# Patient Record
Sex: FEMALE | Race: WHITE
[De-identification: names, ages, dates, MRNs, and addresses within clinical notes are randomized per-mention and may not be internally consistent; named-entity substitution may affect disease eponyms.]

---

## 2017-07-22 ENCOUNTER — HOSPITAL ENCOUNTER (EMERGENCY)
Dept: HOSPITAL 25 - ED | Age: 77
LOS: 1 days | Discharge: HOME | End: 2017-07-23
Payer: MEDICARE

## 2017-07-22 VITALS — DIASTOLIC BLOOD PRESSURE: 86 MMHG | SYSTOLIC BLOOD PRESSURE: 142 MMHG

## 2017-07-22 DIAGNOSIS — R18.8: ICD-10-CM

## 2017-07-22 DIAGNOSIS — J90: Primary | ICD-10-CM

## 2017-07-22 DIAGNOSIS — R10.9: ICD-10-CM

## 2017-07-22 DIAGNOSIS — R50.9: ICD-10-CM

## 2017-07-22 LAB
ALBUMIN SERPL BCG-MCNC: 3.7 G/DL (ref 3.2–5.2)
ALP SERPL-CCNC: 61 U/L (ref 34–104)
ALT SERPL W P-5'-P-CCNC: 9 U/L (ref 7–52)
ANION GAP SERPL CALC-SCNC: 11 MMOL/L (ref 2–11)
AST SERPL-CCNC: 22 U/L (ref 13–39)
BUN SERPL-MCNC: 25 MG/DL (ref 6–24)
BUN/CREAT SERPL: 26.3 (ref 8–20)
CALCIUM SERPL-MCNC: 9.5 MG/DL (ref 8.6–10.3)
CHLORIDE SERPL-SCNC: 101 MMOL/L (ref 101–111)
GLOBULIN SER CALC-MCNC: 2.7 G/DL (ref 2–4)
GLUCOSE SERPL-MCNC: 106 MG/DL (ref 70–100)
HCO3 SERPL-SCNC: 21 MMOL/L (ref 22–32)
HCT VFR BLD AUTO: 38 % (ref 35–47)
HGB BLD-MCNC: 12.3 G/DL (ref 12–16)
MAGNESIUM SERPL-MCNC: 1.7 MG/DL (ref 1.9–2.7)
MCH RBC QN AUTO: 27 PG (ref 27–31)
MCHC RBC AUTO-ENTMCNC: 33 G/DL (ref 31–36)
MCV RBC AUTO: 83 FL (ref 80–97)
POTASSIUM SERPL-SCNC: 4 MMOL/L (ref 3.5–5)
PROT SERPL-MCNC: 6.4 G/DL (ref 6.4–8.9)
RBC # BLD AUTO: 4.51 10^6/UL (ref 4–5.4)
SODIUM SERPL-SCNC: 133 MMOL/L (ref 133–145)
WBC # BLD AUTO: 9.7 10^3/UL (ref 3.5–10.8)

## 2017-07-22 PROCEDURE — 71275 CT ANGIOGRAPHY CHEST: CPT

## 2017-07-22 PROCEDURE — 80053 COMPREHEN METABOLIC PANEL: CPT

## 2017-07-22 PROCEDURE — 93005 ELECTROCARDIOGRAM TRACING: CPT

## 2017-07-22 PROCEDURE — 36415 COLL VENOUS BLD VENIPUNCTURE: CPT

## 2017-07-22 PROCEDURE — 85025 COMPLETE CBC W/AUTO DIFF WBC: CPT

## 2017-07-22 PROCEDURE — 83735 ASSAY OF MAGNESIUM: CPT

## 2017-07-22 PROCEDURE — 83880 ASSAY OF NATRIURETIC PEPTIDE: CPT

## 2017-07-22 PROCEDURE — 71020: CPT

## 2017-07-22 PROCEDURE — 85379 FIBRIN DEGRADATION QUANT: CPT

## 2017-07-22 PROCEDURE — 85610 PROTHROMBIN TIME: CPT

## 2017-07-22 PROCEDURE — 83605 ASSAY OF LACTIC ACID: CPT

## 2017-07-22 PROCEDURE — 99283 EMERGENCY DEPT VISIT LOW MDM: CPT

## 2017-07-22 NOTE — ED
Ortega VASQUEZ Benjamin, scribed for Chandu Hare MD on 07/22/17 at 2118 .





Abdominal Pain/Female





- HPI Summary


HPI Summary: 





78yo female c/o abdominal pain from abdominal distension and acid reflux since 7 /18. Pt had been having abdominal distension for few weeks now and was dxed 

with an 30cm abdominal mass that is spreading to the rest of her abdomen from 

an abdominal CT. She has appointment with Dr. Gerard in [place] but wants to 

go to [facility] in [place]. Pt also reports temp of 100F and 

states that she feels ill. Pt also reports difficulty sitting up due to her 

abdominal distension and pain.





- History of Current Complaint


Chief Complaint: EDAbdPain


Stated Complaint: FEVER 100.1


Time Seen by Provider: 07/22/17 21:07


Hx Obtained From: Patient, Family/Caretaker - 


Onset/Duration: Gradual Onset, Lasting Days, Still Present


Timing: Days


Severity Initially: Moderate


Severity Currently: Moderate


Pain Intensity: 5


Pain Scale Used: 0-10 Numeric


Location: Diffuse


Radiates: No


Character: Other: - distended


Aggravating Factor(s): Nothing


Alleviating Factor(s): Nothing


Associated Signs and Symptoms: Positive: Fever - 100F, Other: - acid reflux


Allergies/Adverse Reactions: 


 Allergies











Allergy/AdvReac Type Severity Reaction Status Date / Time


 


Sulfa Antibiotics AdvReac  See Comment Verified 08/28/16 11:46














PMH/Surg Hx/FS Hx/Imm Hx


Endocrine/Hematology History: 


   Denies: Hx Diabetes


Cardiovascular History: 


   Denies: Hx Hypertension


GI History: Reports: Other GI Disorders - abdominal mass


 History: 


   Denies: Hx Renal Disease





- Surgical History


Surgery Procedure, Year, and Place: appy as "a teenager"


Infectious Disease History: No


Infectious Disease History: 


   Denies: History Other Infectious Disease, Traveled Outside the [place] in Last 30 

Days





- Family History


Known Family History: 


   Negative: Cardiac Disease - denies family hx of, Diabetes





- Social History


Occupation: Employed Full-time


Lives: With Family


Alcohol Use: None


Hx Substance Use: No


Substance Use Type: Reports: None


Smoking Status (MU): Never Smoked Tobacco





Review of Systems


Positive: Fever - 100F


Eyes: Negative


ENT: Negative


Cardiovascular: Negative


Positive: Other


Respiratory: Negative


Positive: Abdominal Pain, Other - acid reflux.  Negative: Vomiting, Diarrhea


Genitourinary: Negative


Musculoskeletal: Negative


Skin: Negative


Neurological: Negative


Psychological: Normal


All Other Systems Reviewed And Are Negative: Yes





Physical Exam


Triage Information Reviewed: Yes


Vital Signs On Initial Exam: 


 Initial Vitals











Temp Pulse Resp BP Pulse Ox


 


 99.5 F   130   18   134/80   94 


 


 07/22/17 20:09  07/22/17 20:09  07/22/17 20:09  07/22/17 20:09  07/22/17 20:09











Vital Signs Reviewed: Yes


Appearance: Positive: Ill-Appearing - chronically ill appearing, Pain Distress 

- mild discomfort


Skin: Positive: Warm


Head/Face: Positive: Normal Head/Face Inspection


Eyes: Positive: JAY


ENT: Positive: Hearing grossly normal


Neck: Positive: Supple


Respiratory/Lung Sounds: Positive: Decreased Breath Sounds - left base


Cardiovascular: Positive: Tachycardia


Abdomen Description: Positive: Soft, Distended - ascites present


Bowel Sounds: Positive: Present


Musculoskeletal: Positive: Strength/ROM Intact


Neurological: Positive: Alert, Oriented to Person Place, Time, Normal Gait


Psychiatric: Positive: Anxious





- Franklin Coma Scale


Coma Scale Total: 15





Diagnostics





- Vital Signs


 Vital Signs











  Temp Pulse Resp BP Pulse Ox


 


 07/22/17 20:44  99.8 F  126  18  140/76  95


 


 07/22/17 20:09  99.5 F  130  18  134/80  94














- Laboratory


Lab Results: 


 Lab Results











  07/22/17 07/22/17 07/22/17 Range/Units





  21:33 21:33 21:33 


 


WBC  9.7    (3.5-10.8)  10^3/ul


 


RBC  4.51    (4.0-5.4)  10^6/ul


 


Hgb  12.3    (12.0-16.0)  g/dl


 


Hct  38    (35-47)  %


 


MCV  83    (80-97)  fL


 


MCH  27    (27-31)  pg


 


MCHC  33    (31-36)  g/dl


 


RDW  13    (10.5-15)  %


 


Plt Count  182    (150-450)  10^3/ul


 


MPV  9    (7.4-10.4)  um3


 


Neut % (Auto)  81.7    (38-83)  %


 


Lymph % (Auto)  4.3 L    (25-47)  %


 


Mono % (Auto)  13.5 H    (1-9)  %


 


Eos % (Auto)  0.2    (0-6)  %


 


Baso % (Auto)  0.3    (0-2)  %


 


Absolute Neuts (auto)  7.9 H    (1.5-7.7)  10^3/ul


 


Absolute Lymphs (auto)  0.4 L    (1.0-4.8)  10^3/ul


 


Absolute Monos (auto)  1.3 H    (0-0.8)  10^3/ul


 


Absolute Eos (auto)  0    (0-0.6)  10^3/ul


 


Absolute Basos (auto)  0    (0-0.2)  10^3/ul


 


Absolute Nucleated RBC  0    10^3/ul


 


Nucleated RBC %  0    


 


INR (Anticoag Therapy)     (0.89-1.11)  


 


D-Dimer, Quantitative     (Less Than 230)  ng/mL


 


Sodium   133   (133-145)  mmol/L


 


Potassium   4.0   (3.5-5.0)  mmol/L


 


Chloride   101   (101-111)  mmol/L


 


Carbon Dioxide   21 L   (22-32)  mmol/L


 


Anion Gap   11   (2-11)  mmol/L


 


BUN   25 H   (6-24)  mg/dL


 


Creatinine   0.95   (0.51-0.95)  mg/dL


 


Est GFR ( Amer)   73.8   (>60)  


 


Est GFR (Non-Af Amer)   57.3   (>60)  


 


BUN/Creatinine Ratio   26.3 H   (8-20)  


 


Glucose   106 H   ()  mg/dL


 


Lactic Acid    0.7  (0.5-2.0)  mmol/L


 


Calcium   9.5   (8.6-10.3)  mg/dL


 


Magnesium   1.7 L   (1.9-2.7)  mg/dL


 


Total Bilirubin   1.30 H   (0.2-1.0)  mg/dL


 


AST   22   (13-39)  U/L


 


ALT   9   (7-52)  U/L


 


Alkaline Phosphatase   61   ()  U/L


 


Total Protein   6.4   (6.4-8.9)  g/dL


 


Albumin   3.7   (3.2-5.2)  g/dL


 


Globulin   2.7   (2-4)  g/dL


 


Albumin/Globulin Ratio   1.4   (1-3)  














  07/22/17 Range/Units





  21:33 


 


WBC   (3.5-10.8)  10^3/ul


 


RBC   (4.0-5.4)  10^6/ul


 


Hgb   (12.0-16.0)  g/dl


 


Hct   (35-47)  %


 


MCV   (80-97)  fL


 


MCH   (27-31)  pg


 


MCHC   (31-36)  g/dl


 


RDW   (10.5-15)  %


 


Plt Count   (150-450)  10^3/ul


 


MPV   (7.4-10.4)  um3


 


Neut % (Auto)   (38-83)  %


 


Lymph % (Auto)   (25-47)  %


 


Mono % (Auto)   (1-9)  %


 


Eos % (Auto)   (0-6)  %


 


Baso % (Auto)   (0-2)  %


 


Absolute Neuts (auto)   (1.5-7.7)  10^3/ul


 


Absolute Lymphs (auto)   (1.0-4.8)  10^3/ul


 


Absolute Monos (auto)   (0-0.8)  10^3/ul


 


Absolute Eos (auto)   (0-0.6)  10^3/ul


 


Absolute Basos (auto)   (0-0.2)  10^3/ul


 


Absolute Nucleated RBC   10^3/ul


 


Nucleated RBC %   


 


INR (Anticoag Therapy)  1.02  (0.89-1.11)  


 


D-Dimer, Quantitative  757 H  (Less Than 230)  ng/mL


 


Sodium   (133-145)  mmol/L


 


Potassium   (3.5-5.0)  mmol/L


 


Chloride   (101-111)  mmol/L


 


Carbon Dioxide   (22-32)  mmol/L


 


Anion Gap   (2-11)  mmol/L


 


BUN   (6-24)  mg/dL


 


Creatinine   (0.51-0.95)  mg/dL


 


Est GFR ( Amer)   (>60)  


 


Est GFR (Non-Af Amer)   (>60)  


 


BUN/Creatinine Ratio   (8-20)  


 


Glucose   ()  mg/dL


 


Lactic Acid   (0.5-2.0)  mmol/L


 


Calcium   (8.6-10.3)  mg/dL


 


Magnesium   (1.9-2.7)  mg/dL


 


Total Bilirubin   (0.2-1.0)  mg/dL


 


AST   (13-39)  U/L


 


ALT   (7-52)  U/L


 


Alkaline Phosphatase   ()  U/L


 


Total Protein   (6.4-8.9)  g/dL


 


Albumin   (3.2-5.2)  g/dL


 


Globulin   (2-4)  g/dL


 


Albumin/Globulin Ratio   (1-3)  











Result Diagrams: 


 07/22/17 21:33





 07/22/17 21:33


Lab Statement: Any lab studies that have been ordered have been reviewed, and 

results considered in the medical decision making process.





- Radiology


  ** CXR


Xray Interpretation: Positive (See Comments) - IMPRESSION:  SMALL LEFT PLEURAL 

EFFUSION.


Radiology Interpretation Completed By: Radiologist





- CT


  ** CTA chest 


CT Interpretation: Positive (See Comments) - nonspecific ascites and left 

pleural fluid collection. No pulmonary embolism.





- EKG


  ** 2236.


Cardiac Rate: Tachycardia - 115bpm


EKG Rhythm: Sinus Tachycardia


ST Segment: Normal


Ectopy: None





Re-Evaluation





- Re-Evaluation


  ** First Eval


Comment: results d/w pt.  pt offered admission, case d/w hospitalist





  ** Second Eval


Comment: pt now requesting transfer to [facility].  Pt has never been there before

, explained to pt that we can treat pt here and then consider making transfer 

arrangements as we cannot transfer from ed as there is no medical justification 

as we can provide sewrvices here.  Pt now states wants to be d/c to go home.  

Pt d/c advised to f/u with pcp, return to ed for any concerns





Abdominal Pain Fem Course/Dx





- Diagnoses


Provider Diagnoses: 


 Pleural effusion, Ascites








Discharge





- Discharge Plan


Condition: Stable


Disposition: HOME


Patient Education Materials:  Pleural Effusion (ED), Ascites (ED)


Referrals: 


Prachi Hansen MD [Primary Care Provider] - 





The documentation as recorded by the Ortega dong Benjamin accurately reflects 

the service I personally performed and the decisions made by me, Chandu Hare MD.

## 2017-07-22 NOTE — RAD
INDICATION:  Shortness of breath.



COMPARISON:  Comparison is made with a prior CT of the abdomen and pelvis from July 20, 2017.



TECHNIQUE: Dual-energy PA  and lateral views of the chest were obtained.



FINDINGS:   The heart is within normal limits in size. Mediastinal and hilar contours

appear within normal limits.



There is a small left pleural effusion which is similar to that seen on the prior CT

study. The lungs are otherwise clear. 



IMPRESSION:  SMALL LEFT PLEURAL EFFUSION.

## 2017-07-23 NOTE — RAD
INDICATION:  Shortness of breath, elevated d-dimer.



COMPARISON: Comparison is made with a prior CT of the abdomen and pelvis from July 20, 2017.



TECHNIQUE: A CT angiogram of the chest was performed with intravenous following

intravenous injection of 74 ml of Visipaque 320 nonionic contrast. Contiguous axial

sections were obtained from the lung apices through the lung bases. Images were

reconstructed in the coronal and sagittal planes.



FINDINGS: There is relatively homogeneous opacification of the pulmonary arteries. No

intraluminal filling defect or pulmonary embolism is seen.



The heart is within normal limits in size. There is a small pericardial effusion present. 

The thoracic aorta is normal in caliber and demonstrates homogeneous contrast

opacification.



There are multiple enlarged mediastinal lymph nodes in the pretracheal subcarinal and

aorticopulmonary window regions. These measure up to 2.2 cm in transverse dimension. There

are also enlarged supraclavicular lymph nodes present on the left side measuring up to 2.0

cm in transverse dimension. There is a hypodense thyroid nodule measuring 1.6 x 1.2 cm in

size.



There is a small a moderate size left pleural effusion and a left lower lobe infiltrate

most consistent with atelectasis. The lungs are otherwise clear.



There is a small hiatal hernia. There is thickening of the wall of the distal esophagus.



Images of the abdomen demonstrate a large amount of ascites. Again note is made of a

retroperitoneal mass on the left side encasing the left kidney as described on the prior

CT abdomen study.



No significant focal osseous abnormality is seen.



IMPRESSION:  

1. NO EVIDENCE FOR PULMONARY EMBOLISM.

2. SMALL PERICARDIAL EFFUSION.

3. SMALL TO MODERATE SIZE LEFT PLEURAL EFFUSION AND LEFT LOWER LOBE ATELECTASIS.

4. ENLARGED MEDIASTINAL AND SUPRAHILAR CLAVICULAR LYMPH NODES.

5. LARGE RETROPERITONEAL MASS SURROUNDING THE LEFT KIDNEY AS PREVIOUSLY NOTED.

6. ASCITES                                                                                

                                                                                          

                     7. SMALL HIATAL HERNIA AND THICKENING OF THE WALL OF THE DISTAL

ESOPHAGUS.

8. SMALL THYROID NODULE.

## 2017-08-09 ENCOUNTER — HOSPITAL ENCOUNTER (INPATIENT)
Dept: HOSPITAL 25 - ED | Age: 77
LOS: 2 days | Discharge: HOME | DRG: 641 | End: 2017-08-11
Attending: INTERNAL MEDICINE | Admitting: INTERNAL MEDICINE
Payer: MEDICARE

## 2017-08-09 ENCOUNTER — HOSPITAL ENCOUNTER (EMERGENCY)
Dept: HOSPITAL 25 - UCEAST | Age: 77
Discharge: ANOTHER HOSPITAL | End: 2017-08-09
Payer: MEDICARE

## 2017-08-09 VITALS — SYSTOLIC BLOOD PRESSURE: 138 MMHG | DIASTOLIC BLOOD PRESSURE: 76 MMHG

## 2017-08-09 DIAGNOSIS — J44.9: ICD-10-CM

## 2017-08-09 DIAGNOSIS — G62.9: ICD-10-CM

## 2017-08-09 DIAGNOSIS — C85.90: ICD-10-CM

## 2017-08-09 DIAGNOSIS — E86.1: Primary | ICD-10-CM

## 2017-08-09 DIAGNOSIS — R53.1: Primary | ICD-10-CM

## 2017-08-09 DIAGNOSIS — R73.03: ICD-10-CM

## 2017-08-09 DIAGNOSIS — H53.9: ICD-10-CM

## 2017-08-09 DIAGNOSIS — N13.39: ICD-10-CM

## 2017-08-09 DIAGNOSIS — Z88.2: ICD-10-CM

## 2017-08-09 DIAGNOSIS — I10: ICD-10-CM

## 2017-08-09 DIAGNOSIS — E78.5: ICD-10-CM

## 2017-08-09 LAB
ALBUMIN SERPL BCG-MCNC: 3.1 G/DL (ref 3.2–5.2)
ALP SERPL-CCNC: 62 U/L (ref 34–104)
ALT SERPL W P-5'-P-CCNC: 14 U/L (ref 7–52)
ANION GAP SERPL CALC-SCNC: 8 MMOL/L (ref 2–11)
AST SERPL-CCNC: 22 U/L (ref 13–39)
BUN SERPL-MCNC: 21 MG/DL (ref 6–24)
BUN/CREAT SERPL: 20.6 (ref 8–20)
CALCIUM SERPL-MCNC: 9.1 MG/DL (ref 8.6–10.3)
CHLORIDE SERPL-SCNC: 103 MMOL/L (ref 101–111)
GLOBULIN SER CALC-MCNC: 2.8 G/DL (ref 2–4)
GLUCOSE SERPL-MCNC: 89 MG/DL (ref 70–100)
HCO3 SERPL-SCNC: 26 MMOL/L (ref 22–32)
HCT VFR BLD AUTO: 37 % (ref 35–47)
HGB BLD-MCNC: 12 G/DL (ref 12–16)
LIPASE SERPL-CCNC: 13 U/L (ref 11–82)
MAGNESIUM SERPL-MCNC: 1.9 MG/DL (ref 1.9–2.7)
MCH RBC QN AUTO: 26 PG (ref 27–31)
MCHC RBC AUTO-ENTMCNC: 32 G/DL (ref 31–36)
MCV RBC AUTO: 81 FL (ref 80–97)
POTASSIUM SERPL-SCNC: 4 MMOL/L (ref 3.5–5)
PROT SERPL-MCNC: 5.9 G/DL (ref 6.4–8.9)
RBC # BLD AUTO: 4.55 10^6/UL (ref 4–5.4)
SODIUM SERPL-SCNC: 137 MMOL/L (ref 133–145)
TROPONIN I SERPL-MCNC: 0.01 NG/ML (ref ?–0.04)
TSH SERPL-ACNC: 1.88 MCIU/ML (ref 0.34–5.6)
WBC # BLD AUTO: 6.7 10^3/UL (ref 3.5–10.8)

## 2017-08-09 PROCEDURE — 80053 COMPREHEN METABOLIC PANEL: CPT

## 2017-08-09 PROCEDURE — 87070 CULTURE OTHR SPECIMN AEROBIC: CPT

## 2017-08-09 PROCEDURE — 70450 CT HEAD/BRAIN W/O DYE: CPT

## 2017-08-09 PROCEDURE — 84443 ASSAY THYROID STIM HORMONE: CPT

## 2017-08-09 PROCEDURE — 99213 OFFICE O/P EST LOW 20 MIN: CPT

## 2017-08-09 PROCEDURE — 83605 ASSAY OF LACTIC ACID: CPT

## 2017-08-09 PROCEDURE — 89051 BODY FLUID CELL COUNT: CPT

## 2017-08-09 PROCEDURE — G0463 HOSPITAL OUTPT CLINIC VISIT: HCPCS

## 2017-08-09 PROCEDURE — 82945 GLUCOSE OTHER FLUID: CPT

## 2017-08-09 PROCEDURE — 86140 C-REACTIVE PROTEIN: CPT

## 2017-08-09 PROCEDURE — 70496 CT ANGIOGRAPHY HEAD: CPT

## 2017-08-09 PROCEDURE — 85025 COMPLETE CBC W/AUTO DIFF WBC: CPT

## 2017-08-09 PROCEDURE — 95886 MUSC TEST DONE W/N TEST COMP: CPT

## 2017-08-09 PROCEDURE — 36415 COLL VENOUS BLD VENIPUNCTURE: CPT

## 2017-08-09 PROCEDURE — 84157 ASSAY OF PROTEIN OTHER: CPT

## 2017-08-09 PROCEDURE — 88112 CYTOPATH CELL ENHANCE TECH: CPT

## 2017-08-09 PROCEDURE — 81003 URINALYSIS AUTO W/O SCOPE: CPT

## 2017-08-09 PROCEDURE — 93005 ELECTROCARDIOGRAM TRACING: CPT

## 2017-08-09 PROCEDURE — 71010: CPT

## 2017-08-09 PROCEDURE — 70551 MRI BRAIN STEM W/O DYE: CPT

## 2017-08-09 PROCEDURE — 95909 NRV CNDJ TST 5-6 STUDIES: CPT

## 2017-08-09 PROCEDURE — 83735 ASSAY OF MAGNESIUM: CPT

## 2017-08-09 PROCEDURE — 85610 PROTHROMBIN TIME: CPT

## 2017-08-09 PROCEDURE — 82746 ASSAY OF FOLIC ACID SERUM: CPT

## 2017-08-09 PROCEDURE — 70498 CT ANGIOGRAPHY NECK: CPT

## 2017-08-09 PROCEDURE — 85730 THROMBOPLASTIN TIME PARTIAL: CPT

## 2017-08-09 PROCEDURE — 84484 ASSAY OF TROPONIN QUANT: CPT

## 2017-08-09 PROCEDURE — 82607 VITAMIN B-12: CPT

## 2017-08-09 PROCEDURE — 83690 ASSAY OF LIPASE: CPT

## 2017-08-09 PROCEDURE — 87205 SMEAR GRAM STAIN: CPT

## 2017-08-09 RX ADMIN — SODIUM CHLORIDE SCH MLS/HR: 900 IRRIGANT IRRIGATION at 12:37

## 2017-08-09 RX ADMIN — SODIUM CHLORIDE SCH MLS/HR: 900 IRRIGANT IRRIGATION at 22:33

## 2017-08-09 RX ADMIN — ENOXAPARIN SODIUM SCH MG: 40 INJECTION SUBCUTANEOUS at 18:01

## 2017-08-09 NOTE — RAD
HISTORY: Sudden onset blurred vision, dizziness and weakness



COMPARISONS: CT of the brain dated August 09, 2017



TECHNIQUE: The following sequences were obtained of the head: Sagittal T1-weighted images,

axial T2-weighted images, axial FLAIR images, axial susceptibility weighted images, axial

T1-weighted images. Additionally, axial diffusion-weighted images were obtained with

calculated apparent diffusion coefficients..    



FINDINGS: 



HEMORRHAGE/INFARCT: There is no hemorrhage or acute infarct.

MASSES/SHIFT: There is no mass or shift.

EXTRA-AXIAL SPACES/MENINGES: There are no extra-axial fluid collections.

SULCI AND VENTRICLES: The sulci and ventricles are normal in size and position for the

patient's stated age.



CEREBRUM: There are no focal parenchymal abnormalities.

BRAINSTEM: There are no focal parenchymal abnormalities.

CEREBELLUM: There are no focal parenchymal abnormalities. The cerebellar tonsils are

normal in size and position.



SELLA: The sella is normal.

PINEAL: The pineal region is clear.

CP ANGLE/TEMPORAL BONES: The labyrinthine structures are grossly normal.



VESSELS: Normal flow-voids are noted within the visualized vertebral vasculature.

DIFFUSION ABNORMALITIES: There are no diffusion abnormalities. 



PARANASAL SINUSES/MASTOIDS: The paranasal sinuses are clear.

ORBITS: The orbits are unremarkable.

BONES AND SOFT TISSUE: No bone or soft tissue abnormalities are noted.





IMPRESSION: 

NORMAL MRI OF THE BRAIN.

## 2017-08-09 NOTE — RAD
INDICATION: Sudden onset visual changes and dizziness.



COMPARISON: Noncontrast head CT of the same date. July 22, 2017 chest CT.



TECHNIQUE: Multidetector CT images were obtained from the aortic arch to the vertex of the

head with 80 mL Visipaque 320 IV contrast. Arterial phase of enhancement. Multiplanar

reformation including maximum intensity projection. 3-D arterial volume rendering.

Stenosis estimations based on denominator of distal arterial diameter.



NECK ANGIOGRAM REPORT: Prevascular lymphadenopathy measuring up to 1.7 cm short axis

without significant change compared with the July 22, 2017 CT. 1.4 cm RIGHT paratracheal

lymph node without change. Moderately large dependent RIGHT pleural effusion with

proportional atelectasis without gross change. 1.5 cm RIGHT thyroid nodule without change.



Variant common origin of the RIGHT brachiocephalic artery and LEFT common carotid artery.

Negative for ostial stenosis at the aortic arch branch vessels. Negative for

atherosclerotic plaque at the common or internal carotid arteries. Patent codominant

vertebral arteries with both contributing to the basilar artery. Negative for arterial

dissection.



NECK ANGIOGRAM IMPRESSION: 

1. Negative for carotid or vertebral artery stenosis or occlusion.

2. Mediastinal lymphadenopathy, LEFT pleural effusion with associated atelectasis, and

RIGHT thyroid lobe nodule without change compared with the July 22, 2017 chest CT.



HEAD ANGIOGRAM REPORT: Minimal calcific plaque at the RIGHT carotid siphon. No hemodynamic

significant stenosis, occlusion, or conspicuous dissection of the intracranial internal

carotid arteries or first or second segments of the middle cerebral arteries. Patent

bilateral A1 segments of the anterior cerebral arteries. Patent anterior communicating

artery. Patent unremarkable LEFT A2 segment. Diminutive RIGHT A2 segment likely

representing a normal variant. 



Unremarkable patent basilar artery and cerebellar artery origins. Patent posterior

cerebral arteries are supplied primarily by the posterior circulation with normal variant

hypoplastic posterior communicating arteries.



No intracranial aneurysm or vascular malformation evident.



Multilevel cervical spine degenerative spondylosis and facet joint osteoarthritis. No

suspicious focal osseous lesions evident within the field-of-view.



HEAD ANGIOGRAM IMPRESSION: Negative for central intracranial large vessel arterial

stenosis or occlusion.



CPT II: CPT II Codes: 3100F

## 2017-08-09 NOTE — HP
ADMISSION HISTORY AND PHYSICAL:

 

DATE OF ADMISSION:  08/09/17

 

PRIMARY CARE PHYSICIAN:  Dr. Hansen.

 

ONCOLOGIST:  Dr. Paramjit Aguilar, [facility].

 

HEALTHCARE PROXY:  Her daughter, Cordelia, and her son, Richmond.

 

CODE STATUS:  Full.

 

SOURCE OF INFORMATION:  History was obtained from review of medical records 
from [facility], which are good and discussion with the patient, which is poor.

 

CHIEF COMPLAINT:  Blurry vision/near syncope.

 

HISTORY OF PRESENT ILLNESS:  This is a 77-year-old who was worked up in July 
for abdominal bloating and new ascites, found with a large retroperitoneal mass 
on CAT scan.  The mass was suspected to be ovarian versus primary peritoneal 
carcinomatosis based on a CA-125 of 1893.  Paracentesis on 07/24/17 of several 
liters failed to identify primary etiology, so she was scheduled for a biopsy 
on 07/31/17, with the results indicating follicular center cell lymphoma.  She 
is currently recommended for excisional biopsy to rule out high grade B-cell 
lymphoma and is also scheduled for a PET/CT.  Since the time of her biopsy and 
diagnosis, she has felt increasingly weak and fatigued.  Her daughter indicates 
that she has been eating and drinking less.  The patient admits to eating and 
drinking less out of fear for developing increased ascites.  Her ascites has 
increased since the time of her last paracentesis over 2 weeks prior.  Today, 
she was in her usual state of health, generally fatigued; however, had two 
stressful phone calls (both work related).  Around 10 a.m. she was looking at a 
screen and her vision did "funny stuff" and it felt "difficult to focus."  She 
felt weak and tired, and when she tried to get up she felt unsteady.  She is 
unable to tell me whether she was lightheaded, whether her unsteadiness was 
more disequilibrium or vertiginous, although she does note she has had symptoms 
of vertigo in the past.  She proceeded to [facility] who directed her to 
[facility] [place] [facility].  Currently when seen by this author, she denies any 
lightheadedness or vertiginous symptoms, but does feel weak and afraid of 
falling.  She does also endorse cough for the last several days.

 

PAST MEDICAL HISTORY:  Large left retroperitoneal mass thought to be a lymphoma 
at this time, history of palpitations, prediabetes, left hydronephrosis as a 
result of retroperitoneal mass encasing the vasculature in ureter and left 
kidney, hyperlipidemia, COPD, and hypertension.

 

MEDICATIONS:  Currently on no medications or over the counters.

 

ALLERGIES:  SULFA ANTIBIOTICS.

 

FAMILY HISTORY:  She is adopted.

 

SOCIAL HISTORY:  No tobacco.  Has 4 children.  She is a microbiologist.  She 
drinks alcohol socially.

 

REVIEW OF SYSTEMS:  Per HPI including cough, feeling of weakness, fatigue, and 
symptoms from this morning.  Otherwise, all other systems negative.

 

                               PHYSICAL EXAMINATION

 

GENERAL:  A thin woman lying 40 degrees in bed, interactive, pleasant, in no 
apparent distress.

 

VITAL SIGNS:  When seen by this author, 119/72, heart rate 104, respiratory 
rate 12, 94% on room air, T-max 98.5.

 

HEENT:  Oropharynx is clear.  She has moist mucous membranes.  Sclerae are 
anicteric.  She has nonelevated JVD.  She has no cervical or supraclavicular 
lymphadenopathy.

 

LUNGS:  Clear to auscultation.

 

HEART:  She has tachycardic heart rate.  No murmurs, rubs, or gallops.

 

ABDOMEN:  Distended, but soft, nontender.  Positive fluid wave.

 

EXTREMITIES:  Warm, well perfused.  No clubbing, cyanosis, or edema.  Less than 
2 seconds cap refill.  She has temporal waisting.

 

NEUROLOGIC:  She is alert and oriented x3.  She has 5/5 strength throughout.  
She has intact finger-nose-finger per discussion with Dr. Sanford, who did walk 
the patient.  She does have positive Romberg and some evidence of peripheral 
neuropathy.

 

 PERTINENT LABORATORY DATA:  Reviewed.  White blood cell count 6.7, hemoglobin 
12, platelets 219,000.  BUN 21, creatinine 1.0, glucose 89, lactic acid 0.8.  
CRP is

20.  TSH 1.8.  Urine is positive for specific gravity of 1.048 and trace 
ketones.

 

Data reviewed.

 

Head CTA, impression:  Head angiogram negative for central intracranial large 
vessel arterial stenosis or occlusion.  Neck:  Negative for carotid or 
vertebral artery stenosis or occlusion.  Mediastinal lymphadenopathy.  Left 
pleural effusion with associated atelectasis and a right thyroid lobe nodule 
without change compared to 07/22/17 chest CT.

 

Chest x-ray, impression:  Bibasilar infiltrate and left-sided effusion without 
radiographic change.

 

EKG:  Normal sinus rhythm, normal limit axis and intervals, no ST or T-wave 
changes.

 

ASSESSMENT AND PLAN:  This is a 77-year-old female with recent diagnosis of 
suspected lymphoma found after evaluation of new abdominal ascites, presenting 
with lightheadedness, fatigue, and episode of unsteadiness/presyncope after 
standing.

 

Unsteadiness/presyncope.  I suspect there is a component of volume depletion in 
the setting of increasing abdominal ascites, and fluid, water, and salt 
restriction. The patient is currently receiving normal saline.  We will 
continue for 2 additional liters.  There certainly could be a component of 
meningeal involvement, although leptomeningitis is likely ruled out in the 
setting of MRI, although not with contrast.  Agree with Dr. Sanford's 
recommendation for an LP to also rule out lymphocytic meningitis; however, the 
patient is currently declining the procedure. We will reevaluate tomorrow, try 
to encourage should she not improve.  No other evidence of infection at this 
point, doubts for cerebrovascular accident.

 

Abdominal mass, suspected to be lymphoma.  I have already contacted [facility] to try to obtain records; however, at this point nobody is answering the 
telephones. I have to attempt again tomorrow.

 

Abdominal ascites.  No indication to tap at this point.  The patient does have 
a mild cough, some mild atelectasis and the left consolidation was unchanged.  
In the absence of leukocytosis, fevers, chills, or sweats even in the setting 
of lymphoma, I favor holding on antibiotics at this point.  Any change, and I 
would treat for community-acquired pneumonia.

 

DVT prophylaxis, Lovenox in the setting of lymphoma.

 

Code status is full.

 

[id number]/CPS #: [id number]

MTDD

## 2017-08-09 NOTE — UC
General HPI





- HPI Summary


HPI Summary: 





SUDDEN ONSET OF VISUAL DISTURBANCE WHILE LOOKING AT THE COMPUTER SCREEN THIS 

MORNING. LETTERS SEEMS TO BE "DISSOLVING INTO WATER". ALSO FEELS OVERALL WEAK. 

NO NAUSEA. NO CONFUSION OR FACIAL DROOPING. IS CURRENTLY BEING WORKED UP FOR A 

LARGE LEFT SIDED RETROPERITONEAL MASS.





- History of Current Complaint


Chief Complaint: UCGeneralIllness


Stated Complaint: STROKE SYMPTOMS


Time Seen by Provider: 08/09/17 10:40


Hx Obtained From: Patient, Family/Caretaker - SON


Onset/Duration: Sudden Onset, Lasting Minutes, Still Present


Timing: Constant


Onset Severity: Moderate


Current Severity: Moderate


Pain Intensity: 0


Associated Signs & Symptoms: Negative: Fever





- Allergy/Home Medications


Allergies/Adverse Reactions: 


 Allergies











Allergy/AdvReac Type Severity Reaction Status Date / Time


 


Sulfa Antibiotics AdvReac  See Comment Verified 08/28/16 11:46














PMH/Surg Hx/FS Hx/Imm Hx


Other Cancer History: LARGE LEFT RETROPERITONEAL MASS





- Surgical History


Surgical History: Yes


Surgery Procedure, Year, and Place: appy as "a teenager" partial mass removal/

drained? at [facility]





- Family History


Known Family History: 


   Negative: Cardiac Disease - denies family hx of, Diabetes





- Social History


Alcohol Use: None


Substance Use Type: None


Smoking Status (MU): Never Smoked Tobacco





Review of Systems


Constitutional: Negative


Skin: Negative


Respiratory: Negative


Cardiovascular: Negative


Gastrointestinal: Abdominal Pain


All Other Systems Reviewed And Are Negative: Yes





Physical Exam


Triage Information Reviewed: Yes


Appearance: Well-Appearing, No Pain Distress, Well-Nourished


Vital Signs: 


 Initial Vital Signs











Temp  98.9 F   08/09/17 10:34


 


Pulse  102   08/09/17 10:34


 


Resp  18   08/09/17 10:34


 


BP  138/76   08/09/17 10:34


 


Pulse Ox  96   08/09/17 10:34











Vital Signs Reviewed: Yes


Eyes: Positive: Conjunctiva Clear


ENT: Positive: Hearing grossly normal


Neck: Positive: Supple


Respiratory: Positive: No respiratory distress, No accessory muscle use


Cardiovascular: Positive: Tachycardia


Abdomen Description: Positive: Soft, Distended


Musculoskeletal: Positive: No Edema


Neurological: Positive: Alert, Other: - CN II-XII GROSSLY INTACT BILATERALLY. 

NEG PRONATOR DRIFT. FINGER TO NOSE INTACT BILATERALLY. HEEL TO SHIN INTACT 

BILATERALLY. RAPID ALTERNATING MVMTS INTACT. 5/5 STRENGTH


Psychological: Positive: Age Appropriate Behavior


Skin: Negative: rashes





Course/Dx





- Differential Dx - Multi-Symptom


Provider Diagnoses: SUDDEN ONSET GENERALIZED WEAKNESS





- Physician Notifications


Discussed Patient Care With: Larissa Dong - [place] [facility]


Time Discussed With Above Provider: 10:48


Instructed by Provider To: MD Will See In ED





Discharge





- Discharge Plan


Condition: Stable


Disposition: TRANS HIGHER LVL OF CARE FAC


Referrals: 


Prachi Hansen MD [Primary Care Provider] -

## 2017-08-09 NOTE — RAD
Indication: Vision changes. Dizziness. Sudden onset of symptoms.



Comparison: No relevant prior exams available on the [facility] PACS for comparison.



Technique: Noncontrast CT vertex of skull through foramen magnum.



Report: Mild prominence of the cerebral sulci and cerebellar fissures reflecting atrophy.

Unremarkable ventricles and basal cisterns. Negative for gray matter white matter

obscuration, intra or extra-axial hemorrhage, or mass effect.



Unremarkable orbital contents.



Indolent thickening of the inner table of the frontal bone. No suspicious calvarial or

skull base lesions. Mild retained secretions at the LEFT sphenoid sinus. Negative for

paranasal sinus fluid levels. Clear mastoid air spaces. Unremarkable scalp.



IMPRESSION: 

1. No evidence for intracranial hemorrhage or acute intracranial process.

2. Mild involutional change.

## 2017-08-09 NOTE — ED
Chance VASQUEZ Angela, scribed for Justin Craig MD on 08/09/17 at 1203 .





Neurological HPI





- HPI Summary


HPI Summary: 


77 y.o female BIBA from [facility] with PMHx of CA presents to the ED with sudden 

onset of decreased vision today at 10:15 AM. Pt reports she was working on her 

computer when the words became difficult to read which she describes as "words 

coming in and out" and "funny vision." Pt denies any blind spots or difficulty 

in speech, but endorses weakness and dizziness, characterized as room spinning. 

She notes that this weakness and dizziness has been different from other 

previous ones; sitting up right exacerbates her dizziness. Pt denies rhinorrhea

, cough, SOB, CP. She currently feels fine and not dizzy. Pt is not on any 

medications.





She reports seeing an oncologist currently at [place] [facility] where she had a tissue 

biopsy that resulted malignant with a possibility of being lymphoma. Pt notes 

that she had a high D-dimer when she was here in the ED on 7/22/17 that was a 

concern for PE, she was diagnosed with pleural effusion and ascites.





- History of Current Complaint


Chief Complaint: EDNeurologicalDeficit


Stated Complaint: POSS STROKE


Time Seen by Provider: 08/09/17 11:27


Hx Obtained From: Patient


Onset/Duration: Sudden Onset


Timing: Sudden Onset


Pain Intensity: 5


Character: Room Spinning, Weak, Dizzy, Visual Changes


Associated Signs and Symptoms: Positive: Visual Changes, Weakness, Dizziness.  

Negative: Loss of Consciousness, Chest Pain, Shortness of Breath





- Allergy/Home Medications


Allergies/Adverse Reactions: 


 Allergies











Allergy/AdvReac Type Severity Reaction Status Date / Time


 


Sulfa Antibiotics AdvReac  See Comment Verified 08/09/17 11:37











Home Medications: 


 Home Medications





NK [No Home Medications Reported]  08/09/17 [History Confirmed 08/09/17]











PMH/Surg Hx/FS Hx/Imm Hx


Endocrine/Hematology History: 


   Denies: Hx Diabetes


Cardiovascular History: 


   Denies: Hx Hypertension


GI History: Reports: Other GI Disorders - abdominal mass


 History: 


   Denies: Hx Renal Disease





- Cancer History


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





- Surgical History


Surgery Procedure, Year, and Place: appy as "a teenager"


Infectious Disease History: No


Infectious Disease History: 


   Denies: Hx Clostridium Difficile, Hx Hepatitis, Hx Human Immunodeficiency 

Virus (HIV), Hx of Known/Suspected MRSA, Hx Shingles, Hx Tuberculosis, Hx Known/

Suspected VRE, Hx Known/Suspected VRSA, History Other Infectious Disease, 

Traveled Outside the [place] in Last 30 Days





- Family History


Known Family History: 


   Negative: Cardiac Disease - denies family hx of, Diabetes





- Social History


Alcohol Use: None


Hx Substance Use: No


Substance Use Type: Reports: None


Smoking Status (MU): Never Smoked Tobacco





Review of Systems


Positive: Other - vision change


Negative: Chest Pain


Negative: Shortness Of Breath


Neurological: Other - Dizziness


Positive: Weakness.  Negative: Slurred Speech


All Other Systems Reviewed And Are Negative: Yes





Physical Exam





- Summary


Physical Exam Summary: 





General: well-appearing, no pain distress


Skin: warm, color reflects adequate perfusion, dry


Head: normal


Eyes: EOMI, JAY


ENT: normal


Neck: supple, nontender


Respiratory: CTA, breath sounds present


Cardiovascular: RRR


Abdomen: soft, nontender, distended


Bowel: present


Musculoskeletal: normal, strength/ROM intact


Neurological: normal, sensory/motor intact, A&O x3


Psychological: affect/mood appropriate


GCS: 15





Triage Information Reviewed: Yes


Vital Signs On Initial Exam: 


 Initial Vitals











Temp Pulse Resp BP Pulse Ox


 


 98.5 F   100   18   115/80   94 


 


 08/09/17 11:25  08/09/17 11:25  08/09/17 11:25  08/09/17 11:25  08/09/17 11:25











Vital Signs Reviewed: Yes





- Cosme Coma Scale


Coma Scale Total: 15





Diagnostics





- Vital Signs


 Vital Signs











  Temp Pulse Resp BP Pulse Ox


 


 08/09/17 11:25  98.5 F  100  18  115/80  94














- Laboratory


Lab Results: 


 Lab Results











  08/09/17 08/09/17 08/09/17 Range/Units





  12:30 12:30 12:30 


 


WBC  6.7    (3.5-10.8)  10^3/ul


 


RBC  4.55    (4.0-5.4)  10^6/ul


 


Hgb  12.0    (12.0-16.0)  g/dl


 


Hct  37    (35-47)  %


 


MCV  81    (80-97)  fL


 


MCH  26 L    (27-31)  pg


 


MCHC  32    (31-36)  g/dl


 


RDW  14    (10.5-15)  %


 


Plt Count  291    (150-450)  10^3/ul


 


MPV  7 L    (7.4-10.4)  um3


 


Neut % (Auto)  84.1 H    (38-83)  %


 


Lymph % (Auto)  5.0 L    (25-47)  %


 


Mono % (Auto)  10.0 H    (1-9)  %


 


Eos % (Auto)  0.5    (0-6)  %


 


Baso % (Auto)  0.4    (0-2)  %


 


Absolute Neuts (auto)  5.6    (1.5-7.7)  10^3/ul


 


Absolute Lymphs (auto)  0.3 L    (1.0-4.8)  10^3/ul


 


Absolute Monos (auto)  0.7    (0-0.8)  10^3/ul


 


Absolute Eos (auto)  0    (0-0.6)  10^3/ul


 


Absolute Basos (auto)  0    (0-0.2)  10^3/ul


 


Absolute Nucleated RBC  0    10^3/ul


 


Nucleated RBC %  0.1    


 


INR (Anticoag Therapy)   0.95   (0.89-1.11)  


 


APTT   31.7   (26.0-36.3)  seconds


 


Sodium    137  (133-145)  mmol/L


 


Potassium    4.0  (3.5-5.0)  mmol/L


 


Chloride    103  (101-111)  mmol/L


 


Carbon Dioxide    26  (22-32)  mmol/L


 


Anion Gap    8  (2-11)  mmol/L


 


BUN    21  (6-24)  mg/dL


 


Creatinine    1.02 H  (0.51-0.95)  mg/dL


 


Est GFR ( Amer)    67.9  (>60)  


 


Est GFR (Non-Af Amer)    52.8  (>60)  


 


BUN/Creatinine Ratio    20.6 H  (8-20)  


 


Glucose    89  ()  mg/dL


 


Lactic Acid     (0.5-2.0)  mmol/L


 


Calcium    9.1  (8.6-10.3)  mg/dL


 


Magnesium    1.9  (1.9-2.7)  mg/dL


 


Total Bilirubin    0.50  (0.2-1.0)  mg/dL


 


AST    22  (13-39)  U/L


 


ALT    14  (7-52)  U/L


 


Alkaline Phosphatase    62  ()  U/L


 


Troponin I    0.01  (<0.04)  ng/mL


 


C-Reactive Protein    20.35 H  (< 5.00)  mg/L


 


Total Protein    5.9 L  (6.4-8.9)  g/dL


 


Albumin    3.1 L  (3.2-5.2)  g/dL


 


Globulin    2.8  (2-4)  g/dL


 


Albumin/Globulin Ratio    1.1  (1-3)  


 


Lipase    13  (11.0-82.0)  U/L


 


TSH    1.88  (0.34-5.60)  mcIU/mL


 


Urine Color     


 


Urine Appearance     


 


Urine pH     (5-9)  


 


Ur Specific Gravity     (1.010-1.030)  


 


Urine Protein     (Negative)  


 


Urine Ketones     (Negative)  


 


Urine Blood     (Negative)  


 


Urine Nitrate     (Negative)  


 


Urine Bilirubin     (Negative)  


 


Urine Urobilinogen     (Negative)  


 


Ur Leukocyte Esterase     (Negative)  


 


Urine Glucose     (Negative)  














  08/09/17 08/09/17 Range/Units





  12:30 15:10 


 


WBC    (3.5-10.8)  10^3/ul


 


RBC    (4.0-5.4)  10^6/ul


 


Hgb    (12.0-16.0)  g/dl


 


Hct    (35-47)  %


 


MCV    (80-97)  fL


 


MCH    (27-31)  pg


 


MCHC    (31-36)  g/dl


 


RDW    (10.5-15)  %


 


Plt Count    (150-450)  10^3/ul


 


MPV    (7.4-10.4)  um3


 


Neut % (Auto)    (38-83)  %


 


Lymph % (Auto)    (25-47)  %


 


Mono % (Auto)    (1-9)  %


 


Eos % (Auto)    (0-6)  %


 


Baso % (Auto)    (0-2)  %


 


Absolute Neuts (auto)    (1.5-7.7)  10^3/ul


 


Absolute Lymphs (auto)    (1.0-4.8)  10^3/ul


 


Absolute Monos (auto)    (0-0.8)  10^3/ul


 


Absolute Eos (auto)    (0-0.6)  10^3/ul


 


Absolute Basos (auto)    (0-0.2)  10^3/ul


 


Absolute Nucleated RBC    10^3/ul


 


Nucleated RBC %    


 


INR (Anticoag Therapy)    (0.89-1.11)  


 


APTT    (26.0-36.3)  seconds


 


Sodium    (133-145)  mmol/L


 


Potassium    (3.5-5.0)  mmol/L


 


Chloride    (101-111)  mmol/L


 


Carbon Dioxide    (22-32)  mmol/L


 


Anion Gap    (2-11)  mmol/L


 


BUN    (6-24)  mg/dL


 


Creatinine    (0.51-0.95)  mg/dL


 


Est GFR ( Amer)    (>60)  


 


Est GFR (Non-Af Amer)    (>60)  


 


BUN/Creatinine Ratio    (8-20)  


 


Glucose    ()  mg/dL


 


Lactic Acid  0.8   (0.5-2.0)  mmol/L


 


Calcium    (8.6-10.3)  mg/dL


 


Magnesium    (1.9-2.7)  mg/dL


 


Total Bilirubin    (0.2-1.0)  mg/dL


 


AST    (13-39)  U/L


 


ALT    (7-52)  U/L


 


Alkaline Phosphatase    ()  U/L


 


Troponin I    (<0.04)  ng/mL


 


C-Reactive Protein    (< 5.00)  mg/L


 


Total Protein    (6.4-8.9)  g/dL


 


Albumin    (3.2-5.2)  g/dL


 


Globulin    (2-4)  g/dL


 


Albumin/Globulin Ratio    (1-3)  


 


Lipase    (11.0-82.0)  U/L


 


TSH    (0.34-5.60)  mcIU/mL


 


Urine Color   Yellow  


 


Urine Appearance   Clear  


 


Urine pH   5.0  (5-9)  


 


Ur Specific Gravity   1.048 H  (1.010-1.030)  


 


Urine Protein   Negative  (Negative)  


 


Urine Ketones   Trace H  (Negative)  


 


Urine Blood   Negative  (Negative)  


 


Urine Nitrate   Negative  (Negative)  


 


Urine Bilirubin   Negative  (Negative)  


 


Urine Urobilinogen   Negative  (Negative)  


 


Ur Leukocyte Esterase   Negative  (Negative)  


 


Urine Glucose   Negative  (Negative)  











Result Diagrams: 


 08/09/17 12:30





 08/09/17 12:30


Lab Statement: Any lab studies that have been ordered have been reviewed, and 

results considered in the medical decision making process.





- Radiology


  ** Chest XR


Xray Interpretation: No Acute Changes - IMPRESSION:  LEFT BASILAR INFILTRATE 

AND LEFT-SIDED EFFUSION WITHOUT RADIOGRAPHIC CHANGE


Radiology Interpretation Completed By: Radiologist





- CT


  ** CT Brain


CT Interpretation: No Acute Changes - IMPRESSION:  1. No evidence for 

intracranial hemorrhage or acute intracranial process. 2. Mild involutional 

change.


CT Interpretation Completed By: Radiologist





  ** CTA Head/Neck


CT Interpretation: No Acute Changes - NECK ANGIOGRAM IMPRESSION:  1. Negative 

for carotid or vertebral artery stenosis or occlusion. 2. Mediastinal 

lymphadenopathy, LEFT pleural effusion with associated atelectasis, and RIGHT 

thyroid lobe nodule without change compared with the July 22, 2017 chest CT.  

HEAD ANGIOGRAM IMPRESSION: Negative for central intracranial large vessel 

arterial stenosis or occlusion.


CT Interpretation Completed By: Radiologist





- EKG


  ** 1232


Cardiac Rate: NL - 93 bpm


EKG Rhythm: Sinus Rhythm


Ectopy: None


EKG Interpretation: Borderline T abnormalities in the anterior leads.





- Additional Comments


Diagnostic Additional Comments: 





IMPRESSION: NORMAL MRI OF THE BRAIN





NIH Scale





- NIH Scale


Level of Consciousness: Alert/Keenly Responsive


Ask Patient the Month and His/Her Age: Both Correct


Ask Pt to Open/Close Eyes and /Release Non-Paretic Hand: Both Correctly


Best Gaze (Only Horizontal Eye Movement): Normal


Visual Field Testing: No Visual Loss


Facial Paresis-Pt to Smile & Close Eyes or Grimace Symmetry: Normal/Symmetrical


Motor Function - Right Arm: No Drift-Holds 10 Seconds


Motor Function - Left Arm: No Drift-Holds 10 Seconds


Motor Function - Right Leg: No Drift-Holds 10 Seconds


Motor Function - Left Leg: No Drift-Holds 10 Seconds


Limb Ataxia-Must be out of Proportion to Weakness Present: Absent


Sensory (Use Pinprick to Test Arms/Legs/Trunk/Face): Normal


Best Language (Describe Picture, Name Items): No Aphasia


Dysarthria (Read Several Words): Normal


Extinction and Inattention: No Abnormality


Total Score: 0





Re-Evaluation





- Re-Evaluation


  ** First Eval


Re-Evaluation Time: 16:15


Comment: Discussed performing a lumbar puncture with pt. Pt declined.





Course/Dx





- Course


Course Of Treatment: DR SANFORD SAW PATIENT IN ED.  ADMIT HOSPITALIST STABLE.





- Diagnoses


Provider Diagnoses: 


 Dizziness, Vision disturbance








- Physician Notifications


Discussed Care Of Patient With: Chandu Sanford


Time Discussed With Above Provider: 12:19


Instructed by Provider To: Other - He recommended a CTA in addition to the CT. 

1539: Discussed pt care with Dr. Maxi Schmitt, pt will be admitted. Discussed 

care of pt with Dr. Sanford again at 1632, updatng him that the pt refused the 

LP when offered.





Discharge





- Discharge Plan


Condition: Stable


Disposition: ADMITTED TO [facility]


Referrals: 


Prachi Hansen MD [Primary Care Provider] - 





The documentation as recorded by the Chance dong Angela accurately reflects 

the service I personally performed and the decisions made by me, Justin Craig MD.

## 2017-08-09 NOTE — RAD
INDICATION: Dizziness. Short of breath.     



COMPARISON: July 22, 2017

 

TECHNIQUE: An AP portable view obtained at 1222 hours is submitted.



FINDINGS: 



Bones/Soft Tissues: There are no acute bony findings.    



Cardiomediastinal: The cardiomediastinal silhouette is normal. 



Lungs: With left basilar airspace disease, unchanged. The right lung is clear..



Pleura: There is a small moderate-sized left-sided pleural effusion, unchanged. 



Other: None



IMPRESSION:  LEFT BASILAR INFILTRATE AND LEFT-SIDED EFFUSION WITHOUT RADIOGRAPHIC CHANGE

## 2017-08-10 LAB
BF RBC COUNT #1: 2
BF RBC COUNT #2: 2
BF WBC COUNT #1: 3
BF WBC COUNT #2: 3

## 2017-08-10 PROCEDURE — 009U3ZX DRAINAGE OF SPINAL CANAL, PERCUTANEOUS APPROACH, DIAGNOSTIC: ICD-10-PCS | Performed by: ANESTHESIOLOGY

## 2017-08-10 RX ADMIN — NYSTATIN SCH APPLIC: 100000 POWDER TOPICAL at 22:05

## 2017-08-10 RX ADMIN — NYSTATIN SCH APPLIC: 100000 POWDER TOPICAL at 11:30

## 2017-08-10 RX ADMIN — ENOXAPARIN SODIUM SCH: 40 INJECTION SUBCUTANEOUS at 19:19

## 2017-08-10 NOTE — CONS
CONSULTATION REPORT:

 

DATE OF CONSULT/DICTATION:  08/09/17

 

PATIENT OF:  Dr. Schmitt.

 

HISTORY OF PRESENT ILLNESS:  This is a 77-year-old woman, I am asked to elevate 
for complaints of dizziness and visual changes.  She has recently been 
diagnosed with an abdominal mass with significant associated ascites.  She 
recently had a biopsy at [facility] and [facility] is being contacted to 
confirm the diagnosis.  By patient's history, it is possibly lymphoma.  She has 
had significant ascites and has had large drainage of ascites within the past 
week.  In the past few days, she notes that she is dizzy in the evenings and 
when asked whether it is lightheadedness or room spinning, she states a little 
bit of both, but she basically says she feels drunk and staggers.  She did not 
know whether she was having similar feelings today because she just walked very 
little; but in the ER, when she walked, she had similar feeling of unsteadiness 
without true room spinning, without lightheadedness and I think that this is 
what she describes and she also feels weak all over, but there is no specific 
focal weakness.  This morning, when she was reading at the computer, her vision 
felt blurred in either eye and it appears to be a less of a complaint now, 
though slightly still present. She states that when she was reading letters and 
looking less intense also.  She had no numbness, no clear headache, no 
difficulties in speech, and she came in today because of her new visual 
symptoms.

 

ALLERGIES:  She is allergic to SULFA MEDICATIONS.

 

MEDICATIONS:  There are no medications that she takes at home.

 

PAST MEDICAL AND SURGICAL HISTORY:  She has no significant past medical history 
other than her most recent history of probable cancer and they are determining 
the type.  She had appendectomy as a teenager, but no other surgeries.

 

FAMILY HISTORY:  There is no family history of diabetes, heart disease, stroke.

 

SOCIAL HISTORY:  She does not smoke or use any substances.

 

REVIEW OF SYSTEMS:  Negative in all 14 spheres other than the HPI.

 

PHYSICAL EXAM:  Temperature 98, pulse 103, respirations 16, blood pressure is 
currently 125/65 as well as 103/73, which is somewhat lower than she has in the 
past.  Cranial nerves II through XII were intact.  I could not detect any 
disconjugate gaze.  She had full extraocular movements without nystagmus.  
There is no facial weakness.  Discs were sharp.  Motor exam revealed normal 
tone and strength was 5/5 throughout.  She had a mild intention tremor in her 
head and with reaching for objects, which she thinks is new.  There is no clear 
past pointing. Her walking was unsteady and slightly wide based.  She had an 
unsteady Romberg and after about 10 seconds, she had to open her eyes because 
she was afraid she was going to fall.  This occurred in the setting of not 
feeling lightheaded.  Sensation intact to light touch.  Reflexes were 1, trace 
ankle jerks, downgoing toes.  Lungs: There were rales in her bases.  Chest:  
Clear.  Cardiovascular:  Regular rate and rhythm.  Abdomen:  Full with 
decreased bowel sounds, but was soft.

 

DIAGNOSTIC STUDIES/LAB DATA:  When she first presented with the history of 
dizziness and visual changes, concern was that she could have a posterior 
circulation ischemic event.  CT scan was negative for acute changes and she had 
a CTA that showed no significant stenosis.  Of note, mediastinal 
lymphadenopathy is present and pleural effusion.

 

MRI scan was checked to rule out posterior fossa stroke versus tumor.  We could 
not do an MRI with contrast at that point because of the CTA dye, the MRI scan _
_____ to my eye showed some mild white matter disease, but not excessive for 
age.

 

Laboratories included white count 6.7, hematocrit of 37, platelet count of 291. 
INR and PTT normal.  CMP was abnormal for creatinine of 1.02.  C-reactive 
protein 20, total protein of 5.5, albumin of 3.1.  TSH 1.88.  She had normal 
liver functions.  Her urine specific gravity was 1.048, trace ketones.

 

IMPRESSION:  Suellen has new onset of changes in her gait which is wide based 
with an unsteady Romberg.  I think the dizziness that she is trying to describe 
is most likely an off balance feeling given the appearance of her walking, this 
in the setting of a probable malignancy, could be things such as inflammatory 
process involving her posterior fossa or brainstem such as a carcinomatous or 
lymphocytic meningitis.  I had discussed the reasons why I wanted a spinal tap, 
and she initially agreed.  When she came back from the MRI scan, she refused 
Dr. Craig's spinal tap.  Other possibilities could be a Guillain-Petersburg 
picture.  If she is still having continued symptoms, tomorrow we will do nerve 
conduction studies. Guillain-Petersburg would typically not explain the visual 
disturbance, but it is possible at least some cranial nerve involvement with 
some variant of Castro- Cabrales.  This is possible, but unlikely she is being 
placed on a monitored bed. Other things that would be possibly related to _____
_ would be myasthenic syndrome, this could explain her feeling of general 
weakness, but not really the imbalance. Visual symptoms could relate to this, 
but I think that this is less likely we can screen for this on the EMG nerve 
conduction study.  It is finally possible that she has decreased intravascular 
volume with her fluid getting extravascular in the form of ascites.  Some of 
her feeling of lightheadedness could be related to this, but I do not think it 
would cause her gait disturbance or possibly visual symptoms as being described.

 

Thank you for sharing her case.

 

 [id number]/CPS #: [id number]

CHANELLE

## 2017-08-10 NOTE — PN
Subjective


Date of Service: 08/10/17


Interval History: 





No additional LH or visual symptoms 


No pain


Feels some tenderness under right hemidiaphragm 





Objective


Active Medications: 








Acetaminophen (Tylenol Tab*)  650 mg PO Q4H PRN


   PRN Reason: FEVER/PAIN


Enoxaparin Sodium (Lovenox(*))  40 mg SUBCUT Q24H [facility]


   Last Admin: 08/09/17 18:01 Dose:  40 mg


Sodium Chloride (Ns 0.9% 1000 Ml*)  1,000 mls @ 150 mls/hr IV PER RATE [facility]


   Stop: 08/10/17 18:54


   Last Admin: 08/09/17 22:33 Dose:  150 mls/hr


Nystatin (Nystatin Top Powder*)  1 applic TOPICAL BID [facility]


   Last Admin: 08/10/17 11:30 Dose:  1 applic


Ondansetron HCl (Zofran Inj*)  4 mg IV Q4H PRN


   PRN Reason: NAUSEA








 Vital Signs











  08/09/17 08/09/17 08/09/17





  18:12 20:00 23:13


 


Temperature 98.0 F 98.4 F 


 


Pulse Rate 103 92 


 


Respiratory 16 18 17





Rate   


 


Blood Pressure 125/65 121/62 





(mmHg)   


 


O2 Sat by Pulse 96 95 





Oximetry   














  08/09/17 08/10/17 08/10/17





  23:42 03:48 07:53


 


Temperature 98.2 F 97.9 F 97.6 F


 


Pulse Rate 88 87 86


 


Respiratory 16 16 16





Rate   


 


Blood Pressure 121/62 129/72 126/63





(mmHg)   


 


O2 Sat by Pulse 95 96 96





Oximetry   














  08/10/17 08/10/17





  08:00 11:39


 


Temperature  97.9 F


 


Pulse Rate  93


 


Respiratory 16 16





Rate  


 


Blood Pressure  126/61





(mmHg)  


 


O2 Sat by Pulse  96





Oximetry  











Oxygen Devices in Use Now: None


Appearance: older than stated age, thin, nad


Eyes: No Scleral Icterus, PERRLA


Ears/Nose/Mouth/Throat: Clear Oropharnyx, Mucous Membranes Moist


Neck: NL Appearance and Movements; NL JVP, Trachea Midline


Respiratory: Symmetrical Chest Expansion and Respiratory Effort, Clear to 

Auscultation


Cardiovascular: RRR


Abdominal: - - large volume ascites 


Neurological: Alert and Oriented x 3


Result Diagrams: 


 08/09/17 12:30





 08/09/17 12:30


Additional Lab and Data: 


 Lab Results











  08/09/17 08/09/17 08/09/17 Range/Units





  12:30 12:30 12:30 


 


WBC  6.7    (3.5-10.8)  10^3/ul


 


RBC  4.55    (4.0-5.4)  10^6/ul


 


Hgb  12.0    (12.0-16.0)  g/dl


 


Hct  37    (35-47)  %


 


MCV  81    (80-97)  fL


 


MCH  26 L    (27-31)  pg


 


MCHC  32    (31-36)  g/dl


 


RDW  14    (10.5-15)  %


 


Plt Count  291    (150-450)  10^3/ul


 


MPV  7 L    (7.4-10.4)  um3


 


Neut % (Auto)  84.1 H    (38-83)  %


 


Lymph % (Auto)  5.0 L    (25-47)  %


 


Mono % (Auto)  10.0 H    (1-9)  %


 


Eos % (Auto)  0.5    (0-6)  %


 


Baso % (Auto)  0.4    (0-2)  %


 


Absolute Neuts (auto)  5.6    (1.5-7.7)  10^3/ul


 


Absolute Lymphs (auto)  0.3 L    (1.0-4.8)  10^3/ul


 


Absolute Monos (auto)  0.7    (0-0.8)  10^3/ul


 


Absolute Eos (auto)  0    (0-0.6)  10^3/ul


 


Absolute Basos (auto)  0    (0-0.2)  10^3/ul


 


Absolute Nucleated RBC  0    10^3/ul


 


Nucleated RBC %  0.1    


 


INR (Anticoag Therapy)   0.95   (0.89-1.11)  


 


APTT   31.7   (26.0-36.3)  seconds


 


Sodium    137  (133-145)  mmol/L


 


Potassium    4.0  (3.5-5.0)  mmol/L


 


Chloride    103  (101-111)  mmol/L


 


Carbon Dioxide    26  (22-32)  mmol/L


 


Anion Gap    8  (2-11)  mmol/L


 


BUN    21  (6-24)  mg/dL


 


Creatinine    1.02 H  (0.51-0.95)  mg/dL


 


Est GFR ( Amer)    67.9  (>60)  


 


Est GFR (Non-Af Amer)    52.8  (>60)  


 


BUN/Creatinine Ratio    20.6 H  (8-20)  


 


Glucose    89  ()  mg/dL


 


Lactic Acid     (0.5-2.0)  mmol/L


 


Calcium    9.1  (8.6-10.3)  mg/dL


 


Magnesium    1.9  (1.9-2.7)  mg/dL


 


Total Bilirubin    0.50  (0.2-1.0)  mg/dL


 


AST    22  (13-39)  U/L


 


ALT    14  (7-52)  U/L


 


Alkaline Phosphatase    62  ()  U/L


 


Troponin I    0.01  (<0.04)  ng/mL


 


C-Reactive Protein    20.35 H  (< 5.00)  mg/L


 


Total Protein    5.9 L  (6.4-8.9)  g/dL


 


Albumin    3.1 L  (3.2-5.2)  g/dL


 


Globulin    2.8  (2-4)  g/dL


 


Albumin/Globulin Ratio    1.1  (1-3)  


 


Lipase    13  (11.0-82.0)  U/L


 


TSH    1.88  (0.34-5.60)  mcIU/mL


 


Urine Color     


 


Urine Appearance     


 


Urine pH     (5-9)  


 


Ur Specific Gravity     (1.010-1.030)  


 


Urine Protein     (Negative)  


 


Urine Ketones     (Negative)  


 


Urine Blood     (Negative)  


 


Urine Nitrate     (Negative)  


 


Urine Bilirubin     (Negative)  


 


Urine Urobilinogen     (Negative)  


 


Ur Leukocyte Esterase     (Negative)  


 


Urine Glucose     (Negative)  














  08/09/17 08/09/17 Range/Units





  12:30 15:10 


 


WBC    (3.5-10.8)  10^3/ul


 


RBC    (4.0-5.4)  10^6/ul


 


Hgb    (12.0-16.0)  g/dl


 


Hct    (35-47)  %


 


MCV    (80-97)  fL


 


MCH    (27-31)  pg


 


MCHC    (31-36)  g/dl


 


RDW    (10.5-15)  %


 


Plt Count    (150-450)  10^3/ul


 


MPV    (7.4-10.4)  um3


 


Neut % (Auto)    (38-83)  %


 


Lymph % (Auto)    (25-47)  %


 


Mono % (Auto)    (1-9)  %


 


Eos % (Auto)    (0-6)  %


 


Baso % (Auto)    (0-2)  %


 


Absolute Neuts (auto)    (1.5-7.7)  10^3/ul


 


Absolute Lymphs (auto)    (1.0-4.8)  10^3/ul


 


Absolute Monos (auto)    (0-0.8)  10^3/ul


 


Absolute Eos (auto)    (0-0.6)  10^3/ul


 


Absolute Basos (auto)    (0-0.2)  10^3/ul


 


Absolute Nucleated RBC    10^3/ul


 


Nucleated RBC %    


 


INR (Anticoag Therapy)    (0.89-1.11)  


 


APTT    (26.0-36.3)  seconds


 


Sodium    (133-145)  mmol/L


 


Potassium    (3.5-5.0)  mmol/L


 


Chloride    (101-111)  mmol/L


 


Carbon Dioxide    (22-32)  mmol/L


 


Anion Gap    (2-11)  mmol/L


 


BUN    (6-24)  mg/dL


 


Creatinine    (0.51-0.95)  mg/dL


 


Est GFR ( Amer)    (>60)  


 


Est GFR (Non-Af Amer)    (>60)  


 


BUN/Creatinine Ratio    (8-20)  


 


Glucose    ()  mg/dL


 


Lactic Acid  0.8   (0.5-2.0)  mmol/L


 


Calcium    (8.6-10.3)  mg/dL


 


Magnesium    (1.9-2.7)  mg/dL


 


Total Bilirubin    (0.2-1.0)  mg/dL


 


AST    (13-39)  U/L


 


ALT    (7-52)  U/L


 


Alkaline Phosphatase    ()  U/L


 


Troponin I    (<0.04)  ng/mL


 


C-Reactive Protein    (< 5.00)  mg/L


 


Total Protein    (6.4-8.9)  g/dL


 


Albumin    (3.2-5.2)  g/dL


 


Globulin    (2-4)  g/dL


 


Albumin/Globulin Ratio    (1-3)  


 


Lipase    (11.0-82.0)  U/L


 


TSH    (0.34-5.60)  mcIU/mL


 


Urine Color   Yellow  


 


Urine Appearance   Clear  


 


Urine pH   5.0  (5-9)  


 


Ur Specific Gravity   1.048 H  (1.010-1.030)  


 


Urine Protein   Negative  (Negative)  


 


Urine Ketones   Trace H  (Negative)  


 


Urine Blood   Negative  (Negative)  


 


Urine Nitrate   Negative  (Negative)  


 


Urine Bilirubin   Negative  (Negative)  


 


Urine Urobilinogen   Negative  (Negative)  


 


Ur Leukocyte Esterase   Negative  (Negative)  


 


Urine Glucose   Negative  (Negative)  














Assess/Plan/Problems-Billing


Assessment: 





76 yo F recent dx lymphoma p/w visual symptoms found with peripheral neuropathy 





- Patient Problems


(1) Peripheral neuropathy


Comment: appreciate neurology c/s


EMG today confirms peripheral neuropathy 


LP today to evaluate for lymphocytic meningitis


monitor for progression 


   





(2) Pre-syncope


Comment: suspect in setting of volume depletion


s/p IVF


monitor


PT eval   





(3) Lymphoma


Comment: pt prefers to follow with keisha sanders   





(4) DVT prophylaxis


Comment: lovenox

## 2017-08-11 VITALS — SYSTOLIC BLOOD PRESSURE: 111 MMHG | DIASTOLIC BLOOD PRESSURE: 56 MMHG

## 2017-08-11 LAB
FOLATE SERPL-MCNC: 10.14 NG/ML (ref 3.99–?)
VIT B12 SERPL-MCNC: 407 PG/ML (ref 180–914)

## 2017-08-11 RX ADMIN — NYSTATIN SCH: 100000 POWDER TOPICAL at 14:21

## 2017-08-11 NOTE — PM
[facility] NOTE:

 

DATE OF VISIT:  08/10/17 - ROOM #447

 

CHIEF COMPLAINT:  Dizziness in the setting of lymphoma.

 

BRIEF HISTORY:  The patient is a 77-year-old female who was admitted to the 
hospital with complaints of dizziness and admitted to the hospital for further 
care.  She was recently diagnosed with follicular cell lymphoma.  She has a 
large amount of ascites and is part of the workup for her dizziness and near 
syncope. Dr. Sanford was consulted and recommended a lumbar puncture to rule 
out carcinomatous meningitis or lymphocytic meningitis.  I was called to 
provide the lumbar puncture for the patient.  I did review her medication and 
she had not received her Lovenox today.  She last received a dose last evening.
  She is scheduled to get one tonight.  I talked to her about the lumbar 
puncture, the risks, benefits, and alternatives to the procedure and after 
discussing with her, informed consent was obtained.

 

PROCEDURE NOTE:  The patient was placed in a sitting position.  Her whole back 
was prepped and draped in the usual sterile fashion.  3 cc of 1% lidocaine was 
used for local anesthesia at the L3-4 interspace.  I then used a 20-gauge 
introducer, inserted it into the interspinous ligament at L3-4.  I then 
proceeded to pass a 25- gauge Pencan needle into the subarachnoid space with 
good free flow of clear cerebrospinal fluid.  I proceeded to collect 4 vials of 
cerebrospinal fluid.  The needle and introducer were withdrawn and the CSF was 
sent to the laboratory for analysis.  The patient tolerated the procedure well.

 

 [id number]/CPS #: [id number]

MTDD

## 2017-08-11 NOTE — PN
NEUROLOGICAL FOLLOWUP NOTE:

 

DATE OF FOLLOWUP/DICTATION:  08/10/17

 

PATIENT OF:  Dr. Schmitt.

 

HISTORY:  Suellen got several liters of IV fluid last night and feels less 
lightheaded; however, she notes that she has never had a problem with walking 
and she feels unsure of herself and she is up and out of her bed and is 
concerned that she might fall, but this is a new sensation for her.  She does 
not feel any difference than she did yesterday.

 

Medications just p.r.n. Zofran, subcu Lovenox.

 

PHYSICAL EXAMINATION:  Temperature 97.9, pulse 89, respirations 16, blood 
pressure 126/61.  She is alert and oriented with normal speech and 
comprehension.  Cranial nerves II through XII are intact.  Fundi showed sharp 
discs.  There is no nystagmus.  Motor exam revealed normal tone and strength.  
Her gait remained somewhat unsteady and slightly wide based and she had an 
unsteady Romberg.  Glove- stocking and vibratory sensory loss and trace ankle 
jerks, but 1+ reflexes elsewhere.

 

ASSESSMENT:  Suellen most likely had a component of near syncope with some 
intravascular volume depletion; however, in addition she has signs that are 
most suggestive of a neuropathy with imbalance gait, glove-stocking and 
vibratory sensory loss.  We will get a nerve conduction study now.  She has 
declined a spinal tap, in the process of rediscussing this with her.  Physical 
Therapy will see her. She is clinically stable from yesterday and then we will 
discuss the plan with Dr. Schmitt further after I do the nerve conduction study.

 

Thank you for sharing her case.

 

 [id number]/CPS #: [id number]

CHANELLE

## 2017-08-12 NOTE — DS
DISCHARGE SUMMARY:

 

DATE OF ADMISSION:  08/09/17

 

DATE OF DISCHARGE:  08/11/17

 

PRIMARY CARE PROVIDER:  Dr. Hansen.

 

ONCOLOGIST:  Dr. Paramjit Aguilar at [facility].

 

PRIMARY DIAGNOSIS:  Near syncope.

 

SECONDARY DIAGNOSES:

1.  Lymphoma.

2.  Newly diagnosed peripheral neuropathy.

3.  Prediabetes.

4.  Reported history of chronic obstructive pulmonary disease.

5.  Hypertension.

 

MEDICATIONS ON DISCHARGE:  Include acetaminophen 650 mg every 4 hours as needed 
for fever or pain.

 

PROCEDURES PERFORMED DURING HOSPITAL STAY:

1.  Lumbar puncture.

2.  EMGs.

 

PERTINENT LABORATORY DATA:  CSF Gram-stain and culture, no organisms seen, no 
neutrophils seen.  Total CSF white blood cell count 3, total rbc's 2, total 
cell count 13, total glucose 61, and total protein 30.

 

Labs to follow up after discharge include:

1.  CSF cytology.

2.  Serum B12 and folate.

 

HISTORY OF PRESENT ILLNESS AND HOSPITAL COURSE:  This is a 77-year-old female 
with past medical history as outlined in the history of present illness on the 
day of admission including recently diagnosed left retroperitoneal mass 

_____ represent follicular lymphoma based on recent biopsy, pending a CT/PET 2 
days after discharge as well as excisional biopsy to rule high-grade B cell 
lymphoma with the above diagnosis in the setting of new abdominal ascites, 
status post large volume paracentesis on 07/24/17, presented to the hospital in 
the setting of presyncopal episode accompanied with visual changes.  She was 
admitted to the hospital and observed on telemetry without any notable 
arrhythmias.  She received several liters of IV crystalloids with resolution of 
her symptoms.  She received a Neurology consultation.  The detailed exam was 
notable for positive Romberg as well as wide- based gait and peripheral 
neuropathy in stocking-glove distribution.  Because of her previous diagnosis 
of lymphoma as well as new neurological symptoms and clinical presentation, the 
patient was admitted to the hospital and received a lumbar puncture, rule out 
such etiology as lymphocytic meningitis with the above findings resulting, 
although CSF cytology is still pending.  Additional EMGs were performed, which 
did indicate peripheral neuropathy, which was thought to be new, although not 
rapidly progressive during the course of her observation in the hospital stay.  
The patient will be discharged on no new medications, although B12 and folate 
were added on to her labs here should be followed as an outpatient and there 
should be attention paid to her clinical symptoms.  Should they be rapidly 
progressive, she should return to the emergency room, which was discussed at 
length with the patient.  There are also may be concern for checking SPEP/UPEP 
based on clinical findings.  During the course of hospital stay, she also had a 
CTA which was negative for central intracranial large vessel arterial stenosis 
as well as negative for carotid or vertebral artery occlusion.  In addition, 
she had an MRI with final impression of a normal MRI of the brain.

 

FOLLOWUP INSTRUCTIONS:  At followup, please:

1.  Evaluate for progression of peripheral neuropathy.

2.  Follow up for cytology of CSF.

3.  Follow up folate and B12.

4.  Consider SPEP/UPEP.

5.  Ensure followup with the patient's oncologist.

 

Reasons to return to the hospital included but not limited to recurrent or 
worsening symptoms including syncope or near syncope, changes in vision, 
changes in sensation or strength, nausea, vomiting, lightheadedness, chest pain 
or shortness of breath, bleeding from any source, inability to obtain or 
tolerate any medications discussed with the patient.  She acknowledged 
understanding.

 

TIME SPENT:  Greater than 75 minutes was spent in the discharge of this patient
, greater than half was spent face-to-face with the patient.

 

 [id number]/[facility] #: [id number]

CHANELLE

## 2017-08-12 NOTE — PN
NEUROLOGIC FOLLOWUP:

 

DATE OF FOLLOWUP/DICTATION:  08/11/17

 

PATIENT OF:  Dr. Schmitt.

 

HISTORY:  Suellen denies any change in her walking.  She still feels unsteady, 
but has felt this way for the past several days' time.  She has no headache and 
tolerated this spinal tap without any problem.  There is no significant 
numbness. She has had no visual blurriness recently.

 

MEDICATIONS:  Just her p.r.n. medicines and nystatin.

 

PHYSICAL EXAMINATION:  Temperature 97.2, pulse 90, respiration 16, blood 
pressure 115/61.  She is alert and oriented with normal speech and 
comprehension.  Cranial nerves II through XII were intact.  There was no 
nystagmus.  Motor exam revealed normal tone and strength.  Her walking was 
still somewhat unsteady with trace ankle jerks, but 1+ reflexes elsewhere.  
Chest clear.  Cardiovascular:  Regular rate and rhythm.  She still has 
significant ascites.

 

LABORATORY DATA:  Her spinal tap was done yesterday and showed 3 white cells, 2 
red cells, protein of 30, and cytology is pending.

 

Her EMG nerve conduction study showed a mild peripheral neuropathy, but along 
the tracts, but not prolonged.

 

I discussed with her and Dr. Schmitt if the spinal tap results do not show at 
first pass any evidence for lymphomatous meningitis and the protein was not 
elevated, which would argue against Guillain-Kent, there was evidence of a 
peripheral neuropathy which probably explains her imbalance, but without 
significant slowing or long tract and with a negative CSF and with the patient 
being unchanged over the past several days' time, I would not treat with IVIG 
at this point, but discussed this with her that if she worsens in the near 
future, she will need to come back and may need to be treated.  I have 
discussed with Dr. Schmitt and he will send off B12, folate, thyroid, SPEP 
before she leaves and she will be seeing her team at [facility] on Monday.  A copy 
of her records including imaging studies will go with her.  It is possible that 
this could be a paraneoplastic problem, but I would have [facility] sent the 
studies off to where they would want them go and the treatment for this would 
be primarily treating her underlying malignancy.  It is possible that this can 
still turn as Guillain-Kent, but it is not advancing in the testing so far.  
Does not strongly support that diagnosis.  I would like to see within the next 
month or so.

 

Thank you for sharing her case.

 

 [id number]/[facility] #: [id number]

CHANELLE

## 2017-08-30 ENCOUNTER — HOSPITAL ENCOUNTER (OUTPATIENT)
Dept: HOSPITAL 25 - ED | Age: 77
Setting detail: OBSERVATION
LOS: 2 days | Discharge: HOME | End: 2017-09-01
Attending: HOSPITALIST | Admitting: HOSPITALIST
Payer: MEDICARE

## 2017-08-30 DIAGNOSIS — D61.818: ICD-10-CM

## 2017-08-30 DIAGNOSIS — N13.30: ICD-10-CM

## 2017-08-30 DIAGNOSIS — E86.0: ICD-10-CM

## 2017-08-30 DIAGNOSIS — R11.2: Primary | ICD-10-CM

## 2017-08-30 DIAGNOSIS — G62.9: ICD-10-CM

## 2017-08-30 DIAGNOSIS — I31.3: ICD-10-CM

## 2017-08-30 DIAGNOSIS — J90: ICD-10-CM

## 2017-08-30 DIAGNOSIS — E88.3: ICD-10-CM

## 2017-08-30 DIAGNOSIS — R18.8: ICD-10-CM

## 2017-08-30 DIAGNOSIS — Z79.899: ICD-10-CM

## 2017-08-30 DIAGNOSIS — C82.93: ICD-10-CM

## 2017-08-30 LAB
ADD DIFF/SLIDE REVIEW?: (no result)
ALBUMIN SERPL BCG-MCNC: 3.1 G/DL (ref 3.2–5.2)
ALP SERPL-CCNC: 47 U/L (ref 34–104)
ALT SERPL W P-5'-P-CCNC: 9 U/L (ref 7–52)
ANION GAP SERPL CALC-SCNC: 8 MMOL/L (ref 2–11)
AST SERPL-CCNC: 10 U/L (ref 13–39)
BUN SERPL-MCNC: 18 MG/DL (ref 6–24)
BUN/CREAT SERPL: 28.6 (ref 8–20)
CALCIUM SERPL-MCNC: 8.9 MG/DL (ref 8.6–10.3)
CHLORIDE SERPL-SCNC: 99 MMOL/L (ref 101–111)
GLOBULIN SER CALC-MCNC: 1.9 G/DL (ref 2–4)
GLUCOSE SERPL-MCNC: 94 MG/DL (ref 70–100)
HCO3 SERPL-SCNC: 26 MMOL/L (ref 22–32)
HCT VFR BLD AUTO: 32 % (ref 35–47)
HGB BLD-MCNC: 10.5 G/DL (ref 12–16)
LIPASE SERPL-CCNC: < 10 U/L (ref 11–82)
MCH RBC QN AUTO: 26 PG (ref 27–31)
MCHC RBC AUTO-ENTMCNC: 33 G/DL (ref 31–36)
MCV RBC AUTO: 79 FL (ref 80–97)
POTASSIUM SERPL-SCNC: 3.8 MMOL/L (ref 3.5–5)
PROT SERPL-MCNC: 5 G/DL (ref 6.4–8.9)
RBC # BLD AUTO: 3.98 10^6/UL (ref 4–5.4)
SODIUM SERPL-SCNC: 133 MMOL/L (ref 133–145)
TROPONIN I SERPL-MCNC: 0 NG/ML (ref ?–0.04)
WBC # BLD AUTO: 0.2 10^3/UL (ref 3.5–10.8)

## 2017-08-30 PROCEDURE — 81003 URINALYSIS AUTO W/O SCOPE: CPT

## 2017-08-30 PROCEDURE — 83690 ASSAY OF LIPASE: CPT

## 2017-08-30 PROCEDURE — 80048 BASIC METABOLIC PNL TOTAL CA: CPT

## 2017-08-30 PROCEDURE — 36415 COLL VENOUS BLD VENIPUNCTURE: CPT

## 2017-08-30 PROCEDURE — 84484 ASSAY OF TROPONIN QUANT: CPT

## 2017-08-30 PROCEDURE — 80053 COMPREHEN METABOLIC PANEL: CPT

## 2017-08-30 PROCEDURE — 99284 EMERGENCY DEPT VISIT MOD MDM: CPT

## 2017-08-30 PROCEDURE — 87086 URINE CULTURE/COLONY COUNT: CPT

## 2017-08-30 PROCEDURE — 83605 ASSAY OF LACTIC ACID: CPT

## 2017-08-30 PROCEDURE — 96365 THER/PROPH/DIAG IV INF INIT: CPT

## 2017-08-30 PROCEDURE — G0378 HOSPITAL OBSERVATION PER HR: HCPCS

## 2017-08-30 PROCEDURE — 81015 MICROSCOPIC EXAM OF URINE: CPT

## 2017-08-30 PROCEDURE — 74176 CT ABD & PELVIS W/O CONTRAST: CPT

## 2017-08-30 PROCEDURE — 85060 BLOOD SMEAR INTERPRETATION: CPT

## 2017-08-30 PROCEDURE — 85730 THROMBOPLASTIN TIME PARTIAL: CPT

## 2017-08-30 PROCEDURE — 96375 TX/PRO/DX INJ NEW DRUG ADDON: CPT

## 2017-08-30 PROCEDURE — 85025 COMPLETE CBC W/AUTO DIFF WBC: CPT

## 2017-08-30 RX ADMIN — SIMETHICONE CHEW TAB 80 MG PRN MG: 80 TABLET ORAL at 23:58

## 2017-08-30 RX ADMIN — SODIUM CHLORIDE SCH MLS/HR: 900 IRRIGANT IRRIGATION at 10:33

## 2017-08-30 RX ADMIN — SODIUM CHLORIDE SCH MLS/HR: 900 IRRIGANT IRRIGATION at 17:36

## 2017-08-30 RX ADMIN — ALLOPURINOL SCH: 300 TABLET ORAL at 23:50

## 2017-08-30 NOTE — ED
Fracisco VASQUEZ Thomas, scribed for Emmanuel Brown on 08/30/17 at 0200 .





GI/ HPI





- HPI Summary


HPI Summary: 





The pt is a 78 y/o F presenting to the ED c/o N/V that began two days ago. She 

vomited twice this evening. The N/V is aggravated by eating and drinking. It is 

alleviated by nothing. The patient has treated the vomiting with Allopurinol 

PTA. Pt additionally c/o decreased urine output, decrease fluid intake, and 

decreased flatus. Pt denies fever and abd pain. She has a PMHx of follicular 

lymphoma stage II that was diagnosed 06/18/17. She reports that there is 

concern for metastasis and her mass is [id number]. She last had 

chemotherapy 7 days ago. This is the first of five chemotherapy courses. In the 

examination room, she refuses XR and CT. 





- History of Current Complaint


Chief Complaint: EDNauseaVomitDiarrh


Time Seen by Provider: 08/30/17 01:33


Stated Complaint: GENERAL ILLNESS


Hx Obtained From: Patient, Family/Caretaker - male in room


Onset/Duration: Started Days Ago - 2, Still Present


Timing: Constant


Pain Intensity: 0


Associated Signs and Symptoms: Positive: Nausea, Vomiting - 2x today, Other: - 

POS: decreased urine output, decreased fluid intake, decreased flatus.  Negative

: Fever, Abdominal Pain


Aggravating Factor(s): Food, Liquids


Alleviating Factor(s): Nothing





- Additional Pertinent History


Primary Care Physician: [id number]





- Allergy/Home Medications


Allergies/Adverse Reactions: 


 Allergies











Allergy/AdvReac Type Severity Reaction Status Date / Time


 


Sulfa Antibiotics AdvReac  See Comment Verified 08/09/17 11:37














PMH/Surg Hx/FS Hx/Imm Hx


Previously Healthy: No


Endocrine/Hematology History: 


   Denies: Hx Diabetes


Cardiovascular History: Reports: Hx Hypercholesterolemia, Other Cardiovascular 

Problems/Disorders - moderate atheroschlerosis


   Denies: Hx Hypertension, Hx Pacemaker/ICD


GI History: Reports: Hx Gastrointestinal Bleed, Other GI Disorders - abdominal 

mass, ascites


 History: Reports: Other  Problems/Disorders - UTI's


   Denies: Hx Renal Disease


Sensory History: Reports: Hx Contacts or Glasses


   Denies: Hx Hearing Aid


Opthamlomology History: Reports: Hx Contacts or Glasses


Neurological History: 


   Comment Only: Other Neuro Impairments/Disorders - mild Bell's Palsy 9/16


Psychiatric History: 


   Denies: Hx Panic Disorder





- Cancer History


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  FOLLICULAR 

LYMPHOMA


Hx Chemotherapy: No





- Surgical History


Surgery Procedure, Year, and Place: appy as "a teenager"


Infectious Disease History: No


Infectious Disease History: 


   Denies: Hx Clostridium Difficile, Hx Hepatitis, Hx Human Immunodeficiency 

Virus (HIV), Hx of Known/Suspected MRSA, Hx Shingles, Hx Tuberculosis, Hx Known/

Suspected VRE, Hx Known/Suspected VRSA, History Other Infectious Disease, 

Traveled Outside the [place] in Last 30 Days





- Family History


Known Family History: 


   Negative: Cardiac Disease - denies family hx of, Diabetes





- Social History


Alcohol Use: None


Hx Substance Use: No


Substance Use Type: Reports: None


Smoking Status (MU): Never Smoked Tobacco





Review of Systems


Negative: Fever


Positive: Vomiting, Nausea, Other - POS: decreased fluid intake, decreased 

flatus.  Negative: Abdominal Pain


Positive: other - POS: decreased urine output


All Other Systems Reviewed And Are Negative: Yes





Physical Exam


Triage Information Reviewed: Yes


Vital Signs On Initial Exam: 


 Initial Vitals











Temp Pulse Resp BP Pulse Ox


 


 98.2 F   99   18   149/74   95 


 


 08/30/17 01:25  08/30/17 01:25  08/30/17 01:25  08/30/17 01:25  08/30/17 01:25











Vital Signs Reviewed: Yes


Appearance: Positive: Well-Appearing, No Pain Distress


Skin: Positive: Warm, Skin Color Reflects Adequate Perfusion, Dry


Head/Face: Positive: Normal Head/Face Inspection


Eyes: Positive: EOMI, JAY


ENT: Positive: Other - Dry mucous membranes.


Neck: Positive: Supple, Nontender


Respiratory/Lung Sounds: Positive: Clear to Auscultation, Breath Sounds Present


Cardiovascular: Positive: RRR, Pulses are Symmetrical in both Upper and Lower 

Extremities


Abdomen Description: Positive: Soft, Distended


Bowel Sounds: Positive: Present


Musculoskeletal: Positive: Strength/ROM Intact, Edema Left, Edema Right


Neurological: Positive: Normal, Sensory/Motor Intact, Alert, Oriented to Person 

Place, Time





Diagnostics





- Vital Signs


 Vital Signs











  Temp Pulse Resp BP Pulse Ox


 


 08/30/17 01:25  98.2 F  99  18  149/74  95














- Laboratory


Lab Results: 


 Lab Results











  08/30/17 08/30/17 08/30/17 Range/Units





  02:55 02:55 02:55 


 


WBC   0.2 L   (3.5-10.8)  10^3/ul


 


RBC   3.98 L   (4.0-5.4)  10^6/ul


 


Hgb   10.5 L   (12.0-16.0)  g/dl


 


Hct   32 L   (35-47)  %


 


MCV   79 L   (80-97)  fL


 


MCH   26 L   (27-31)  pg


 


MCHC   33   (31-36)  g/dl


 


RDW   14   (10.5-15)  %


 


Plt Count   52 L   (150-450)  10^3/ul


 


MPV   8   (7.4-10.4)  um3


 


Neut % (Auto)   24.5 L   (38-83)  %


 


Lymph % (Auto)   31.0   (25-47)  %


 


Mono % (Auto)   34.2 H   (1-9)  %


 


Eos % (Auto)   9.3 H   (0-6)  %


 


Baso % (Auto)   1.0   (0-2)  %


 


Absolute Neuts (auto)   0.1 L*   (1.5-7.7)  10^3/ul


 


Absolute Lymphs (auto)   0.1 L   (1.0-4.8)  10^3/ul


 


Absolute Monos (auto)   0.1   (0-0.8)  10^3/ul


 


Absolute Eos (auto)   0   (0-0.6)  10^3/ul


 


Absolute Basos (auto)   0   (0-0.2)  10^3/ul


 


Absolute Nucleated RBC   0   10^3/ul


 


Nucleated RBC %   0.3   


 


APTT  58.4 H    (26.0-36.3)  seconds


 


Sodium    133  (133-145)  mmol/L


 


Potassium    3.8  (3.5-5.0)  mmol/L


 


Chloride    99 L  (101-111)  mmol/L


 


Carbon Dioxide    26  (22-32)  mmol/L


 


Anion Gap    8  (2-11)  mmol/L


 


BUN    18  (6-24)  mg/dL


 


Creatinine    0.63  (0.51-0.95)  mg/dL


 


Est GFR ( Amer)    118.5  (>60)  


 


Est GFR (Non-Af Amer)    92.1  (>60)  


 


BUN/Creatinine Ratio    28.6 H  (8-20)  


 


Glucose    94  ()  mg/dL


 


Calcium    8.9  (8.6-10.3)  mg/dL


 


Total Bilirubin    1.20 H  (0.2-1.0)  mg/dL


 


AST    10 L  (13-39)  U/L


 


ALT    9  (7-52)  U/L


 


Alkaline Phosphatase    47  ()  U/L


 


Troponin I    0.00  (<0.04)  ng/mL


 


Total Protein    5.0 L  (6.4-8.9)  g/dL


 


Albumin    3.1 L  (3.2-5.2)  g/dL


 


Globulin    1.9 L  (2-4)  g/dL


 


Albumin/Globulin Ratio    1.6  (1-3)  


 


Lipase    < 10 L  (11.0-82.0)  U/L











Result Diagrams: 


 08/30/17 02:55





 08/30/17 02:55


Lab Statement: Any lab studies that have been ordered have been reviewed, and 

results considered in the medical decision making process.





- CT


  ** CT Abd/Pel


CT Interpretation: Positive (See Comments) - Slight decrease in size of 25.6 cm 

long left retroperitoneal tumor which encases the left kidney and aorta. 

Moderate left hydronephrosis is likely unchanged. No change in moderate size 

left pleural effusion. Decreasing small pericardial effusion. Decreasing small 

to moderate amount of ascites.


CT Interpretation Completed By: Radiologist





Re-Evaluation





- Re-Evaluation


  ** First Eval


Re-Evaluation Time: 02:30


Change: Improved


Comment: The patient now consents to a CT Abd/Pel





GIGU Course/Dx





- Course


Assessment/Plan: The patient presents with N/V, decreased fluid intake, 

decreased flatus. She denies abd pain. She has a PMHx of follicular CA stage II 

diagnosed 06/2017. Initially, she refuses XR and CT and only wishes to be 

hydrated. However, after some time the patient consented to a CT Abd/Pel. In 

the ED course she was given IV fluids and Zofran. Labs were obtained. She is 

diagnosed with weakness, dehydration, and pancytopenia. I admitted the patient 

to Dr. Marquez.





- Diagnoses


Provider Diagnoses: 


 Weakness, Dehydration, Pancytopenia, Lymphoma, Neutropenia








- Physician Notifications


Discussed Care Of Patient With: Valdez Marquez


Time Discussed With Above Provider: 04:52


Instructed by Provider To: Other - I discussed the case with Dr. Marquez, 

nocturnist, who will admit the patient in the AM once a bed frees up.





- Critical Care Time


Critical Care Time: 30-74 min





Discharge





- Discharge Plan


Condition: Fair


Disposition: ADMITTED TO [facility]


Referrals: 


Prachi Hansen MD [Primary Care Provider] - 





The documentation as recorded by the Fracisco dong Thomas accurately reflects 

the service I personally performed and the decisions made by me, Emmanuel Brown.

## 2017-08-30 NOTE — HP
CC:  Dr. Hansen *

 

DATE OF ADMISSION:

 

CHIEF COMPLAINT:  Nausea, vomiting.

 

HISTORY OF PRESENT ILLNESS:  The patient is a 77-year-old woman who just 
underwent her first dose of immunotherapy for follicular lymphoma about a week 
ago. She said she ended up having tumor lysis syndrome and was hospitalized at 
[place] up until this past Saturday.  Since she has gotten home, however, she 
has been quite nauseated and unable to keep anything down.  She says just a 
glass of water makes her vomit.  She came back here on Sunday, but apparently 
was sent back home.  She has no abdominal pain, but just distended.  The ER 
doctor spoke to [facility], who wanted her admitted and rehydrated because it 
was necessary due to the type of chemotherapy she received and to make sure she 
keeps well hydrated.

 

PAST MEDICAL HISTORY:  Significant for the recently diagnosed follicular 
lymphoma in July 2017, status post R-CHOP therapy on 08/21 and who received 
Neulasta 08/23. She has ascites and moderate to severe left hydronephrosis.  
She has tumor lysis syndrome, peripheral neuropathy, hypertension.

 

MEDICATIONS:  Current medications include only.

1.  Allopurinol 300 mg daily.

2.  Acetaminophen 650 mg every four hours as needed.

 

ALLERGIES:  She has an allergy/adverse to SULFA ANTIBIOTICS.

 

FAMILY HISTORY:  The patient is adopted.

 

SOCIAL HISTORY:  No tobacco, no alcohol, recreational drug use.  She is a 
microbiologist.

 

REVIEW OF SYSTEMS:  A 14-point review of systems was completed with the 
patient. All pertinent positives and negatives are in the history of present 
illness, otherwise negative.

 

                               PHYSICAL EXAMINATION

 

GENERAL:  A pleasant woman lying in bed, in no acute distress.

 

VITAL SIGNS:  Blood pressure 126/67, pulse ox 93%, heart rate 94 beats per 
minute, temperature 98.2 degrees.

 

HEENT:  Normocephalic and atraumatic.  Pupils are equal, round and reactive to 
light.  Dry mucous membranes.

 

NECK:  Supple.  No JVD, bruits, palpable thyroid or lymphadenopathy.

 

CHEST:  Clear to auscultation and percussion bilaterally.

 

CARDIOVASCULAR:  S1, S2 appreciated.

 

ABDOMEN:  Distended, but no rebound, guarding or rigidity.  No pain.

 

EXTREMITIES:  No cyanosis, clubbing, or edema.  +2 peripheral pulses 
bilaterally.

 

NEUROLOGIC:  Alert and oriented x3.  Moves all extremities.

 

SKIN:  No rashes or abnormalities specific for skin turgor.

 

 LABORATORY DATA:  White count is 0.2, hemoglobin 10.5, hematocrit 32, 
platelets 5. Absolute neutrophil 0.1

 

Sodium 133, potassium 3.8, chloride 99, CO2 26, BUN 13, creatinine 0.63, 
glucose 94.

 

Abdominal pelvic CT not available for my review at this time.

 

ASSESSMENT AND PLAN:  

1.  Intractable nausea and vomiting. We will rehydrated the patient with normal 
150 cc an hour.  Zofran p.r.n. for nausea.  Hopefully this will resolve soon 
and the patient can go home.  The patient would like to go to [facility] if 
she stays for a prolonged period as she is well know there now and recently 
given her treatment that may have initiated this issue.

2.  Neutropenia.  The patient did not have a fever.  Apparently, the [facility] oncologist on-call wanted the patient to be put on Diflucan, acyclovir and 
Levaquin anyway.  She received in the ED.  However, since the patient has no 
fever, I do not see a reason to continue that now.  We can modify her regimen 
if necessary at a later time.

3.  FEN.  Regular diet.

4.  DVT prophylaxis.  She wears compression stockings.  No heparin with her 
thrombocytopenia.

5.  The patient is a full code.

 

TIME SEEN:  Over 75 minutes was spent on this H and P, more than 40 minutes was 
spent on direct face-to-face contact with the patient in evaluation, physical 
exam, counseling, and coordination of care.

 

[id number]/[facility] #: [id number]

MTDD

## 2017-08-30 NOTE — RAD
CLINICAL HISTORY: Abdominal pain and vomiting. Relevant medical history includes "probable

abdominal sarcoma" and appendectomy.



COMPARISON: Most recent CT of the abdomen is dated July 20, 2017



TECHNIQUE: Noncontrast CT examination of the abdomen and pelvis from the lung bases

through the initial tuberosities.



FINDINGS:



VISUALIZED LUNG BASES: 

Again seen is a moderate to large left-sided pleural effusion causing compressive

atelectasis of the left lower lobe.



ABDOMEN AND PELVIS:



Evaluation of the solid organs and vasculature is limited without intravenous contrast.



In the right subcapsular liver there is a fluid density cyst unchanged in the previous CT

examination. The liver is otherwise homogenous in attenuation with no biliary duct

dilatation or gross surface irregularity.



The spleen, pancreas and adrenal glands are grossly normal in appearance. The gallbladder

is normal.



The right kidney is normal in appearance without focal mass, calcification or signs of

hydronephrosis. The left kidney is poorly defined but there appears to be moderate to

severe hydronephrosis, perhaps reduced slightly when compared to the previous CT

examination.



The left kidney is encased in a heterogeneous soft tissue mass that was better delineated

on the patient's prior contrast enhanced CT examination. In the axial plane the mass

measures a maximum dimension of 8 x 11.2 cm, slightly decreased from 12.1 x 12.9 cm on the

previous CT examination. More inferiorly there is a retroperitoneal mass extending down

the left of midline retroperitoneum and into the left hemipelvis measuring a maximum axial

dimension of 6 x 10.6 cm, previously 8.6 x 12.5 cm.



There is a moderate amount of peritoneal ascites scattered throughout the abdomen, but

reduced slightly when compared to the July 20, 2017 CT examination.



Evaluation of the gastrointestinal tract is limited without oral contrast. The small bowel

does not appear to be pathologically distended. The gas-filled transverse colon measures

up to just under 5 cm in diameter but is not pathologically distended.



The pelvic viscera is normal in appearance.



The coarsely calcified abdominal aorta and iliac arteries are normal in course and

diameter.



At the inferior margin of the right iliac is muscle (image 73) there is an 8 mm

low-density focus that is not more completely characterize on this CT examination. 



Degenerative changes include multilevel loss of intervertebral disc height involving the

lower thoracic and lumbar spine.There are no sinister bone lesions.



IMPRESSION:



1. There has been a mild decrease in size of the left perinephric and left lower abdomen

and pelvic retroperitoneal soft tissue mass as well as a reduction in the amount of

peritoneal ascites. The moderate to large left-sided pleural effusion does not appear

changed from the previous CT examination.

2. Loops of colon are top normal but not pathologically dilated according to size

criteria.

3. Additional chronic and degenerative changes described in the body the report.

## 2017-08-31 LAB
ADD DIFF/SLIDE REVIEW?: (no result)
ADD PATH REVIEW?: YES
ANION GAP SERPL CALC-SCNC: 12 MMOL/L (ref 2–11)
BUN SERPL-MCNC: 14 MG/DL (ref 6–24)
BUN/CREAT SERPL: 25.5 (ref 8–20)
CALCIUM SERPL-MCNC: 8.4 MG/DL (ref 8.6–10.3)
CHLORIDE SERPL-SCNC: 99 MMOL/L (ref 101–111)
GLUCOSE SERPL-MCNC: 74 MG/DL (ref 70–100)
HCO3 SERPL-SCNC: 22 MMOL/L (ref 22–32)
HCT VFR BLD AUTO: 31 % (ref 35–47)
HGB BLD-MCNC: 10.3 G/DL (ref 12–16)
MCH RBC QN AUTO: 27 PG (ref 27–31)
MCHC RBC AUTO-ENTMCNC: 34 G/DL (ref 31–36)
MCV RBC AUTO: 79 FL (ref 80–97)
POTASSIUM SERPL-SCNC: 3.5 MMOL/L (ref 3.5–5)
RBC # BLD AUTO: 3.88 10^6/UL (ref 4–5.4)
SODIUM SERPL-SCNC: 133 MMOL/L (ref 133–145)
TOXIC GRANULES BLD QL SMEAR: (no result)
VARIANT LYMPHS # BLD MANUAL: 1 % (ref 0–6)
WBC # BLD AUTO: 1.8 10^3/UL (ref 3.5–10.8)

## 2017-08-31 RX ADMIN — SIMETHICONE CHEW TAB 80 MG PRN MG: 80 TABLET ORAL at 07:43

## 2017-08-31 RX ADMIN — ALLOPURINOL SCH MG: 300 TABLET ORAL at 22:33

## 2017-08-31 RX ADMIN — SIMETHICONE CHEW TAB 80 MG PRN MG: 80 TABLET ORAL at 17:34

## 2017-08-31 NOTE — PN
Subjective


Date of Service: 08/31/17


Interval History: 


.


evaluated patient earlier today


plan for dc in AM.


denies further N&V; compazine helped a lot as well as simethicone (reportedly)


patient in touch with physician at [facility] -- travelling there tomorrow 

morning with her son who rented a caravan to do so.





I called Dr. Garcia and she ended up speaking with patient about goals of 

appointment tomorrow.


Antiemetics were prescribed to her outpatient pharmacy


labs improved from yesterday; WBC 0.2 to 1.8





denies new c/o.





plan for dc in AM.


.





Family History: Unchanged from Admission


Social History: Unchanged from Admission


Past Medical History: Unchanged from Admission





Objective


Active Medications: 


.


Acetaminophen (Tylenol Tab*)  650 mg PO Q4H PRN


   PRN Reason: FEVER/PAIN


Allopurinol (Zyloprim Tab*)  300 mg PO BEDTIME [facility]


   Last Admin: 08/30/17 23:50 Dose:  Not Given


Lorazepam (Ativan Inj*)  0.5 mg IV PUSH Q4H PRN


   PRN Reason: ANXIETY


Omeprazole (Prilosec Cap*)  20 mg PO 0730 [facility]


   Last Admin: 08/31/17 07:40 Dose:  20 mg


Ondansetron HCl (Zofran Inj*)  8 mg IV Q4H PRN


   PRN Reason: NAUSEA


Prochlorperazine Edisylate (Compazine Inj*)  10 mg IV Q6H PRN


   PRN Reason: NAUSEA/VOMITING


Simethicone (Mylicon*)  80 mg PO Q6H PRN


   PRN Reason: INDIGESTION


   Last Admin: 08/31/17 17:34 Dose:  80 mg


.


 Vital Signs











  08/30/17 08/31/17 08/31/17





  23:41 03:22 07:43


 


Temperature 98.2 F 98.3 F 98.8 F


 


Pulse Rate 97 102 93


 


Respiratory 16 16 20





Rate   


 


Blood Pressure 144/67 149/76 129/55





(mmHg)   


 


O2 Sat by Pulse 95 96 95





Oximetry   














  08/31/17 08/31/17 08/31/17





  07:48 11:11 15:59


 


Temperature  98.1 F 98.5 F


 


Pulse Rate  93 93


 


Respiratory 19 18 23





Rate   


 


Blood Pressure  138/69 150/80





(mmHg)   


 


O2 Sat by Pulse  97 96





Oximetry   











Appearance: NAD


Ears/Nose/Mouth/Throat: Clear Oropharnyx


Neck: NL Appearance and Movements; NL JVP


Respiratory: Symmetrical Chest Expansion and Respiratory Effort


Cardiovascular: NL Sounds; No Murmurs; No JVD


Abdominal: - - distended with ascites


Lymphatic: No Cervical Adenopathy


Extremities: No Edema


Skin: No Rash or Ulcers


Neurological: Alert and Oriented x 3


Lines/Tubes/Other Access: Clean, Dry and Intact Peripheral IV


Nutrition: Taking PO's


Result Diagrams: 


 08/31/17 05:45





 08/31/17 05:45


Additional Lab and Data: 


.





Assess/Plan/Problems-Billing


.


Assessment: 


78 yo female s/p chemotherapy for follicular lymphoma, complicated by mild 

tumor lysis syndrome, now with recurrent N/V that has resolved with hydration 

and anti-emetics.





.








- Patient Problems


(1) Nausea and vomiting


Current Visit: Yes   Status: Acute   Priority: High   Code(s): R11.2 - NAUSEA 

WITH VOMITING, UNSPECIFIED   Comment: 


- IV zofran


- IV compazine


- IV fluids   





(2) Lymphoma


Current Visit: Yes   Status: Acute   Priority: High   Comment: 


- s/p chemotherapy   





(3) Tumor lysis syndrome following antineoplastic drug therapy


Current Visit: Yes   Status: Acute   Priority: High   Code(s): E88.3 - TUMOR 

LYSIS SYNDROME   Comment: 


- not active at this time; creatinine 0.55.

## 2017-09-01 VITALS — DIASTOLIC BLOOD PRESSURE: 69 MMHG | SYSTOLIC BLOOD PRESSURE: 129 MMHG

## 2017-09-01 RX ADMIN — SIMETHICONE CHEW TAB 80 MG PRN MG: 80 TABLET ORAL at 02:40

## 2017-09-01 RX ADMIN — SIMETHICONE CHEW TAB 80 MG PRN MG: 80 TABLET ORAL at 06:19

## 2017-09-01 NOTE — DS
CC:  Dr. Prachi Hansen; Dr. Pallawi Torka, [facility] *

 

DISCHARGE SUMMARY:

 

DATE OF ADMISSION:  08/30/17

 

DATE OF DISCHARGE:  09/01/17 (dictated in advance).

 

STATUS DURING HOSPITALIZATION:  Observation.

 

PRIMARY CARE PROVIDER:  Dr. Hansen, [facility].

 

ONCOLOGIST:  Dr. Garcia, [facility].

 

PRIMARY DISCHARGE DIAGNOSIS:  Nausea and vomiting following chemotherapy for 
follicular lymphoma approximately 1 week prior to admission.

 

SECONDARY DIAGNOSES:

1.  Follicular lymphoma diagnosed in July 2017, status post R-CHOP therapy on 
August 21st with subsequent Neulasta on August 23rd with ascites and moderate 
to severe left hydronephrosis.

2.  Tumor lysis syndrome.

3.  Peripheral neuropathy.

4.  Hypertension.

 

DISCHARGE MEDICATIONS:

1.  Allopurinol 300 mg by mouth daily.

2.  Acetaminophen 650 mg by mouth every 4 hours as needed.

3.  Zofran 4 mg p.o. q.6 hours p.r.n.

4.  Compazine 10 mg by mouth every 6 hours p.r.n. nausea and vomiting.

 

HISTORY OF PRESENT ILLNESS AND HOSPITAL COURSE:  Please see the H and P by Dr. Valdez Marquez on 08/30/17.  In brief, Ms. Lisa is a 77-year-old woman who 
underwent her first dose of immunotherapy for follicular lymphoma approximately 
a week prior to admission.  The patient struggled with tumor lysis syndrome in 
the period following therapy and was hospitalized at [place] up until a few 
days prior to this presentation.  The patient went home and was quite nauseated 
and unable to maintain any flood or liquid.  Even 1 glass of water made her 
vomit.  The patient came to the emergency room and was hydrated and went home, 
but she continued to do poorly. The ER team apparently spoke to [facility] 
Oncology team, who advised admission and rehydration secondary to the kind of 
chemotherapy received and to ensure she was well-hydrated.  This was 
accomplished and the patient had ongoing nausea and vomiting that resolved on 
the early morning of 08/31/17 unexpectedly with a dose of simethicone (
following fairly good doses of Zofran and Compazine).  The patient also 
received a dose of Ativan in hopes that there was an anxiety component to her 
continued nausea and vomiting.  Following the cessation of her nausea and 
vomiting, the patient was in better spirits.  She was interested in travelling 
to [facility] to speak with her oncologist.  She seemed so emphatic that she 
asked her son to rent a caravan and they were going to leave the evening of 
August 31st to be readmitted in [place].  I called Dr. Garcia and she spoke 
directly to Ms. Lisa and convinced the patient to accept an outpatient 
appointment on September 1st.  I agreed out of curtesy to allow Ms. Lisa to 
stay 1 more evening in observation status and leave early in the morning of 09/
01/17 with her son.  The exact time of her departure will be dependent on the 
final appointment that Dr. Garcia's staff will communicate to Ms. Lsia.

 

Ms. Lisa is stable at this time.  She was given return to ED instructions if 
she has any worrisome symptoms that arise or do not quickly jessica.  The patient 
had Zofran and Compazine prescribed to her outpatient pharmacy.  The discharge 
was prepared in advance and all questions will be referred back to Dr. Trujillo 
if any arise immediately following discharge.

 

TIME SPENT:  Total time taken to discharge Ms. Lisa was 45 minutes, greater 
than half that time was spent going of the details of the discharge 
instructions and will plan to follow up with Dr. Garcia in [facility].

 

 [id number]/[facility] #: [id number]

[facility]

## 2017-12-09 ENCOUNTER — HOSPITAL ENCOUNTER (EMERGENCY)
Dept: HOSPITAL 25 - ED | Age: 77
Discharge: HOME | End: 2017-12-09
Payer: MEDICARE

## 2017-12-09 VITALS — DIASTOLIC BLOOD PRESSURE: 54 MMHG | SYSTOLIC BLOOD PRESSURE: 118 MMHG

## 2017-12-09 DIAGNOSIS — E87.6: ICD-10-CM

## 2017-12-09 DIAGNOSIS — Z85.72: ICD-10-CM

## 2017-12-09 DIAGNOSIS — R00.2: Primary | ICD-10-CM

## 2017-12-09 DIAGNOSIS — E78.00: ICD-10-CM

## 2017-12-09 DIAGNOSIS — I48.91: ICD-10-CM

## 2017-12-09 LAB
ADD DIFF/SLIDE REVIEW?: (no result)
ADD PATH REVIEW?: YES
ALBUMIN SERPL BCG-MCNC: 3.8 G/DL (ref 3.2–5.2)
ALP SERPL-CCNC: 93 U/L (ref 34–104)
ALT SERPL W P-5'-P-CCNC: 10 U/L (ref 7–52)
ANION GAP SERPL CALC-SCNC: 7 MMOL/L (ref 2–11)
AST SERPL-CCNC: 17 U/L (ref 13–39)
BUN SERPL-MCNC: 24 MG/DL (ref 6–24)
BUN/CREAT SERPL: 36.9 (ref 8–20)
CALCIUM SERPL-MCNC: 9.5 MG/DL (ref 8.6–10.3)
CHLORIDE SERPL-SCNC: 105 MMOL/L (ref 101–111)
CK SERPL-CCNC: 12 U/L (ref 10–223)
DIGOXIN SERPL-MCNC: 1.6 NG/ML (ref 0.8–2)
GLOBULIN SER CALC-MCNC: 1.9 G/DL (ref 2–4)
GLUCOSE SERPL-MCNC: 111 MG/DL (ref 70–100)
HCO3 SERPL-SCNC: 27 MMOL/L (ref 22–32)
HCT VFR BLD AUTO: 27 % (ref 35–47)
HGB BLD-MCNC: 9.2 G/DL (ref 12–16)
HYPOCHROMIA BLD QL: (no result)
MAGNESIUM SERPL-MCNC: 1.9 MG/DL (ref 1.9–2.7)
MCH RBC QN AUTO: 31 PG (ref 27–31)
MCHC RBC AUTO-ENTMCNC: 33 G/DL (ref 31–36)
MCV RBC AUTO: 93 FL (ref 80–97)
POTASSIUM SERPL-SCNC: 3.4 MMOL/L (ref 3.5–5)
PROT SERPL-MCNC: 5.7 G/DL (ref 6.4–8.9)
RBC # BLD AUTO: 2.94 10^6/UL (ref 4–5.4)
SODIUM SERPL-SCNC: 139 MMOL/L (ref 133–145)
T3FREE SERPL-MCNC: 2.5 PG/ML (ref 2.5–3.9)
TROPONIN I SERPL-MCNC: 0.07 NG/ML (ref ?–0.04)
TSH SERPL-ACNC: 0.26 MCIU/ML (ref 0.34–5.6)
WBC # BLD AUTO: 18.2 10^3/UL (ref 3.5–10.8)

## 2017-12-09 PROCEDURE — 81003 URINALYSIS AUTO W/O SCOPE: CPT

## 2017-12-09 PROCEDURE — 85025 COMPLETE CBC W/AUTO DIFF WBC: CPT

## 2017-12-09 PROCEDURE — 80053 COMPREHEN METABOLIC PANEL: CPT

## 2017-12-09 PROCEDURE — 71020: CPT

## 2017-12-09 PROCEDURE — 83735 ASSAY OF MAGNESIUM: CPT

## 2017-12-09 PROCEDURE — 85730 THROMBOPLASTIN TIME PARTIAL: CPT

## 2017-12-09 PROCEDURE — 81015 MICROSCOPIC EXAM OF URINE: CPT

## 2017-12-09 PROCEDURE — 83880 ASSAY OF NATRIURETIC PEPTIDE: CPT

## 2017-12-09 PROCEDURE — 84481 FREE ASSAY (FT-3): CPT

## 2017-12-09 PROCEDURE — 85060 BLOOD SMEAR INTERPRETATION: CPT

## 2017-12-09 PROCEDURE — 84484 ASSAY OF TROPONIN QUANT: CPT

## 2017-12-09 PROCEDURE — 99283 EMERGENCY DEPT VISIT LOW MDM: CPT

## 2017-12-09 PROCEDURE — 84443 ASSAY THYROID STIM HORMONE: CPT

## 2017-12-09 PROCEDURE — 82550 ASSAY OF CK (CPK): CPT

## 2017-12-09 PROCEDURE — 83605 ASSAY OF LACTIC ACID: CPT

## 2017-12-09 PROCEDURE — 93005 ELECTROCARDIOGRAM TRACING: CPT

## 2017-12-09 PROCEDURE — 36415 COLL VENOUS BLD VENIPUNCTURE: CPT

## 2017-12-09 PROCEDURE — 80162 ASSAY OF DIGOXIN TOTAL: CPT

## 2017-12-09 PROCEDURE — 84439 ASSAY OF FREE THYROXINE: CPT

## 2017-12-09 RX ADMIN — POTASSIUM CHLORIDE ONE MEQ: 1.5 POWDER, FOR SOLUTION ORAL at 13:14

## 2017-12-09 RX ADMIN — POTASSIUM CHLORIDE ONE: 1.5 POWDER, FOR SOLUTION ORAL at 13:24

## 2017-12-09 NOTE — CONS
CONSULTATION REPORT: *

 

ADDENDUM:  The patient's repeat troponin was exactly the same as previously at 
0.07.  At this point, the patient has no symptoms of palpitations, chest pain, 
or shortness of breath.  She received 40 mEq of potassium to correct her 
hypokalemia with a potassium on presentation at 3.4.  I discussed the case with 
Dr. Garland.  The patient is agreeable to going home.  I discussed with the 
patient possibility of vagal maneuvers for AFib cessation if it happens in the 
future.  I told her that in the future if she is in atrial fibrillation to try 
vagal maneuvers to come in the ED if she develops shortness of breath, chest 
pain, or other worrisome symptoms. She also is requested to come to emergency 
department or call Dr. Willett's office if the palpitations recur and last for 
prolonged period of time.

 

The patient is going to be discharged with 5 days worth of prescription for 
potassium chloride at 20 mEq daily and that was discussed with Dr. Garland.

 

 [id number]/[facility] #: [id number]

[facility]

## 2017-12-09 NOTE — CONS
*** ADDENDUM NOW INCLUDED ON THIS REPORT ***



CC:  Dr. Hansen; Dr. Willett; Dr. Garcia, [facility],  [place], [place]; Dr. Garland *

 

CONSULTATION REPORT:

 

DATE OF CONSULT:  12/09/17

 

REQUESTING PHYSICIAN:  Dr. Garland.

 

PRIMARY CARE PROVIDER:  Dr. Hansen.

 

REASON FOR CONSULTATION:  Atrial fibrillation.

 

HISTORY OF PRESENT ILLNESS:  Suellen Lisa is a 77-year-old female with history 
of follicular lymphoma diagnosed in July of 2017, currently undergoing 
chemotherapy with R-CHOP.  Her last chemotherapy was last week.  For the past 4 
days, she had been on prednisone 80 mg daily.  She stated that today when she 
took her prednisone pill, she noted her heart was fluttering again.  Last time 
she had atrial fibrillation was on 10/30/17 when she was noted to be in 
paroxysmal atrial fibrillation and at that point she was started on digoxin by 
Dr. Willett.  The patient stated that she took an additional dose of 0.125 mg of 
digoxin and by the time she presented to the emergency department, she was back 
in a sinus rhythm. She stated that her episode of palpitations lasted 
approximately half an hour or so.  She denies any chest pain or shortness of 
breath with it.  Here, she was noted to be mildly hypokalemic which is going to 
be replaced.

 

The patient was last seen by myself during her hospital stay at the end of 
October 2017.  At this point, the patient was noted to have moderate to large 
left-sided pleural effusion.  The patient stated that since then she has had 
this effusion tapped at [facility].

 

PAST MEDICAL HISTORY:

1.  Follicular lymphoma as mentioned above.

2.  History of Lyme disease in 2017.

3.  History of prediabetes.

4.  Hypertension in the past, currently the patient has low normal blood 
pressures since her chemotherapy.

5.  History of hiatal hernia.

6.  History of paroxysmal atrial fibrillation, not anticoagulated due to 
thrombocytopenia.

7.  Status post D and C.

8.  Appendectomy.

9.  History of retroperitoneal mass biopsy that turned out to be follicular 
lymphoma.

 

CURRENT MEDICATIONS:  Include:

1.  Digoxin 0.125 mg, patient takes a tablet every other day and 2 tablets on 
Mondays, Wednesdays, and Fridays.

2.  Zofran on a p.r.n. basis.

3.  Vitamin B12 1000 mcg daily.

4.  Acetaminophen on a p.r.n. basis.

 

The patient also had been on prednisone 80 mg daily for the past 4 days.  The 
patient stated that today was her last dose of prednisone after her 
chemotherapy.

 

ALLERGIES:  Include SULFA AND LATEX.

 

FAMILY HISTORY:  Mom is living at the age of 93 years old with history of 
stroke on the mom's side.

 

SOCIAL HISTORY:  The patient is single.  She runs an environmental microbiology 
lab.  She denies any tobacco, alcohol, or drug use.  She indicated her son, Ed 
is her healthcare proxy.  His phone number is [phone].

 

REVIEW OF SYSTEMS:  Please see history of present illness.  The patient's 
weight has been stable for the past month.  She denies any chest pain or 
shortness of breath.

 

Her appetite had been fair.

 

All the remaining 12 systems were reviewed with the patient and were otherwise 
negative.

 

PHYSICAL EXAM:  Blood pressure 122/64, heart rate of 81 and regular, 
respiratory rate 18, oxygen saturation 95% on room air, temperature of 97.4.  
General:  This is a very pleasant 77-year-old female who is of thin body 
habitus.  The patient is in no acute distress.  Alert, awake, and oriented x3.  
HEENT:  Head:  Atraumatic, normocephalic.  Eyes:  Pupils are equal, round, and 
reactive to light and accommodation.  Oropharynx clear.  Mucosa moist.  Neck:  
Supple.  No JVD.  No bruits bilaterally.  Cardiovascular:  Regular rate and 
rhythm.  No murmur. Respiratory:  Distant breath sounds at left lower base, 
otherwise clear.  Abdomen: Soft, nontender.  Bowel sounds are present in all 4 
quadrants.  Extremities:  There is no edema.  Pulses are +2 bilaterally.  No 
clubbing or cyanosis.  On evaluation of the skin, no ecchymotic areas or rashes 
noted.  Neuro Evaluation:  Speech clear. Cranial nerves II through XII are 
grossly intact.  Motor strength is 5/5 bilaterally.

 

DIAGNOSTIC STUDIES/LAB DATA:  White blood cell count of 18.2, hemoglobin 9.2, 
hematocrit 27, and platelets 53.

Sodium 139, potassium 3.4, chloride 105, carbon dioxide 27, BUN 24, creatinine 
0.65. Liver function tests unremarkable.  Troponin of 0.07.  Brain natriuretic 
peptide was 524.  TSH was slightly low at 0.26, but the patient's free T4 and 
free T3 were within normal limits at 0.94 and 2.5 respectively.

 

Urinalysis was unremarkable.

 

Digoxin level was 1.6 that was right after the patient took 0.25 mg of digoxin.

 

The patient's chest x-ray was read by the radiologist as "persistent moderately 
large dependent left pleural effusion with proportion of atelectasis.  No 
compelling evidence for pulmonary edema."

 

The patient's EKG showed normal sinus rhythm with heart rate of 71 beats per 
minute with normal axis and no ST changes.

 

ASSESSMENT AND PLAN:  A 77-year-old female with history of paroxysmal atrial 
fibrillation, who presents after an episode of half an hour of atrial 
fibrillation which resolved after she decided to take an additional dose of 
digoxin.  At this point, the patient's troponin level was mildly elevated, 
which is consistent with mild elevation in the past when she presented with 
atrial fibrillation.  On 10/29/17, the patient's troponin peaked at 0.14.  At 
this point, the patient is going to be continued to be observed in the 
emergency department and we will check troponin at approximately 4 to 5 hours 
after the initial blood draw.  If her troponin continues to climb, the patient 
is going to be placed on overnight observation and if the troponin decreases, 
the patient is going to be discharged home with recommendations of no change in 
any medications and to follow up with Dr. Willett. The plan was discussed with 
Dr. Garland from the emergency department who agrees.

 

TIME SPENT:  Approximately 62 minutes were spent on consultation of this patient
, more than half that time was spent face-to-face with the patient doing the 
interview and physical exam.



ADDENDUM:  



The patient's repeat troponin was exactly the same as previously at 0.07.  At 
this point, the patient has no symptoms of palpitations, chest pain, or 
shortness of breath.  She received 40 mEq of potassium to correct her 
hypokalemia with a potassium on presentation at 3.4.  I discussed the case with 
Dr. Garland.  The patient is agreeable to going home.  I discussed with the 
patient possibility of vagal maneuvers for AFib cessation if it happens in the 
future.  I told her that in the future if she is in atrial fibrillation to try 
vagal maneuvers to come in the ED if she develops shortness of breath, chest 
pain, or other worrisome symptoms. She also is requested to come to emergency 
department or call Dr. Willett's office if the palpitations recur and last for 
prolonged period of time.

 

The patient is going to be discharged with 5 days worth of prescription for 
potassium chloride at 20 mEq daily and that was discussed with Dr. Garland.

 

 [id number] #: [id number]

A- [id number]/[id number] #: [id number]

MTDD

## 2017-12-09 NOTE — RAD
Indication: Palpitations. Oncology patient on chemotherapy.



Comparison: October 29, 2017 CT.



Technique: Sitting AP and lateral chest views.



Report: Moderately large dependent LEFT pleural effusion with proportional atelectasis is

unchanged compared with the October 29, 2017 exam. The RIGHT lung and pleural space remain

grossly clear. Negative for pneumothorax. Tip of RIGHT chest port is at the level of the

superior vena cava directed central without change. Negative for cardiomegaly.

Unremarkable central pulmonary vasculature and mediastinal contours.



Negative for free air beneath the diaphragm.



IMPRESSION: Persistent moderately large dependent LEFT pleural effusion with proportional

atelectasis. No compelling evidence for pulmonary edema.

## 2017-12-10 NOTE — ED
Zoran VASQUEZ Alfonso, scribed for Amos Garland MD on 12/09/17 at 1010 .





Palpitations / Dysrhythmia





- HPI Summary


HPI Summary: 


 This patient is a 77 year old F BIBA to [facility] with a chief complaint of 

palpitations since earlier today, soon after taking the final prednisone tablet 

in a course. Patient reports feeling herself going into A-Fib. She took an 

extra dose of Digoxin PTA. The patient rates the pain 0/10 in severity. 

Symptoms alleviated by nothing.





- History of Current Complaint


Chief Complaint: EDDysrhythmPalp


Time Seen by Provider: 12/09/17 10:03


Hx Obtained From: Patient


Onset/Duration: Sudden Onset, Lasting Minutes, Still Present


Timing: Constant


Character: Irregular


Alleviating: Nothing


Associated Signs & Symptoms: Negative





- Allergy/Home Medications


Allergies/Adverse Reactions: 


 Allergies











Allergy/AdvReac Type Severity Reaction Status Date / Time


 


Sulfa Antibiotics AdvReac  See Comment Verified 08/09/17 11:37


 


"some chemo drugs" Allergy  Unknown Uncoded 12/09/17 10:00





   Reaction  





   Details  











Home Medications: 


 Home Medications





Cyclophosphamide*  12/09/17 [History]


DOXOrubicin*  12/09/17 [History]


Digoxin TAB* [Lanoxin TAB*] 0.125 mg PO SEE INSTRUCTIONS 12/09/17 [History 

Confirmed 12/09/17]


riTUXimab*  12/09/17 [History]


vinCRIStine*  12/09/17 [History]











PMH/Surg Hx/FS Hx/Imm Hx


Endocrine/Hematology History: 


   Denies: Hx Diabetes


Cardiovascular History: Reports: Hx Atrial Fibrillation, Hx Hypercholesterolemia

, Other Cardiovascular Problems/Disorders - moderate atheroschlerosis


   Denies: Hx Hypertension, Hx Pacemaker/ICD


GI History: Reports: Hx Gastrointestinal Bleed, Other GI Disorders - abdominal 

mass, ascites


 History: Reports: Other  Problems/Disorders - UTI's


   Denies: Hx Renal Disease


Sensory History: Reports: Hx Contacts or Glasses


   Denies: Hx Hearing Aid


Opthamlomology History: Reports: Hx Contacts or Glasses


EENT History: 


   Denies: Hx Deafness


Neurological History: 


   Comment Only: Other Neuro Impairments/Disorders - mild Bell's Palsy 9/16


Psychiatric History: 


   Denies: Hx Panic Disorder





- Cancer History


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  FOLLICULAR 

LYMPHOMA


Hx Chemotherapy: No





- Surgical History


Surgery Procedure, Year, and Place: appy as "a teenager"


Infectious Disease History: No


Infectious Disease History: 


   Denies: Hx Clostridium Difficile, Hx Hepatitis, Hx Human Immunodeficiency 

Virus (HIV), Hx of Known/Suspected MRSA, Hx Shingles, Hx Tuberculosis, Hx Known/

Suspected VRE, Hx Known/Suspected VRSA, History Other Infectious Disease, 

Traveled Outside the [place] in Last 30 Days





- Family History


Known Family History: 


   Negative: Cardiac Disease - denies family hx of, Diabetes





- Social History


Alcohol Use: Rare


Hx Substance Use: Yes


Substance Use Type: Reports: Marijuana


Hx Tobacco Use: No


Smoking Status (MU): Never Smoked Tobacco





Review of Systems


Negative: Fever


Positive: Palpitations


All Other Systems Reviewed And Are Negative: Yes





Physical Exam





- Summary


Physical Exam Summary: 





VITAL SIGNS: Reviewed.


GENERAL:  Patient is an elderly and nourished female who is lying comfortable 

in the stretcher.  Patient is not in any acute respiratory distress.


HEAD AND FACE: No signs of trauma.  No ecchymosis, hematomas or skull 

depressions. No sinus tenderness.


EYES: PERRLA, EOMI x 2, No injected conjunctiva, no nystagmus.


EARS: Hearing grossly intact. Ear canals and tympanic membranes are within 

normal limits.


MOUTH: Oropharynx within normal limits.


NECK: Supple, trachea is midline, no adenopathy, no JVD, no carotid bruit, no c-

spine tenderness, neck with full ROM.


CHEST: Symmetric, no tenderness at palpation


LUNGS: Clear to auscultation bilaterally. No wheezing or crackles.


CVS: Regular rate and rhythm, S1 and S2 present, no murmurs or gallops 

appreciated.


ABDOMEN: Soft, non-tender. No signs of distention. No rebound no guarding, and 

no masses palpated. Bowel sounds are normal.


EXTREMITIES: FROM in all major joints, no edema, no cyanosis or clubbing.


NEURO: Alert and oriented x 3. No acute neurological deficits. Speech is normal 

and follows commands.


SKIN: Dry and warm





Triage Information Reviewed: Yes


Vital Signs On Initial Exam: 


 Initial Vitals











Temp Pulse Resp BP Pulse Ox


 


 97.4 F   74   16   114/65   98 


 


 12/09/17 09:55  12/09/17 09:55  12/09/17 09:55  12/09/17 09:55  12/09/17 09:55











Vital Signs Reviewed: Yes





- [place] Coma Scale


Coma Scale Total: 15





Diagnostics





- Vital Signs


 Vital Signs











  Temp Pulse Resp BP Pulse Ox


 


 12/09/17 09:55  97.4 F  74  16  114/65  98














- Laboratory


Lab Results: 


 Lab Results











  12/09/17 12/09/17 12/09/17 Range/Units





  10:10 10:15 10:15 


 


WBC   18.2 H   (3.5-10.8)  10^3/ul


 


RBC   2.94 L   (4.0-5.4)  10^6/ul


 


Hgb   9.2 L   (12.0-16.0)  g/dl


 


Hct   27 L   (35-47)  %


 


MCV   93   (80-97)  fL


 


MCH   31   (27-31)  pg


 


MCHC   33   (31-36)  g/dl


 


RDW   16 H   (10.5-15)  %


 


Plt Count   53 L   (150-450)  10^3/ul


 


MPV   9   (7.4-10.4)  um3


 


Immature Gran % (Auto)   2   (0-9)  %


 


Absolute Neuts (auto)   18.2 H   (1.5-7.7)  10^3/ul


 


Absolute Lymphs (auto)   0 L   (1.0-4.8)  10^3/ul


 


Absolute Monos (auto)   0   (0-0.8)  10^3/ul


 


Absolute Eos (auto)   0   (0-0.6)  10^3/ul


 


Absolute Basos (auto)   0   (0-0.2)  10^3/ul


 


Absolute Nucleated RBC   Not Reportable   


 


Neutrophils %   98 H   (38-83)  %


 


Band Neutrophils %   2   (0-8)  %


 


Normal RBC Morphology   Not Reportable   


 


Hypochromasia   1+   


 


Hem Pathologist Commnt   Pending   


 


APTT     (26.0-36.3)  seconds


 


Sodium    139  (133-145)  mmol/L


 


Potassium    3.4 L  (3.5-5.0)  mmol/L


 


Chloride    105  (101-111)  mmol/L


 


Carbon Dioxide    27  (22-32)  mmol/L


 


Anion Gap    7  (2-11)  mmol/L


 


BUN    24  (6-24)  mg/dL


 


Creatinine    0.65  (0.51-0.95)  mg/dL


 


Est GFR ( Amer)    114.3  (>60)  


 


Est GFR (Non-Af Amer)    88.9  (>60)  


 


BUN/Creatinine Ratio    36.9 H  (8-20)  


 


Glucose    111 H  ()  mg/dL


 


Lactic Acid     (0.5-2.0)  mmol/L


 


Calcium    9.5  (8.6-10.3)  mg/dL


 


Magnesium    1.9  (1.9-2.7)  mg/dL


 


Total Bilirubin    0.60  (0.2-1.0)  mg/dL


 


AST    17  (13-39)  U/L


 


ALT    10  (7-52)  U/L


 


Alkaline Phosphatase    93  ()  U/L


 


Total Creatine Kinase    12  ()  U/L


 


Troponin I    0.07 H*  (<0.04)  ng/mL


 


B-Natriuretic Peptide    ( - 100) pg/mL


 


Total Protein    5.7 L  (6.4-8.9)  g/dL


 


Albumin    3.8  (3.2-5.2)  g/dL


 


Globulin    1.9 L  (2-4)  g/dL


 


Albumin/Globulin Ratio    2.0  (1-3)  


 


TSH    0.26 L  (0.34-5.60)  mcIU/mL


 


Free T4    0.94  (0.61-1.12)  ng/dL


 


Free T3    2.50  (2.5-3.9)  pg/mL


 


Urine Color  Yellow    


 


Urine Appearance  Clear    


 


Urine pH  7.0    (5-9)  


 


Ur Specific Gravity  1.004 L    (1.010-1.030)  


 


Urine Protein  Negative    (Negative)  


 


Urine Ketones  Negative    (Negative)  


 


Urine Blood  1+ H    (Negative)  


 


Urine Nitrate  Negative    (Negative)  


 


Urine Bilirubin  Negative    (Negative)  


 


Urine Urobilinogen  Negative    (Negative)  


 


Ur Leukocyte Esterase  Negative    (Negative)  


 


Urine WBC (Auto)  Absent    (Absent)  


 


Urine RBC (Auto)  Trace(0-2/hpf)    (Absent)  


 


Urine Bacteria  Absent    (Absent)  


 


Urine Glucose  Negative    (Negative)  


 


Digoxin    1.6  (0.8-2.0)  ng/ml














  12/09/17 12/09/17 12/09/17 Range/Units





  10:15 10:15 10:15 


 


WBC     (3.5-10.8)  10^3/ul


 


RBC     (4.0-5.4)  10^6/ul


 


Hgb     (12.0-16.0)  g/dl


 


Hct     (35-47)  %


 


MCV     (80-97)  fL


 


MCH     (27-31)  pg


 


MCHC     (31-36)  g/dl


 


RDW     (10.5-15)  %


 


Plt Count     (150-450)  10^3/ul


 


MPV     (7.4-10.4)  um3


 


Immature Gran % (Auto)     (0-9)  %


 


Absolute Neuts (auto)     (1.5-7.7)  10^3/ul


 


Absolute Lymphs (auto)     (1.0-4.8)  10^3/ul


 


Absolute Monos (auto)     (0-0.8)  10^3/ul


 


Absolute Eos (auto)     (0-0.6)  10^3/ul


 


Absolute Basos (auto)     (0-0.2)  10^3/ul


 


Absolute Nucleated RBC     


 


Neutrophils %     (38-83)  %


 


Band Neutrophils %     (0-8)  %


 


Normal RBC Morphology     


 


Hypochromasia     


 


Hem Pathologist Commnt     


 


APTT   30.9   (26.0-36.3)  seconds


 


Sodium     (133-145)  mmol/L


 


Potassium     (3.5-5.0)  mmol/L


 


Chloride     (101-111)  mmol/L


 


Carbon Dioxide     (22-32)  mmol/L


 


Anion Gap     (2-11)  mmol/L


 


BUN     (6-24)  mg/dL


 


Creatinine     (0.51-0.95)  mg/dL


 


Est GFR ( Amer)     (>60)  


 


Est GFR (Non-Af Amer)     (>60)  


 


BUN/Creatinine Ratio     (8-20)  


 


Glucose     ()  mg/dL


 


Lactic Acid  1.6    (0.5-2.0)  mmol/L


 


Calcium     (8.6-10.3)  mg/dL


 


Magnesium     (1.9-2.7)  mg/dL


 


Total Bilirubin     (0.2-1.0)  mg/dL


 


AST     (13-39)  U/L


 


ALT     (7-52)  U/L


 


Alkaline Phosphatase     ()  U/L


 


Total Creatine Kinase     ()  U/L


 


Troponin I     (<0.04)  ng/mL


 


B-Natriuretic Peptide    524 H ( - 100) pg/mL


 


Total Protein     (6.4-8.9)  g/dL


 


Albumin     (3.2-5.2)  g/dL


 


Globulin     (2-4)  g/dL


 


Albumin/Globulin Ratio     (1-3)  


 


TSH     (0.34-5.60)  mcIU/mL


 


Free T4     (0.61-1.12)  ng/dL


 


Free T3     (2.5-3.9)  pg/mL


 


Urine Color     


 


Urine Appearance     


 


Urine pH     (5-9)  


 


Ur Specific Gravity     (1.010-1.030)  


 


Urine Protein     (Negative)  


 


Urine Ketones     (Negative)  


 


Urine Blood     (Negative)  


 


Urine Nitrate     (Negative)  


 


Urine Bilirubin     (Negative)  


 


Urine Urobilinogen     (Negative)  


 


Ur Leukocyte Esterase     (Negative)  


 


Urine WBC (Auto)     (Absent)  


 


Urine RBC (Auto)     (Absent)  


 


Urine Bacteria     (Absent)  


 


Urine Glucose     (Negative)  


 


Digoxin     (0.8-2.0)  ng/ml














  12/09/17 Range/Units





  14:00 


 


WBC   (3.5-10.8)  10^3/ul


 


RBC   (4.0-5.4)  10^6/ul


 


Hgb   (12.0-16.0)  g/dl


 


Hct   (35-47)  %


 


MCV   (80-97)  fL


 


MCH   (27-31)  pg


 


MCHC   (31-36)  g/dl


 


RDW   (10.5-15)  %


 


Plt Count   (150-450)  10^3/ul


 


MPV   (7.4-10.4)  um3


 


Immature Gran % (Auto)   (0-9)  %


 


Absolute Neuts (auto)   (1.5-7.7)  10^3/ul


 


Absolute Lymphs (auto)   (1.0-4.8)  10^3/ul


 


Absolute Monos (auto)   (0-0.8)  10^3/ul


 


Absolute Eos (auto)   (0-0.6)  10^3/ul


 


Absolute Basos (auto)   (0-0.2)  10^3/ul


 


Absolute Nucleated RBC   


 


Neutrophils %   (38-83)  %


 


Band Neutrophils %   (0-8)  %


 


Normal RBC Morphology   


 


Hypochromasia   


 


Hem Pathologist Commnt   


 


APTT   (26.0-36.3)  seconds


 


Sodium   (133-145)  mmol/L


 


Potassium   (3.5-5.0)  mmol/L


 


Chloride   (101-111)  mmol/L


 


Carbon Dioxide   (22-32)  mmol/L


 


Anion Gap   (2-11)  mmol/L


 


BUN   (6-24)  mg/dL


 


Creatinine   (0.51-0.95)  mg/dL


 


Est GFR ( Amer)   (>60)  


 


Est GFR (Non-Af Amer)   (>60)  


 


BUN/Creatinine Ratio   (8-20)  


 


Glucose   ()  mg/dL


 


Lactic Acid   (0.5-2.0)  mmol/L


 


Calcium   (8.6-10.3)  mg/dL


 


Magnesium   (1.9-2.7)  mg/dL


 


Total Bilirubin   (0.2-1.0)  mg/dL


 


AST   (13-39)  U/L


 


ALT   (7-52)  U/L


 


Alkaline Phosphatase   ()  U/L


 


Total Creatine Kinase   ()  U/L


 


Troponin I  0.07 H*  (<0.04)  ng/mL


 


B-Natriuretic Peptide  ( - 100) pg/mL


 


Total Protein   (6.4-8.9)  g/dL


 


Albumin   (3.2-5.2)  g/dL


 


Globulin   (2-4)  g/dL


 


Albumin/Globulin Ratio   (1-3)  


 


TSH   (0.34-5.60)  mcIU/mL


 


Free T4   (0.61-1.12)  ng/dL


 


Free T3   (2.5-3.9)  pg/mL


 


Urine Color   


 


Urine Appearance   


 


Urine pH   (5-9)  


 


Ur Specific Gravity   (1.010-1.030)  


 


Urine Protein   (Negative)  


 


Urine Ketones   (Negative)  


 


Urine Blood   (Negative)  


 


Urine Nitrate   (Negative)  


 


Urine Bilirubin   (Negative)  


 


Urine Urobilinogen   (Negative)  


 


Ur Leukocyte Esterase   (Negative)  


 


Urine WBC (Auto)   (Absent)  


 


Urine RBC (Auto)   (Absent)  


 


Urine Bacteria   (Absent)  


 


Urine Glucose   (Negative)  


 


Digoxin   (0.8-2.0)  ng/ml











Result Diagrams: 


 12/09/17 10:15





 12/09/17 10:15


Lab Statement: Any lab studies that have been ordered have been reviewed, and 

results considered in the medical decision making process.





- Radiology


  ** CXR


Radiology Interpretation Completed By: Radiologist - Persistent moderately 

large dependent LEFT pleural effusion with proportional atelectasis. No 

compelling evidence for pulmonary edema. ED physician has reviewed this 

radiology report and agrees.





- EKG


  ** 1017


Cardiac Rate: NL


EKG Rhythm: Sinus Rhythm - 71 BPM


EKG Interpretation: No ST elevation


EKG Comparison: No Significant Change - 10/30/17





Course/Dx





- Course


Assessment/Plan: This patient is a 77 year old F BIBA to [facility] with a chief 

complaint of palpitations since earlier today, soon after taking the final 

prednisone tablet in a course. Patient reports feeling herself going into A-

Fib. She took an extra dose of Digoxin PTA. The patient rates the pain 0/10 in 

severity. Symptoms alleviated by nothing. An EKG reveals Sinus Rhythm at 71 BPM 

with no ST elevation and unchanged from 10/30/17. CXR reveals, per radiologist, 

Persistent moderately large dependent LEFT pleural effusion with proportional 

atelectasis. No compelling evidence for pulmonary edema. ED physician has 

reviewed this radiology report and agrees. Test results reveal WBC of 18.2, 

chronic anemia, potassium of 3.4 for which she was given potassium chloride, 

Troponin of 0.07, and BNP of 524. Urinalysis negative for UTI. In the ED course 

the patient has remained stable. Consulted Dr. Hendrickson (hospitalist) at 1124 who 

will see the patient in the ED. Consulted Dr. Hernandez (cardiologist) at 1127 

regarding the patients case. After Dr. Hendricksons assessment, she decided to 

discharge the patient to home with PCP and cardiologist follow up. I discussed 

the plan, as per Dr. Hendrickson, with the patient and she is agreeable with this 

plan. The patient is hemodynamically stable, alert and oriented x3.





- Diagnoses


Differential Diagnosis/HQI/PQRI: Positive: AV Block, Hypokalemia, 

Hyperventilation, Paroxymal SVT, V-Tach


Provider Diagnoses: 


 Hypokalemia, Palpitation








- Physician Notifications


Discussed Care Of Patient With: Patito Hendrickson


Time Discussed With Above Provider: 11:24


Instructed by Provider To: Other - Consulted Dr. Hendrickson (hospitalist) at 1124 who 

will see the patient in the ED. Consulted Dr. Hernandez (cardiologist) at 1127 

regarding the patients case.





Discharge





- Discharge Plan


Condition: Stable


Disposition: HOME


Prescriptions: 


Potassium Chloride Microencaps [Klor-Con M20] 20 meq PO BID #10 tab


Patient Education Materials:  Palpitations (ED), Hypokalemia (ED)


Referrals: 


Prachi Hansen MD [Primary Care Provider] - 3 Days


Elpidio Hernandez MD [Medical Doctor] - 3 Days


Additional Instructions: 


RETURN TO THE EMERGENCY DEPARTMENT FOR CHANGING OR WORSENING SYMPTOMS.





The documentation as recorded by the Zoran dong Alfonso accurately reflects 

the service I personally performed and the decisions made by me, Amos Garland MD.

## 2017-12-12 ENCOUNTER — HOSPITAL ENCOUNTER (INPATIENT)
Dept: HOSPITAL 25 - ED | Age: 77
LOS: 6 days | Discharge: HOME | DRG: 809 | End: 2017-12-18
Attending: HOSPITALIST | Admitting: HOSPITALIST
Payer: MEDICARE

## 2017-12-12 DIAGNOSIS — R50.81: ICD-10-CM

## 2017-12-12 DIAGNOSIS — A60.00: ICD-10-CM

## 2017-12-12 DIAGNOSIS — Z88.8: ICD-10-CM

## 2017-12-12 DIAGNOSIS — I48.91: ICD-10-CM

## 2017-12-12 DIAGNOSIS — E74.39: ICD-10-CM

## 2017-12-12 DIAGNOSIS — Z79.899: ICD-10-CM

## 2017-12-12 DIAGNOSIS — D70.9: Primary | ICD-10-CM

## 2017-12-12 DIAGNOSIS — K44.9: ICD-10-CM

## 2017-12-12 DIAGNOSIS — Z91.048: ICD-10-CM

## 2017-12-12 DIAGNOSIS — Z91.040: ICD-10-CM

## 2017-12-12 DIAGNOSIS — L73.9: ICD-10-CM

## 2017-12-12 DIAGNOSIS — Z88.2: ICD-10-CM

## 2017-12-12 DIAGNOSIS — E83.42: ICD-10-CM

## 2017-12-12 DIAGNOSIS — C82.90: ICD-10-CM

## 2017-12-12 DIAGNOSIS — D69.6: ICD-10-CM

## 2017-12-12 LAB
ADD DIFF/SLIDE REVIEW?: (no result)
ADD PATH REVIEW?: YES
ALBUMIN SERPL BCG-MCNC: 3.5 G/DL (ref 3.2–5.2)
ALP SERPL-CCNC: 57 U/L (ref 34–104)
ALT SERPL W P-5'-P-CCNC: 10 U/L (ref 7–52)
ANION GAP SERPL CALC-SCNC: 5 MMOL/L (ref 2–11)
AST SERPL-CCNC: 11 U/L (ref 13–39)
BUN SERPL-MCNC: 21 MG/DL (ref 6–24)
BUN/CREAT SERPL: 25.3 (ref 8–20)
CALCIUM SERPL-MCNC: 8.8 MG/DL (ref 8.6–10.3)
CHLORIDE SERPL-SCNC: 102 MMOL/L (ref 101–111)
DIGOXIN SERPL-MCNC: 0.7 NG/ML (ref 0.8–2)
GLOBULIN SER CALC-MCNC: 2 G/DL (ref 2–4)
GLUCOSE SERPL-MCNC: 88 MG/DL (ref 70–100)
HCO3 SERPL-SCNC: 28 MMOL/L (ref 22–32)
HCT VFR BLD AUTO: 26 % (ref 35–47)
HGB BLD-MCNC: 8.9 G/DL (ref 12–16)
HYPOCHROMIA BLD QL: (no result)
MAGNESIUM SERPL-MCNC: 1.7 MG/DL (ref 1.9–2.7)
MCH RBC QN AUTO: 31 PG (ref 27–31)
MCHC RBC AUTO-ENTMCNC: 34 G/DL (ref 31–36)
MCV RBC AUTO: 92 FL (ref 80–97)
POTASSIUM SERPL-SCNC: 3.7 MMOL/L (ref 3.5–5)
PROT SERPL-MCNC: 5.5 G/DL (ref 6.4–8.9)
RBC # BLD AUTO: 2.84 10^6/UL (ref 4–5.4)
SODIUM SERPL-SCNC: 135 MMOL/L (ref 133–145)
TROPONIN I SERPL-MCNC: 0.07 NG/ML (ref ?–0.04)
VARIANT LYMPHS # BLD MANUAL: 9 % (ref 0–6)
WBC # BLD AUTO: 0.3 10^3/UL (ref 3.5–10.8)

## 2017-12-12 PROCEDURE — 80162 ASSAY OF DIGOXIN TOTAL: CPT

## 2017-12-12 PROCEDURE — 87040 BLOOD CULTURE FOR BACTERIA: CPT

## 2017-12-12 PROCEDURE — 93005 ELECTROCARDIOGRAM TRACING: CPT

## 2017-12-12 PROCEDURE — 85025 COMPLETE CBC W/AUTO DIFF WBC: CPT

## 2017-12-12 PROCEDURE — 83735 ASSAY OF MAGNESIUM: CPT

## 2017-12-12 PROCEDURE — 87529 HSV DNA AMP PROBE: CPT

## 2017-12-12 PROCEDURE — 71010: CPT

## 2017-12-12 PROCEDURE — 36415 COLL VENOUS BLD VENIPUNCTURE: CPT

## 2017-12-12 PROCEDURE — 85730 THROMBOPLASTIN TIME PARTIAL: CPT

## 2017-12-12 PROCEDURE — 80048 BASIC METABOLIC PNL TOTAL CA: CPT

## 2017-12-12 PROCEDURE — 85060 BLOOD SMEAR INTERPRETATION: CPT

## 2017-12-12 PROCEDURE — 85610 PROTHROMBIN TIME: CPT

## 2017-12-12 PROCEDURE — 0H97XZZ DRAINAGE OF ABDOMEN SKIN, EXTERNAL APPROACH: ICD-10-PCS | Performed by: EMERGENCY MEDICINE

## 2017-12-12 PROCEDURE — 0H98XZZ DRAINAGE OF BUTTOCK SKIN, EXTERNAL APPROACH: ICD-10-PCS | Performed by: EMERGENCY MEDICINE

## 2017-12-12 PROCEDURE — 83605 ASSAY OF LACTIC ACID: CPT

## 2017-12-12 PROCEDURE — 84484 ASSAY OF TROPONIN QUANT: CPT

## 2017-12-12 PROCEDURE — 80053 COMPREHEN METABOLIC PANEL: CPT

## 2017-12-12 PROCEDURE — 83615 LACTATE (LD) (LDH) ENZYME: CPT

## 2017-12-12 PROCEDURE — 81015 MICROSCOPIC EXAM OF URINE: CPT

## 2017-12-12 PROCEDURE — 81003 URINALYSIS AUTO W/O SCOPE: CPT

## 2017-12-12 PROCEDURE — 87798 DETECT AGENT NOS DNA AMP: CPT

## 2017-12-12 PROCEDURE — 84550 ASSAY OF BLOOD/URIC ACID: CPT

## 2017-12-12 RX ADMIN — SODIUM CHLORIDE SCH MLS/HR: 900 IRRIGANT IRRIGATION at 22:26

## 2017-12-12 RX ADMIN — DIGOXIN SCH MG: 125 TABLET ORAL at 22:31

## 2017-12-12 RX ADMIN — POTASSIUM CHLORIDE SCH MEQ: 1500 TABLET, EXTENDED RELEASE ORAL at 22:42

## 2017-12-12 RX ADMIN — CEFAZOLIN SODIUM SCH MLS/HR: 1 SOLUTION INTRAVENOUS at 22:26

## 2017-12-12 NOTE — RAD
HISTORY: Hypertension



COMPARISONS: December 09, 2017



VIEWS: 1: frontal portable view of the chest at 1:55 PM



FINDINGS:

LINES AND TUBES: A right-sided chest port is noted with the tip overlying the superior

vena cava.

CARDIOMEDIASTINAL SILHOUETTE: The cardiomediastinal silhouette is normal for portable

technique.

PLEURA: There is a small left pleural effusion.

LUNG PARENCHYMA: There is patchy alveolar opacification of the left lung base

ABDOMEN: The upper abdomen is clear. There is no subphrenic gas.

BONES AND SOFT TISSUES: No bone or soft tissue abnormalities are noted.



IMPRESSION: SMALL LEFT PLEURAL EFFUSION WITH LEFT BASILAR ATELECTASIS VERSUS CONSOLIDATION

## 2017-12-12 NOTE — ED
Nicci VASQUEZ Nilda, scribed for Barber Odom MD on 12/12/17 at 1531 .





Complex/Multi-Sys Presentation





- HPI Summary


HPI Summary: 


This patient is a 77 year old F BIBA to [facility] with a chief complaint of 

constant dizziness and lightheadedness for the past few days. Last week pt had 

chemo therapy. Pt states she has been abnormally hypotensive for the past 

couple days with the following log of blood pressures:


12/12/17: 95/60 101bpm, 81/56 100bpm, 64/43 96bpm, 71/51 95bpm.


12/11/17: 103/68 95bpm, 104/65 103bpm.


12/10/17: 108/62 84bpm, 126/74 75bpm.


Patient reports near syncope and painful sores on her bottom. Pt states she 

does not move around much. Patient denies CP, chest heaviness, headache, recent 

falls, fever, chills, diaphoresis, and SOB. Symptoms alleviated by nothing. Pt 

states she was recently prescribed antibiotics to assist her immune system but 

has not taken any yet. She also states she just finished a course of prednisone

, which she believes may have contributed to her A-fib from her last hospital 

visit. PMHx lymphoma.





- History Of Current Complaint


Chief Complaint: EDGeneral


Time Seen by Provider: 12/12/17 13:40


Hx Obtained From: Patient


Onset/Duration: Sudden Onset, Lasting Days, Still Present


Timing: Constant


Alleviating Factor(s): nothing


Associated Signs And Symptoms: Positive: Other - dizziness, lightheadedness, 

hypotensive, near syncope, painful sores on bottom.


Related History: Other - Chemo therapy for Lymphoma





- Allergies/Home Medications


Allergies/Adverse Reactions: 


 Allergies











Allergy/AdvReac Type Severity Reaction Status Date / Time


 


Sulfa Antibiotics AdvReac  See Comment Verified 08/09/17 11:37


 


"some chemo drugs" Allergy  Unknown Uncoded 12/09/17 10:00





   Reaction  





   Details  











Home Medications: 


 Home Medications





Acetaminophen TAB* [Tylenol TAB*] 650 mg PO Q4H PRN 12/12/17 [History Confirmed 

12/12/17]


Cephalexin CAP* [Keflex CAP*] 500 mg PO BID 12/12/17 [History Confirmed 12/12/17

]


Cyanocobalamin [Vitamin B-12] 1,000 mcg PO DAILY 12/12/17 [History Confirmed 12/ 12/17]


Digoxin TAB* [Lanoxin TAB*] 0.125 mg PO SUTUTHSA 12/12/17 [History Confirmed 12/ 12/17]


Digoxin TAB* [Lanoxin TAB*] 0.25 mg PO MOWEFR 12/12/17 [History Confirmed 12/12/ 17]


Levofloxacin TAB* [Levaquin TAB*] 500 mg PO DAILY 12/12/17 [History Confirmed 12 /12/17]


Multivitamins/Minerals TAB* [Theragran/minerals TAB*] 1 tab PO DAILY 12/12/17 [

History Confirmed 12/12/17]


Potassium Chlor TAB* [Klor Con ER TAB*] 20 meq PO BID 12/12/17 [History 

Confirmed 12/12/17]











PMH/Surg Hx/FS Hx/Imm Hx


Endocrine/Hematology History: 


   Denies: Hx Diabetes


Cardiovascular History: Reports: Hx Atrial Fibrillation, Hx Hypercholesterolemia

, Other Cardiovascular Problems/Disorders - moderate atheroschlerosis


   Denies: Hx Hypertension, Hx Pacemaker/ICD


GI History: Reports: Hx Gastrointestinal Bleed, Other GI Disorders - abdominal 

mass, ascites


 History: Reports: Other  Problems/Disorders - UTI's


   Denies: Hx Renal Disease


Sensory History: Reports: Hx Contacts or Glasses


   Denies: Hx Deafness, Hx Hearing Aid


Opthamlomology History: Reports: Hx Contacts or Glasses


Neurological History: 


   Comment Only: Other Neuro Impairments/Disorders - mild Bell's Palsy 9/16


Psychiatric History: 


   Denies: Hx Panic Disorder





- Cancer History


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  FOLLICULAR 

LYMPHOMA


Hx Chemotherapy: No





- Surgical History


Surgery Procedure, Year, and Place: appy as "a teenager"


Infectious Disease History: No


Infectious Disease History: 


   Denies: Hx Clostridium Difficile, Hx Hepatitis, Hx Human Immunodeficiency 

Virus (HIV), Hx of Known/Suspected MRSA, Hx Shingles, Hx Tuberculosis, Hx Known/

Suspected VRE, Hx Known/Suspected VRSA, History Other Infectious Disease, 

Traveled Outside the [place] in Last 30 Days





- Family History


Known Family History: 


   Negative: Cardiac Disease - denies family hx of, Diabetes





- Social History


Alcohol Use: Rare


Hx Substance Use: Yes


Substance Use Type: Reports: Marijuana


Hx Tobacco Use: No


Smoking Status (MU): Never Smoked Tobacco





Review of Systems


Negative: Fever, Chills, Skin Diaphoresis


Negative: Erythema


Negative: Sore Throat


Positive: Other - hypotensive; negative chest heaviness.  Negative: Chest Pain


Negative: Shortness Of Breath, Cough


Negative: Abdominal Pain, Nausea


Negative: dysuria, hematuria


Negative: Myalgia, Edema


Negative: Rash


Neurological: Other - near syncope, lightheadedness, dizziness; negative recent 

falls


Negative: Headache


All Other Systems Reviewed And Are Negative: Yes





Physical Exam





- Summary


Physical Exam Summary: 


Constitutional: Well-developed, Well-nourished, Alert. (-) Distressed


Skin: Warm, Dry, Pale


HENT: Normocephalic; Atraumatic; Dry Mucous Membranes


Eyes: Conjunctiva normal


Neck: Musculoskeletal ROM normal neck. (-) JVD, (-) Stridor, (-) Tracheal 

deviation


Cardio: Rhythm regular, rate normal, Heart sounds normal; Intact distal pulses; 

The pedal pulses are 2+ and symmetric. Radial pulses are 2+ and symmetric. (-) 

Murmur


Pulmonary/Chest wall: Effort normal. (-) Respiratory distress, (-) Wheezes, (-) 

Rales


Abd: Soft, (-) Tenderness, (-) Distension, (-) Guarding, (-) Rebound


Musculoskeletal: (-) Edema


Lymph: (-) Cervical adenopathy


Neuro: Alert, Oriented x3


Psych: Mood and affect Normal


Triage Information Reviewed: Yes


Vital Signs On Initial Exam: 


 Initial Vitals











Temp Pulse Resp BP Pulse Ox


 


 97.8 F   102   14   70/53   100 


 


 12/12/17 12:43  12/12/17 12:43  12/12/17 12:43  12/12/17 12:43  12/12/17 12:43











Vital Signs Reviewed: Yes





Procedures





- Procedure Summary


Procedure Summary: 


2 separate procedures for Inscision and Drainage:





First I&D: Had pustule with induration approx. 4mm on right buttock, purulent 

drainage, used 2ml of 1% Lido, both needle drainage and 11-blade scalpel.





Second I&D: 2 cm bellow symphysis pubis, 3 mm pustule w/o induration. No 

significant drainaige. 11-blade superficially. Lanced with 25 gauge needle. 1 

ml of 1% lido used.





- Incision and Drainage


Site: Right buttock


Anesthesia: Lidocaine - 2 mL 1%


Instrument(s): Scalpel - 11-blade, Needle





Diagnostics





- Vital Signs


 Vital Signs











  Temp Pulse Resp BP Pulse Ox


 


 12/12/17 12:43  97.8 F  102  14  70/53  100














- Laboratory


Lab Results: 


 Lab Results











  12/12/17 12/12/17 12/12/17 Range/Units





  12:12 14:20 14:20 


 


WBC    0.3 L  (3.5-10.8)  10^3/ul


 


RBC    2.84 L  (4.0-5.4)  10^6/ul


 


Hgb    8.9 L  (12.0-16.0)  g/dl


 


Hct    26 L  (35-47)  %


 


MCV    92  (80-97)  fL


 


MCH    31  (27-31)  pg


 


MCHC    34  (31-36)  g/dl


 


RDW    14  (10.5-15)  %


 


Plt Count    29 L  (150-450)  10^3/ul


 


MPV    8  (7.4-10.4)  um3


 


Neut % (Auto)    Pending  


 


Lymph % (Auto)    Pending  


 


Mono % (Auto)    Pending  


 


Eos % (Auto)    Pending  


 


Baso % (Auto)    Pending  


 


Absolute Neuts (auto)    Pending  


 


Absolute Lymphs (auto)    Pending  


 


Absolute Monos (auto)    Pending  


 


Absolute Eos (auto)    Pending  


 


Absolute Basos (auto)    Pending  


 


Absolute Nucleated RBC    Pending  


 


Nucleated RBC %    Pending  


 


INR (Anticoag Therapy)   0.99   (0.77-1.02)  


 


APTT   33.1   (26.0-36.3)  seconds


 


Sodium     (133-145)  mmol/L


 


Potassium     (3.5-5.0)  mmol/L


 


Chloride     (101-111)  mmol/L


 


Carbon Dioxide     (22-32)  mmol/L


 


Anion Gap     (2-11)  mmol/L


 


BUN     (6-24)  mg/dL


 


Creatinine     (0.51-0.95)  mg/dL


 


Est GFR ( Amer)     (>60)  


 


Est GFR (Non-Af Amer)     (>60)  


 


BUN/Creatinine Ratio     (8-20)  


 


Glucose     ()  mg/dL


 


Lactic Acid     (0.5-2.0)  mmol/L


 


Calcium     (8.6-10.3)  mg/dL


 


Total Bilirubin     (0.2-1.0)  mg/dL


 


AST     (13-39)  U/L


 


ALT     (7-52)  U/L


 


Alkaline Phosphatase     ()  U/L


 


Troponin I     (<0.04)  ng/mL


 


Total Protein     (6.4-8.9)  g/dL


 


Albumin     (3.2-5.2)  g/dL


 


Globulin     (2-4)  g/dL


 


Albumin/Globulin Ratio     (1-3)  


 


Urine Color  Yellow    


 


Urine Appearance  Clear    


 


Urine pH  7.0    (5-9)  


 


Ur Specific Gravity  1.003 L    (1.010-1.030)  


 


Urine Protein  Negative    (Negative)  


 


Urine Ketones  Negative    (Negative)  


 


Urine Blood  1+ H    (Negative)  


 


Urine Nitrate  Negative    (Negative)  


 


Urine Bilirubin  Negative    (Negative)  


 


Urine Urobilinogen  Negative    (Negative)  


 


Ur Leukocyte Esterase  Negative    (Negative)  


 


Urine WBC (Auto)  Absent    (Absent)  


 


Urine RBC (Auto)  Trace(0-2/hpf)    (Absent)  


 


Urine Bacteria  Absent    (Absent)  


 


Urine Glucose  Negative    (Negative)  














  12/12/17 12/12/17 Range/Units





  14:20 14:20 


 


WBC    (3.5-10.8)  10^3/ul


 


RBC    (4.0-5.4)  10^6/ul


 


Hgb    (12.0-16.0)  g/dl


 


Hct    (35-47)  %


 


MCV    (80-97)  fL


 


MCH    (27-31)  pg


 


MCHC    (31-36)  g/dl


 


RDW    (10.5-15)  %


 


Plt Count    (150-450)  10^3/ul


 


MPV    (7.4-10.4)  um3


 


Neut % (Auto)    


 


Lymph % (Auto)    


 


Mono % (Auto)    


 


Eos % (Auto)    


 


Baso % (Auto)    


 


Absolute Neuts (auto)    


 


Absolute Lymphs (auto)    


 


Absolute Monos (auto)    


 


Absolute Eos (auto)    


 


Absolute Basos (auto)    


 


Absolute Nucleated RBC    


 


Nucleated RBC %    


 


INR (Anticoag Therapy)    (0.77-1.02)  


 


APTT    (26.0-36.3)  seconds


 


Sodium  135   (133-145)  mmol/L


 


Potassium  3.7   (3.5-5.0)  mmol/L


 


Chloride  102   (101-111)  mmol/L


 


Carbon Dioxide  28   (22-32)  mmol/L


 


Anion Gap  5   (2-11)  mmol/L


 


BUN  21   (6-24)  mg/dL


 


Creatinine  0.83   (0.51-0.95)  mg/dL


 


Est GFR ( Amer)  86.2   (>60)  


 


Est GFR (Non-Af Amer)  67.0   (>60)  


 


BUN/Creatinine Ratio  25.3 H   (8-20)  


 


Glucose  88   ()  mg/dL


 


Lactic Acid   1.0  (0.5-2.0)  mmol/L


 


Calcium  8.8   (8.6-10.3)  mg/dL


 


Total Bilirubin  0.60   (0.2-1.0)  mg/dL


 


AST  11 L   (13-39)  U/L


 


ALT  10   (7-52)  U/L


 


Alkaline Phosphatase  57   ()  U/L


 


Troponin I  0.07 H*   (<0.04)  ng/mL


 


Total Protein  5.5 L   (6.4-8.9)  g/dL


 


Albumin  3.5   (3.2-5.2)  g/dL


 


Globulin  2.0   (2-4)  g/dL


 


Albumin/Globulin Ratio  1.8   (1-3)  


 


Urine Color    


 


Urine Appearance    


 


Urine pH    (5-9)  


 


Ur Specific Gravity    (1.010-1.030)  


 


Urine Protein    (Negative)  


 


Urine Ketones    (Negative)  


 


Urine Blood    (Negative)  


 


Urine Nitrate    (Negative)  


 


Urine Bilirubin    (Negative)  


 


Urine Urobilinogen    (Negative)  


 


Ur Leukocyte Esterase    (Negative)  


 


Urine WBC (Auto)    (Absent)  


 


Urine RBC (Auto)    (Absent)  


 


Urine Bacteria    (Absent)  


 


Urine Glucose    (Negative)  











Result Diagrams: 


 12/12/17 14:20





 12/12/17 14:20


Lab Statement: Any lab studies that have been ordered have been reviewed, and 

results considered in the medical decision making process.





- Radiology


  ** CXR


Radiology Interpretation Completed By: Radiologist - CXR, per radiologist, 

reveals small left pleural effusion with left basilar a telectasis versus 

consolidation. Dr. Odom has reviewed this radiology report.





- EKG


  ** 1400


Cardiac Rate: Other Rate


EKG Rhythm: Sinus Rhythm - 86bpm


EKG Interpretation:  paced, no STEMI. 





Complex Multi-Symp Course/Dx


Assessment/Plan: This patient is a 77 year old F BIBA to [facility] with a chief 

complaint of constant dizziness and lightheadedness for the past few days. Last 

week pt had chemo therapy. Pt states she has been abnormally hypotensive for 

the past couple days with the following log of blood pressures:  12/12/17: 95/

60 101bpm, 81/56 100bpm, 64/43 96bpm, 71/51 95bpm.  12/11/17: 103/68 95bpm, 104/

65 103bpm.  12/10/17: 108/62 84bpm, 126/74 75bpm.  Patient reports near syncope 

and painful sores on her bottocks. Pt states she does not move around much. 

Patient denies CP, chest heaviness, headache, recent falls, fever, chills, 

diaphoresis, and SOB. Symptoms alleviated by nothing. Pt states she was 

recently prescribed antibiotics to assist her immune system but has not taken 

any yet. She also states she just finished a course of prednisone, which she 

believes may have contributed to her A-fib from her last hospital visit. PMHx 

lymphoma.  Pt requested we utilize her peripheral veins rather than her port.  

Pending labs, EKG and CXR.  Blood work is without significant abnormalities 

except trop 0.07, Neuts 0.1.  An EKG reveals 86bpm, paced, no STEMI.  CXR, per 

radiologist, reveals small left pleural effusion with left basilar a telectasis 

versus consolidation. Dr. Odom has reviewed this radiology report.  1713 Dr Salvador (Oncologist) recommends admission.  1725 Dr Rod (Hospitalist) agrees to 

admit pt.  Pt is stable and will be admitted with Dx of neutropenia, pustules, 

sepsis, and hypotension. Pt understands and is agreeable with this plan.  90 

mins CCT





- Diagnoses


Provider Diagnoses: 


 Neutropenia, Pustule, Sepsis, Hypotension








- Physician Notifications


Discussed Care Of Patient With: Elver Rod - Hospitalist


Time Discussed With Above Provider: 17:25


Instructed by Provider To: Admit As Inpatient





- Critical Care Time


Critical Care Time:  min - 90 mins





Discharge





- Discharge Plan


Condition: Guarded


Disposition: ADMITTED TO [facility] documentation as recorded by the Nicci dong Nilda accurately 

reflects the service I personally performed and the decisions made by me, Barber Odom MD.

## 2017-12-13 LAB
ADD DIFF/SLIDE REVIEW?: (no result)
ANION GAP SERPL CALC-SCNC: 4 MMOL/L (ref 2–11)
BUN SERPL-MCNC: 16 MG/DL (ref 6–24)
BUN/CREAT SERPL: 22.2 (ref 8–20)
CALCIUM SERPL-MCNC: 8.9 MG/DL (ref 8.6–10.3)
CHLORIDE SERPL-SCNC: 107 MMOL/L (ref 101–111)
GLUCOSE SERPL-MCNC: 96 MG/DL (ref 70–100)
HCO3 SERPL-SCNC: 26 MMOL/L (ref 22–32)
HCT VFR BLD AUTO: 24 % (ref 35–47)
HGB BLD-MCNC: 8.3 G/DL (ref 12–16)
MAGNESIUM SERPL-MCNC: 2 MG/DL (ref 1.9–2.7)
MCH RBC QN AUTO: 32 PG (ref 27–31)
MCHC RBC AUTO-ENTMCNC: 35 G/DL (ref 31–36)
MCV RBC AUTO: 92 FL (ref 80–97)
POTASSIUM SERPL-SCNC: 4.1 MMOL/L (ref 3.5–5)
RBC # BLD AUTO: 2.6 10^6/UL (ref 4–5.4)
SODIUM SERPL-SCNC: 137 MMOL/L (ref 133–145)
WBC # BLD AUTO: 0.3 10^3/UL (ref 3.5–10.8)

## 2017-12-13 RX ADMIN — THERA TABS SCH TAB: TAB at 10:21

## 2017-12-13 RX ADMIN — SODIUM CHLORIDE SCH MLS/HR: 900 IRRIGANT IRRIGATION at 10:19

## 2017-12-13 RX ADMIN — POTASSIUM CHLORIDE SCH MEQ: 1500 TABLET, EXTENDED RELEASE ORAL at 20:18

## 2017-12-13 RX ADMIN — POTASSIUM CHLORIDE SCH MEQ: 1500 TABLET, EXTENDED RELEASE ORAL at 10:21

## 2017-12-13 RX ADMIN — CEFAZOLIN SODIUM SCH MLS/HR: 1 SOLUTION INTRAVENOUS at 05:55

## 2017-12-13 RX ADMIN — DIGOXIN SCH MG: 125 TABLET ORAL at 17:54

## 2017-12-13 RX ADMIN — CEFEPIME HYDROCHLORIDE SCH MLS/HR: 2 INJECTION, SOLUTION INTRAVENOUS at 10:36

## 2017-12-13 RX ADMIN — CYANOCOBALAMIN TAB 500 MCG SCH MCG: 500 TAB at 10:20

## 2017-12-13 RX ADMIN — CEFEPIME HYDROCHLORIDE SCH MLS/HR: 2 INJECTION, SOLUTION INTRAVENOUS at 17:52

## 2017-12-13 NOTE — PN
Subjective


Date of Service: 12/13/17


Interval History: 


HOSPITALIST PROGRESS NOTE


Patient seen and examined at bedside.


She feels better today. Areas of folliculitis are not painful or swollen. Still 

feels weak, but overall feels she's improving.


Family History: Unchanged from Admission


Social History: Unchanged from Admission


Past Medical History: Unchanged from Admission





Objective


Active Medications: 








Acetaminophen (Tylenol Tab*)  650 mg PO Q4H PRN


   PRN Reason: PAIN


Cyanocobalamin (Vitamin B12 Tab*)  1,000 mcg PO DAILY [facility]


   Last Admin: 12/13/17 10:20 Dose:  1,000 mcg


Digoxin (Lanoxin Tab*)  0.125 mg PO SuTuThSa@1700 [facility]


   Last Admin: 12/12/17 22:31 Dose:  0.125 mg


Digoxin (Lanoxin Tab*)  0.25 mg PO MoWeFr@1700 [facility]


Sodium Chloride (Ns 0.9% 1000 Ml*)  1,000 mls @ 100 mls/hr IV PER RATE [facility]


   Stop: 12/14/17 06:44


   Last Admin: 12/13/17 10:19 Dose:  100 mls/hr


Cefepime HCl (Maxipime 2 Gm In Dextrose Duplex (*))  2 gm in 50 mls @ 100 mls/

hr IV Q8H [facility]


   Last Admin: 12/13/17 10:36 Dose:  100 mls/hr


Multivitamins/Minerals (Theragran/Minerals Tab*)  1 tab PO DAILY [facility]


   Last Admin: 12/13/17 10:21 Dose:  1 tab


Ondansetron HCl (Zofran Inj*)  4 mg IV Q4H PRN


   PRN Reason: NAUSEA/VOMITING


Potassium Chloride (Klor Con Er Tab*)  20 meq PO BID [facility]


   Last Admin: 12/13/17 10:21 Dose:  20 meq








Vital Signs - 8 hr











  12/13/17 12/13/17





  07:47 08:00


 


Temperature 98.4 F 


 


Pulse Rate 117 


 


Respiratory 14 14





Rate  


 


Blood Pressure 107/56 





(mmHg)  


 


O2 Sat by Pulse 94 





Oximetry  











Oxygen Devices in Use Now: None


Appearance: Pleasant elderly lady sitting up in bed in NAD.


Eyes: No Scleral Icterus


Ears/Nose/Mouth/Throat: Mucous Membranes Moist


Neck: Trachea Midline


Respiratory: Symmetrical Chest Expansion and Respiratory Effort, Clear to 

Auscultation


Cardiovascular: RRR - Normal S1 and S2


Abdominal: NL Sounds; No Tenderness; No Distention


Skin: - - Small area of folliculitis on right buttock and right labia majora, 

clean, with no drainage or erythema


Neurological: Alert and Oriented x 3, NL Muscle Strength and Tone


Result Diagrams: 


 12/13/17 05:50





 12/13/17 05:50





Assess/Plan/Problems-Billing


Assessment: 


Mrs. Lisa is a 78yo F with PMH of follicular lymphoma s/p chemo (last 

treatment was last week), glucose intolerance, HTN, Afib, who presented to ED 

with c/o fever, weakness, found to have significant neutropenia.





- Patient Problems


(1) Neutropenic fever


SNOMED Code(s): 522304039


   Comment: - Patient remains afebrile here, but had subjective fever at home.


- WBC 0.3 and ANC is 0.


- Continue Cefepime and monitor cell counts.


- D/w Oncology - her pancytopenia should peak 10-14 days after chemo (around now

) and she already received Neulasta, so at this point we just monitor her cell 

counts.


- No signs of mucositis at this time.


- Continue neutropenic precautions.   





(2) Thrombocytopenia


Comment: - Platelets 29k, but no signs of bleeding.


- Continue to monitor.   





(3) Folliculitis


Comment: - Seems to be improving - will continue to monitor.   





(4) Atrial fibrillation


Comment: - Continue digoxin.


- Hypomagnesemia corrected.   





(5) DVT prophylaxis


Comment: - Pharmacological prophylaxis contraindicated in the setting of severe 

thrombocytopenia.


- SCDs.   





(6) Full code status





Status and Disposition: 


Inpatient.

## 2017-12-13 NOTE — HP
CC:  Dr. Hansen; Dr. Garcia at [facility] *

 

HISTORY AND PHYSICAL:

 

DATE OF ADMISSION:  12/12/17

 

PROVIDER:  Noreen Nicolas NP

 

PRIMARY CARE PROVIDER:  Dr. Hansne.

 

ONCOLOGIST:  Dr. Garcia at [facility].

 

ATTENDING PHYSICIAN WHILE IN THE HOSPITAL:  Dr. Erma Montes * (report 
dictated by Noreen Nicolas NP).

 

CHIEF COMPLAINT:  Weakness, low blood pressure.

 

HISTORY OF PRESENT ILLNESS:  Ms. Lisa is a 77-year-old female that presented 
to the emergency room today for weakness.  She has a diagnosis of follicular 
lymphoma as of July of 2017.  She began R-CHOP on 08/21/17.  The patient has 
just completed her 6 rounds of R-CHOP, which she says is her last round.  She 
states that she was admitted to [facility] on December 4th and 5th, which she 
received chemo and then she states she received 4 days of steroid after, which 
was the rest of her therapy. She completed that on December 8th.  The patient 
states over the last 2 to 3 days she has been feeling very tired and weak and 
she states that she does feel faint with standing, but she denies any loss of 
consciousness.  She denies any cough or shortness of breath.  She stated this 
morning that she felt incredibly weak and noted that her blood pressure fell 
low.  She felt that her weakness was due to the decreased intake of fluids over 
the past few days.  She reports that she has had no fever, no chills, no nausea
, no vomiting, no diarrhea.  She does report that she had 2 small swollen areas
, one on her suprapubic area and one on her right inner buttocks that were 
sore.  She states that she was lying up on her side because it was sore to sit 
on her buttocks, those areas were lanced in the ER and cultures were sent.

 

PAST MEDICAL HISTORY:

1.  Follicular lymphoma.

2.  Lyme's disease in fall of 2016.

3.  History of prediabetes.

4.  History of hypertension, off antihypertensive since March of 2017.

5.  Hiatal hernia.

6.  Atrial fibrillation.

 

PAST SURGICAL HISTORY:

1.  D and Cs.

2.  Appendectomy.

3.  Tonsillectomy.

4.  Retroperitoneal mass biopsy.

 

MEDICATIONS:

1.  Levaquin 500 mg daily, which she has not started yet.

2.  Digoxin 0.125 mg alternating with 0.25 mg p.o. daily.

3.  Multivitamin.

4.  Vitamin B12 1000 mcg p.o. daily.

 

ALLERGIES:  She reports some allergy to SULFA DRUG, LATEX.  She is sensitive to 
tape, Rituxan, and diphenhydramine.

 

FAMILY HISTORY:  Mother is living at the age of 95.  She has a history of CVA. 
Dad's history is unknown as the patient is adopted.

 

SOCIAL HISTORY:  The patient is a lifelong nonsmoker.  She does drink alcohol 
occasionally.  She is still working.  She runs an environmental microbiology 
lab. She is .  She has 4 children.  She indicates that her son, Richmond Lisa is her healthcare proxy and his phone number is [phone].

 

REVIEW OF SYSTEMS:  The patient denies any fever, chills, or vomiting.  She 
admits to having mild nausea.  Cardiac:  She denies chest pain.  She denies any 
edema. Respiratory:  No cough.  No hemoptysis.  She does state that she did 
have an episode of shortness of breath when she felt faint earlier today.  GI:  
No nausea, no vomiting, no diarrhea, no abdominal pain.  :  No gross 
hematuria.  No dysuria. Neuro:  No focal weakness or sensory loss.  Eyes:  No 
visual complaints.  ENT:  She does report that she has occasional difficulty 
swallowing large pills. Musculoskeletal:  Denies any aches or pains.  Skin:  
She does state that she has a small lesion in the suprapubic area in right 
inner buttocks.  Those lesions were lanced in the emergency room today.  
Psychiatric:  She denies any depression or anxiety.

 

                               PHYSICAL EXAMINATION

 

GENERAL:  The patient is well-developed, elderly female, lying upright on the 
stretcher.  She is in no acute distress.

 

VITAL SIGNS:  Blood pressure 97/51, heart rate is 100, respirations are 16, O2 
saturation is 96% on room air.  She was hypotensive in the 70s.  She was 70/53 
on arrival to the ER today.  Her temperature was 97.8 on arrival to the ER.

 

HEENT:  The patient is bald.  Head is normocephalic, atraumatic.  Pupils are 
equal and reactive to light.  Extraocular eye movements are intact.  Oropharynx 
is clear. Oral mucosa is mildly dry.

 

PULMONARY:  Lung sounds are clear throughout with diminished sounds in 
bilateral bases.  There are no rales or rhonchi.

 

CARDIAC:  Normal S1, S2.  Rate is regular rhythm at this time.  There is no 
lower extremity edema.

 

ABDOMEN:  Flat and soft.  Bowel sounds are present x4.  It is nontender.

 

MUSCULOSKELETAL:  There is no cyanosis or clubbing of the digits.  There is 
full range of motion with all four extremities.

 

NEUROLOGIC:  Cranial nerves II through XII are grossly intact.  Strength is 5/5
, symmetric in both upper and lower extremities bilaterally.

 

PSYCH:  The patient is alert.  She is oriented x3.  She appears to be 
appropriate.

 

SKIN:  Warm and dry.  There is a small lesion that is white in the suprapubic 
area with a small lanced incision in the center of the lesion.  There is also a 
lesion on the right inner buttocks the same appearance, mild erythema around 
the edges of the lesion white with a lanced area in the center of the lesion.  
The lesion sizes are approximately half a centimeter in diameter.

 

 DIAGNOSTIC STUDIES AND LABORATORY DATA:  WBCs are 0.3, RBCs are 2.84, 
hemoglobin is 8.9, hematocrit is 26, platelet count is 29, absolute neutrophils 
are 0.1, absolute lymphs are 0.2, neutrophils are 17, lymphocytes are 62, 
reactive lymphs are 9.  INR is 0.99.  Sodium 135, potassium 3.7, chloride 102, 
carbon dioxide is 28, anion gap is 5, BUN is 21, creatinine is 0.83.  Glucose 
is 88, lactic acid is 1.5 at 1740, calcium is 8.8, magnesium is 1.7.  ASTs are 
11, ALTs are 10, troponin is 0.07.  Her troponin on 12/09 was also 0.07 x2.  
Urine specific gravity was 1.003.  Urine protein was negative.  Ketones were 
negative.  Urine blood was +1.  Urine nitrites are negative.  Bilirubin was 
negative.  Urobilinogen is negative.  Urine leukocyte esterase is negative.  
Urine wbc's were absent, rbc's were trace, urine bacteria was absent, and urine 
glucose was also negative.  Digoxin level today was 0.7.

 

EKG, normal sinus rhythm, rate of 86, no ST-T changes noted.  Chest x-ray was 
interpreted by the radiologist, impression was small left pleural effusion with 
left basilar atelectasis versus consolidation.

 

ASSESSMENT AND PLAN:  Mrs. Lisa is a 77-year-old female with a history of 
follicular lymphoma, status post 6 cycles of R-CHOP therapy, who presents to 
the emergency room with complaints of severe weakness and low blood pressure.  
She will be admitted to the hospital for:

 

1.  Neutropenia.  We will repeat her CBC in the morning and she will be placed 
on neutropenic precautions.

2.  Weakness.  We will give her gentle hydration with normal saline.

3.  Anemia.  We will repeat her CBC in the morning and trend her H and H.

4.  Thrombocytopenia.  Her platelet count is 29,000.  We will continue to 
monitor her CBC.  Suspect folliculitis.  We will start her on Ancef 1 g q.8 
hours and monitor the lesions for increased infection.  We will also monitor 
her for a fever.

5.  Hypomagnesium.  She will get magnesium 2 g IV for replacement and we will 
recheck her magnesium level in the a.m.

6.  FEN.  She will be placed on normal saline IV fluids.  She will also get a 
regular diet.

7.  Code status.  She is a full code.

8.  DVT prophylaxis.  She is at high risk for DVT.  We will hold chemical 
prophylaxis at this time due to her platelet count of 29.  I will encourage her 
to ambulate and we will apply TEDs.

 

TIME SPENT:  Time spent on this admission was 60 minutes, greater than half the 
time was spent face-to-face with the patient obtaining history and physical, 
the other half of the time was spent going over her plan of care and 
implementing the plan of care.

 

I have discussed this plan with my attending, Dr. Erma Montes and she is in 
agreement.

 

 ____________________________________ NOREEN NICOLAS, [place]

 

[id number]/CPS #: [id number]

CHANELLE

## 2017-12-14 LAB
ADD DIFF/SLIDE REVIEW?: (no result)
HCT VFR BLD AUTO: 29 % (ref 35–47)
HGB BLD-MCNC: 9.6 G/DL (ref 12–16)
HYPOCHROMIA BLD QL: (no result)
MCH RBC QN AUTO: 31 PG (ref 27–31)
MCHC RBC AUTO-ENTMCNC: 34 G/DL (ref 31–36)
MCV RBC AUTO: 93 FL (ref 80–97)
RBC # BLD AUTO: 3.12 10^6/UL (ref 4–5.4)
VARIANT LYMPHS # BLD MANUAL: 1 % (ref 0–6)
WBC # BLD AUTO: 0.9 10^3/UL (ref 3.5–10.8)

## 2017-12-14 RX ADMIN — THERA TABS SCH TAB: TAB at 08:52

## 2017-12-14 RX ADMIN — POTASSIUM CHLORIDE SCH MEQ: 1500 TABLET, EXTENDED RELEASE ORAL at 08:52

## 2017-12-14 RX ADMIN — POTASSIUM CHLORIDE SCH MEQ: 1500 TABLET, EXTENDED RELEASE ORAL at 20:05

## 2017-12-14 RX ADMIN — CEFEPIME HYDROCHLORIDE SCH MLS/HR: 2 INJECTION, SOLUTION INTRAVENOUS at 08:52

## 2017-12-14 RX ADMIN — CEFEPIME HYDROCHLORIDE SCH MLS/HR: 2 INJECTION, SOLUTION INTRAVENOUS at 01:28

## 2017-12-14 RX ADMIN — VALACYCLOVIR HYDROCHLORIDE SCH GM: 1 TABLET, FILM COATED ORAL at 20:05

## 2017-12-14 RX ADMIN — DIGOXIN SCH MG: 125 TABLET ORAL at 17:40

## 2017-12-14 RX ADMIN — CYANOCOBALAMIN TAB 500 MCG SCH MCG: 500 TAB at 08:52

## 2017-12-14 RX ADMIN — CEFEPIME HYDROCHLORIDE SCH MLS/HR: 2 INJECTION, SOLUTION INTRAVENOUS at 17:39

## 2017-12-14 RX ADMIN — HEPARIN PRN ML: 100 SYRINGE at 18:59

## 2017-12-14 NOTE — PN
Subjective


Date of Service: 12/14/17


Interval History: 


HOSPITALIST PROGRESS NOTE


Patient seen and examined at bedside.


She is in good spirits today. States she has more energy and doesn't feel as 

lightheaded when she stands up. Appetite is improving, tolerating diet.


Family History: Unchanged from Admission


Social History: Unchanged from Admission


Past Medical History: Unchanged from Admission





Objective


Active Medications: 








Acetaminophen (Tylenol Tab*)  650 mg PO Q4H PRN


   PRN Reason: PAIN


Cyanocobalamin (Vitamin B12 Tab*)  1,000 mcg PO DAILY [facility]


   Last Admin: 12/14/17 08:52 Dose:  1,000 mcg


Digoxin (Lanoxin Tab*)  0.125 mg PO SuTuThSa@1700 [facility]


   Last Admin: 12/12/17 22:31 Dose:  0.125 mg


Digoxin (Lanoxin Tab*)  0.25 mg PO MoWeFr@1700 [facility]


   Last Admin: 12/13/17 17:54 Dose:  0.25 mg


Cefepime HCl (Maxipime 2 Gm In Dextrose Duplex (*))  2 gm in 50 mls @ 100 mls/

hr IV Q8H [facility]


   Last Admin: 12/14/17 08:52 Dose:  100 mls/hr


Multivitamins/Minerals (Theragran/Minerals Tab*)  1 tab PO DAILY [facility]


   Last Admin: 12/14/17 08:52 Dose:  1 tab


Ondansetron HCl (Zofran Inj*)  4 mg IV Q4H PRN


   PRN Reason: NAUSEA/VOMITING


Potassium Chloride (Klor Con Er Tab*)  20 meq PO BID [facility]


   Last Admin: 12/14/17 08:52 Dose:  20 meq


Valacyclovir HCl (Valtrex 1 Gm(*))  1 gm PO BID [facility]


   PRN Reason: Protocol








Vital Signs - 8 hr











  12/14/17





  08:00


 


Pulse Rate 87


 


Respiratory 18





Rate 


 


Blood Pressure 108/66





(mmHg) 


 


O2 Sat by Pulse 98





Oximetry 











Oxygen Devices in Use Now: None


Appearance: Pleasant frail lady lying in bed in NAD.


Eyes: No Scleral Icterus


Ears/Nose/Mouth/Throat: Mucous Membranes Moist


Neck: Trachea Midline


Respiratory: Symmetrical Chest Expansion and Respiratory Effort, Clear to 

Auscultation


Cardiovascular: RRR - Normal S1 and S2


Abdominal: NL Sounds; No Tenderness; No Distention


Skin: - - Genital lesions today are more vesicular in nature


Neurological: Alert and Oriented x 3, NL Muscle Strength and Tone


Result Diagrams: 


 12/14/17 09:18





 12/13/17 05:50





Assess/Plan/Problems-Billing


Assessment: 


Mrs. Lisa is a 76yo F with PMH of follicular lymphoma s/p chemo (last 

treatment was last week), glucose intolerance, HTN, Afib, who presented to ED 

with c/o fever, weakness, found to have significant neutropenia.





- Patient Problems


(1) Neutropenic fever


SNOMED Code(s): 478308864


   Comment: - Patient remains afebrile here, but had subjective fever at home.


- WBC 0.9 and ANC is 0.5.


- Continue Cefepime and monitor cell counts.


- D/w Oncology - her pancytopenia should peak 10-14 days after chemo (around now

) and she already received Neulasta, so at this point we just monitor her cell 

counts.


- No signs of mucositis at this time.


- Continue neutropenic precautions.   





(2) Thrombocytopenia


Comment: - Platelets up to 30k today, no signs of bleeding.


- Continue to monitor.   





(3) Folliculitis


Comment: - Her lesions today are vesicular in nature, suggestive of herpes 

simplex.


- Check HSV PCR and start Valacyclovir.   





(4) Atrial fibrillation


Comment: - Continue digoxin.


- Hypomagnesemia corrected.   





(5) DVT prophylaxis


Comment: - Pharmacological prophylaxis contraindicated in the setting of severe 

thrombocytopenia.


- SCDs.   





(6) Full code status





Status and Disposition: 


Inpatient.

## 2017-12-15 LAB
ADD DIFF/SLIDE REVIEW?: (no result)
ADD PATH REVIEW?: YES
HCT VFR BLD AUTO: 28 % (ref 35–47)
HGB BLD-MCNC: 9.4 G/DL (ref 12–16)
HS/VZ SOURCE: (no result)
MCH RBC QN AUTO: 31 PG (ref 27–31)
MCHC RBC AUTO-ENTMCNC: 34 G/DL (ref 31–36)
MCV RBC AUTO: 92 FL (ref 80–97)
RBC # BLD AUTO: 3 10^6/UL (ref 4–5.4)
SPECIMEN SOURCE: (no result)
VARIANT LYMPHS # BLD MANUAL: 2 % (ref 0–6)
WBC # BLD AUTO: 2 10^3/UL (ref 3.5–10.8)

## 2017-12-15 RX ADMIN — POTASSIUM CHLORIDE SCH MEQ: 1500 TABLET, EXTENDED RELEASE ORAL at 08:41

## 2017-12-15 RX ADMIN — POTASSIUM CHLORIDE SCH: 1500 TABLET, EXTENDED RELEASE ORAL at 23:41

## 2017-12-15 RX ADMIN — CYANOCOBALAMIN TAB 500 MCG SCH MCG: 500 TAB at 08:41

## 2017-12-15 RX ADMIN — THERA TABS SCH TAB: TAB at 08:41

## 2017-12-15 RX ADMIN — VALACYCLOVIR HYDROCHLORIDE SCH GM: 1 TABLET, FILM COATED ORAL at 23:41

## 2017-12-15 RX ADMIN — VALACYCLOVIR HYDROCHLORIDE SCH GM: 1 TABLET, FILM COATED ORAL at 08:43

## 2017-12-15 RX ADMIN — CEFEPIME HYDROCHLORIDE SCH MLS/HR: 2 INJECTION, SOLUTION INTRAVENOUS at 08:43

## 2017-12-15 RX ADMIN — CEFEPIME HYDROCHLORIDE SCH MLS/HR: 2 INJECTION, SOLUTION INTRAVENOUS at 02:21

## 2017-12-15 RX ADMIN — DIGOXIN SCH MG: 125 TABLET ORAL at 17:07

## 2017-12-15 RX ADMIN — HEPARIN PRN ML: 100 SYRINGE at 18:18

## 2017-12-15 RX ADMIN — CEFEPIME HYDROCHLORIDE SCH MLS/HR: 2 INJECTION, SOLUTION INTRAVENOUS at 17:07

## 2017-12-15 NOTE — PN
Subjective


Date of Service: 12/15/17


Interval History: 


HOSPITALIST PROGRESS NOTE


Patient seen and examined at bedside.


She feels better today, has a little bit more energy. No lightheadedness when 

using bedside commode. Reluctant to work with PT, but willing to try.


Family History: Unchanged from Admission


Social History: Unchanged from Admission


Past Medical History: Unchanged from Admission





Objective


Active Medications: 








Acetaminophen (Tylenol Tab*)  650 mg PO Q4H PRN


   PRN Reason: PAIN


Cyanocobalamin (Vitamin B12 Tab*)  1,000 mcg PO DAILY [facility]


   Last Admin: 12/15/17 08:41 Dose:  1,000 mcg


Digoxin (Lanoxin Tab*)  0.125 mg PO SuTuThSa@1700 [facility]


   Last Admin: 12/14/17 17:40 Dose:  0.125 mg


Digoxin (Lanoxin Tab*)  0.25 mg PO MoWeFr@1700 [facility]


   Last Admin: 12/13/17 17:54 Dose:  0.25 mg


Heparin Sodium (Porcine) (Heparin Flush Port (Ivad)**)  5 ml FLUSH DAILY PRN; 

Protocol


   PRN Reason: Per Protocol


   Last Admin: 12/14/17 18:59 Dose:  5 ml


Cefepime HCl (Maxipime 2 Gm In Dextrose Duplex (*))  2 gm in 50 mls @ 100 mls/

hr IV Q8H [facility]


   Last Admin: 12/15/17 08:43 Dose:  100 mls/hr


Multivitamins/Minerals (Theragran/Minerals Tab*)  1 tab PO DAILY [facility]


   Last Admin: 12/15/17 08:41 Dose:  1 tab


Ondansetron HCl (Zofran Inj*)  4 mg IV Q4H PRN


   PRN Reason: NAUSEA/VOMITING


Potassium Chloride (Klor Con Er Tab*)  20 meq PO BID [facility]


   Last Admin: 12/15/17 08:41 Dose:  20 meq


Valacyclovir HCl (Valtrex 1 Gm(*))  1 gm PO BID [facility]


   PRN Reason: Protocol


   Last Admin: 12/15/17 08:43 Dose:  1 gm








Vital Signs - 8 hr











  12/15/17 12/15/17 12/15/17





  03:11 08:00 08:13


 


Pulse Rate 96  89


 


Respiratory 18 16 17





Rate   


 


Blood Pressure 103/60  93/68





(mmHg)   


 


O2 Sat by Pulse 97 98 98





Oximetry   











Oxygen Devices in Use Now: None


Appearance: Pleasant lady sitting up in NAD, in good spirits today.


Eyes: No Scleral Icterus


Ears/Nose/Mouth/Throat: Mucous Membranes Moist


Neck: Trachea Midline


Respiratory: Symmetrical Chest Expansion and Respiratory Effort, Clear to 

Auscultation


Cardiovascular: RRR - Normal S1 and S2


Extremities: No Edema


Neurological: Alert and Oriented x 3, NL Muscle Strength and Tone


Result Diagrams: 


 12/15/17 07:20





 12/13/17 05:50





Assess/Plan/Problems-Billing


Assessment: 


Mrs. Lisa is a 78yo F with PMH of follicular lymphoma s/p chemo (last 

treatment was last week), glucose intolerance, HTN, Afib, who presented to ED 

with c/o fever, weakness, found to have significant neutropenia.





- Patient Problems


(1) Neutropenic fever


SNOMED Code(s): 107164630


   Comment: - Patient remains afebrile here, but had subjective fever at home.


- WBC 2.0 and ANC is 1.1.


- Continue Cefepime and monitor cell counts.


- D/w Oncology - her pancytopenia should peak 10-14 days after chemo (around now

) and she already received Neulasta, so at this point we just monitor her cell 

counts.


- No signs of mucositis at this time.   





(2) Thrombocytopenia


Comment: - Platelets up to 42k today, no signs of bleeding.


- Continue to monitor.   





(3) Atrial fibrillation


Comment: - Continue digoxin.


- Hypomagnesemia corrected.   





(4) Herpes genitalis


Comment: - Follow HSV PCR and continue Valacyclovir.   





(5) DVT prophylaxis


Comment: - Pharmacological prophylaxis contraindicated in the setting of severe 

thrombocytopenia.


- SCDs.   





(6) Full code status





Status and Disposition: 


Inpatient. Encouraged to work with PT so we can plan her discharge.

## 2017-12-16 LAB
HCT VFR BLD AUTO: 29 % (ref 35–47)
HGB BLD-MCNC: 9.8 G/DL (ref 12–16)
MCH RBC QN AUTO: 31 PG (ref 27–31)
MCHC RBC AUTO-ENTMCNC: 34 G/DL (ref 31–36)
MCV RBC AUTO: 92 FL (ref 80–97)
RBC # BLD AUTO: 3.12 10^6/UL (ref 4–5.4)
WBC # BLD AUTO: 3.9 10^3/UL (ref 3.5–10.8)

## 2017-12-16 RX ADMIN — POTASSIUM CHLORIDE SCH MEQ: 1500 TABLET, EXTENDED RELEASE ORAL at 09:39

## 2017-12-16 RX ADMIN — VALACYCLOVIR HYDROCHLORIDE SCH GM: 1 TABLET, FILM COATED ORAL at 21:27

## 2017-12-16 RX ADMIN — CEFEPIME HYDROCHLORIDE SCH MLS/HR: 2 INJECTION, SOLUTION INTRAVENOUS at 09:38

## 2017-12-16 RX ADMIN — VALACYCLOVIR HYDROCHLORIDE SCH GM: 1 TABLET, FILM COATED ORAL at 09:39

## 2017-12-16 RX ADMIN — DIGOXIN SCH MG: 125 TABLET ORAL at 17:13

## 2017-12-16 RX ADMIN — CYANOCOBALAMIN TAB 500 MCG SCH MCG: 500 TAB at 09:39

## 2017-12-16 RX ADMIN — CEFEPIME HYDROCHLORIDE SCH MLS/HR: 2 INJECTION, SOLUTION INTRAVENOUS at 17:09

## 2017-12-16 RX ADMIN — THERA TABS SCH TAB: TAB at 09:39

## 2017-12-16 RX ADMIN — CEFEPIME HYDROCHLORIDE SCH MLS/HR: 2 INJECTION, SOLUTION INTRAVENOUS at 02:50

## 2017-12-16 NOTE — PN
Subjective


Date of Service: 12/16/17


Interval History: 


.


Interviewed and examined patient at bedside;


Discussed case with Dr. Smith ;


Reviewed previous notes and radiology results;





Patient in good spirits


Getting stronger- but slowly.


Denies HA, SOB, CP.


Eating ok.


Eager to see relative in [place] on Monday -- Plan to dc home early monday 

AM.








No longer neutropenic





 Laboratory Tests











  12/12/17 12/16/17





  14:20 10:06


 


WBC   3.9


 


Neut % (Auto)   80.2


 


Absolute Neuts (auto)  0.1 L*  3.2














Family History: Unchanged from Admission


Social History: Unchanged from Admission


Past Medical History: Unchanged from Admission





Objective


Active Medications: 


.


Acetaminophen (Tylenol Tab*)  650 mg PO Q4H PRN


   PRN Reason: PAIN


Cyanocobalamin (Vitamin B12 Tab*)  1,000 mcg PO DAILY [facility]


   Last Admin: 12/16/17 09:39 Dose:  1,000 mcg


Digoxin (Lanoxin Tab*)  0.125 mg PO SuTuThSa@1700 [facility]


   Last Admin: 12/16/17 17:13 Dose:  0.125 mg


Digoxin (Lanoxin Tab*)  0.25 mg PO MoWeFr@1700 [facility]


   Last Admin: 12/15/17 17:07 Dose:  0.25 mg


Heparin Sodium (Porcine) (Heparin Flush Port (Ivad)**)  5 ml FLUSH DAILY PRN; 

Protocol


   PRN Reason: Per Protocol


   Last Admin: 12/15/17 18:18 Dose:  5 ml


Cefepime HCl (Maxipime 2 Gm In Dextrose Duplex (*))  2 gm in 50 mls @ 100 mls/

hr IV Q8H [facility]


   Last Admin: 12/16/17 17:09 Dose:  100 mls/hr


Multivitamins/Minerals (Theragran/Minerals Tab*)  1 tab PO DAILY [facility]


   Last Admin: 12/16/17 09:39 Dose:  1 tab


Ondansetron HCl (Zofran Inj*)  4 mg IV Q4H PRN


   PRN Reason: NAUSEA/VOMITING


Potassium Chloride (Klor Con Er Tab*)  20 meq PO BID [facility]


   Last Admin: 12/16/17 09:39 Dose:  20 meq


Valacyclovir HCl (Valtrex 1 Gm(*))  1 gm PO BID [facility]


   PRN Reason: Protocol


   Last Admin: 12/16/17 09:39 Dose:  1 gm


.





 Vital Signs - 8 hr











  12/16/17 12/16/17





  11:53 15:47


 


Temperature  98.3 F


 


Pulse Rate 103 95


 


Respiratory 18 17





Rate  


 


Blood Pressure 82/60 101/62





(mmHg)  


 


O2 Sat by Pulse 97 98





Oximetry  











Oxygen Devices in Use Now: None


Appearance: NAD


Eyes: No Scleral Icterus


Ears/Nose/Mouth/Throat: Clear Oropharnyx


Neck: Trachea Midline


Respiratory: Symmetrical Chest Expansion and Respiratory Effort


Cardiovascular: NL Sounds; No Murmurs; No JVD


Abdominal: NL Sounds; No Tenderness; No Distention


Lymphatic: No Cervical Adenopathy


Extremities: No Edema


Skin: No Rash or Ulcers


Neurological: Alert and Oriented x 3


Lines/Tubes/Other Access: Clean, Dry and Intact Peripheral IV


Nutrition: Taking PO's


Result Diagrams: 


 12/16/17 10:06





 12/13/17 05:50





Assess/Plan/Problems-Billing


.


Assessment: 


Mrs. Lisa is a 76yo F with PMH of follicular lymphoma s/p chemo (last 

treatment (R-CHOP) was last week), glucose intolerance, HTN, Afib, who 

presented to ED with c/o fever, weakness, found to have significant neutropenia.





- Patient Problems


(1) Neutropenic fever


Current Visit: Yes   Status: Acute   Priority: High   Code(s): D70.9 - 

NEUTROPENIA, UNSPECIFIED; R50.81 - FEVER PRESENTING WITH CONDITIONS CLASSIFIED 

ELSEWHERE   Comment: 


- Patient remains afebrile here, but had subjective fever at home.


- ANC > 3 now


- Continue Cefepime - change to oral abx tomorow.


- No signs of mucositis   





(2) Atrial fibrillation


Current Visit: Yes   Status: Acute   Priority: High   Code(s): I48.91 - 

UNSPECIFIED ATRIAL FIBRILLATION   Comment: 


- Continue digoxin.


- Hypomagnesemia corrected.   





(3) Folliculitis


Current Visit: Yes   Status: Acute   Priority: High   Code(s): L73.9 - 

FOLLICULAR DISORDER, UNSPECIFIED   Comment: 


- Cefepime/Valacyclovir.   





(4) Full code status


Current Visit: Yes   Status: Acute   Code(s): Z78.9 - OTHER SPECIFIED HEALTH 

STATUS   





(5) Herpes genitalis


Current Visit: Yes   Status: Acute   Code(s): A60.00 - HERPESVIRAL INFECTION OF 

UROGENITAL SYSTEM, UNSPECIFIED   SNOMED Code(s): 59731152


   Comment: 


-HSV PCR (+ for HSV-II); continue Valacyclovir.





   





(6) DVT prophylaxis


Current Visit: Yes   Status: Acute   Code(s): FBP4960 -    Comment: 


- Pharmacological prophylaxis contraindicated in the setting of severe 

thrombocytopenia.


- SCDs ordered   


Status and Disposition: 


Inpatient. Encouraged to work with PT so we can plan her discharge.

## 2017-12-17 LAB
ANION GAP SERPL CALC-SCNC: 4 MMOL/L (ref 2–11)
BUN SERPL-MCNC: 17 MG/DL (ref 6–24)
BUN/CREAT SERPL: 23.9 (ref 8–20)
CALCIUM SERPL-MCNC: 9.6 MG/DL (ref 8.6–10.3)
CHLORIDE SERPL-SCNC: 107 MMOL/L (ref 101–111)
GLUCOSE SERPL-MCNC: 99 MG/DL (ref 70–100)
HCO3 SERPL-SCNC: 27 MMOL/L (ref 22–32)
HCT VFR BLD AUTO: 27 % (ref 35–47)
HGB BLD-MCNC: 9.2 G/DL (ref 12–16)
MCH RBC QN AUTO: 31 PG (ref 27–31)
MCHC RBC AUTO-ENTMCNC: 34 G/DL (ref 31–36)
MCV RBC AUTO: 92 FL (ref 80–97)
POTASSIUM SERPL-SCNC: 3.9 MMOL/L (ref 3.5–5)
RBC # BLD AUTO: 2.96 10^6/UL (ref 4–5.4)
SODIUM SERPL-SCNC: 138 MMOL/L (ref 133–145)
WBC # BLD AUTO: 4 10^3/UL (ref 3.5–10.8)

## 2017-12-17 RX ADMIN — POTASSIUM CHLORIDE SCH: 1500 TABLET, EXTENDED RELEASE ORAL at 01:12

## 2017-12-17 RX ADMIN — VALACYCLOVIR HYDROCHLORIDE SCH GM: 1 TABLET, FILM COATED ORAL at 09:28

## 2017-12-17 RX ADMIN — LEVOFLOXACIN SCH MG: 500 TABLET, FILM COATED ORAL at 10:25

## 2017-12-17 RX ADMIN — POTASSIUM CHLORIDE SCH MEQ: 1500 TABLET, EXTENDED RELEASE ORAL at 20:08

## 2017-12-17 RX ADMIN — POTASSIUM CHLORIDE SCH MEQ: 1500 TABLET, EXTENDED RELEASE ORAL at 09:28

## 2017-12-17 RX ADMIN — LOPERAMIDE HYDROCHLORIDE PRN MG: 2 CAPSULE ORAL at 20:34

## 2017-12-17 RX ADMIN — DIGOXIN SCH MG: 125 TABLET ORAL at 17:01

## 2017-12-17 RX ADMIN — CYANOCOBALAMIN TAB 500 MCG SCH MCG: 500 TAB at 09:28

## 2017-12-17 RX ADMIN — VALACYCLOVIR HYDROCHLORIDE SCH GM: 1 TABLET, FILM COATED ORAL at 20:08

## 2017-12-17 RX ADMIN — CEFEPIME HYDROCHLORIDE SCH MLS/HR: 2 INJECTION, SOLUTION INTRAVENOUS at 02:31

## 2017-12-17 RX ADMIN — CEFEPIME HYDROCHLORIDE SCH: 2 INJECTION, SOLUTION INTRAVENOUS at 10:13

## 2017-12-17 RX ADMIN — THERA TABS SCH TAB: TAB at 09:28

## 2017-12-17 NOTE — PN
Hospitalist Progress Note


Date of Service: 12/18/17


.


HOSPITALIST DISCHARGE NOTE:





See dc instructions and summary by me.


Patient stable for dc


dc instructions reviewed with the patient at the bedside.





DC patient home today.

## 2017-12-18 VITALS — SYSTOLIC BLOOD PRESSURE: 89 MMHG | DIASTOLIC BLOOD PRESSURE: 46 MMHG

## 2017-12-18 LAB
ALBUMIN SERPL BCG-MCNC: 3.6 G/DL (ref 3.2–5.2)
ALP SERPL-CCNC: 60 U/L (ref 34–104)
ALT SERPL W P-5'-P-CCNC: 10 U/L (ref 7–52)
AST SERPL-CCNC: 15 U/L (ref 13–39)
GLOBULIN SER CALC-MCNC: 2.3 G/DL (ref 2–4)
PROT SERPL-MCNC: 5.9 G/DL (ref 6.4–8.9)
URATE SERPL-MCNC: 4.7 MG/DL (ref 2.3–6.6)

## 2017-12-18 RX ADMIN — LEVOFLOXACIN SCH MG: 500 TABLET, FILM COATED ORAL at 08:20

## 2017-12-18 RX ADMIN — LOPERAMIDE HYDROCHLORIDE PRN MG: 2 CAPSULE ORAL at 08:28

## 2017-12-18 RX ADMIN — CYANOCOBALAMIN TAB 500 MCG SCH MCG: 500 TAB at 08:20

## 2017-12-18 RX ADMIN — THERA TABS SCH TAB: TAB at 08:20

## 2017-12-18 RX ADMIN — POTASSIUM CHLORIDE SCH MEQ: 1500 TABLET, EXTENDED RELEASE ORAL at 08:20

## 2017-12-18 RX ADMIN — VALACYCLOVIR HYDROCHLORIDE SCH GM: 1 TABLET, FILM COATED ORAL at 08:20

## 2017-12-18 NOTE — DS
CC:  Dr. Hansen; Dr. Garcia *

 

DISCHARGE SUMMARY:

 

DATE OF ADMISSION:  12/12/17

 

DATE OF DISCHARGE:  12/18/17 (dictated in advance)

 

PRIMARY CARE PROVIDER:  Dr. Prachi Hansen.

 

ONCOLOGIST:  Dr. Garcia at [facility].

 

PRINCIPAL DISCHARGE DIAGNOSIS:  Neutropenic fever - resolved with restoration 
of ANC to normal levels.

 

SECONDARY DIAGNOSES:

1.  Follicular lymphoma/status post sixth round of R-CHOP on 12/04/17 and 12/05/
17 with 4 days of subsequent steroids and Neupogen with subsequent fevers 
necessitating this admission.

2.  Lyme's disease in fall of 2016.

3.  History of prediabetes.

4.  History of hypertension, off antihypertensive medicine since March 2017.

5.  Hiatal hernia.

6.  Atrial fibrillation.

 

DISCHARGE MEDICATION REGIMEN:

1.  Levaquin 500 mg daily for an additional 5 days then stop.

2.  Digoxin 0.125 mg alternating with 0.25 mg by mouth daily.

3.  Multivitamin one tablet by mouth once daily.

4.  Vitamin B12 1000 mcg by mouth daily.

5.  Valacyclovir/Valtrex 1 g p.o. b.i.d.

6.  Potassium 20 mEq by mouth twice daily.

7.  Loperamide OTC p.r.n.

 

HISTORY OF PRESENT ILLNESS AND HOSPITAL COURSE:  Please see the history and 
physical by Dr. Erma Montes and Noreen Nicolas, NP.  This was on 12/12/17.
  In brief, Ms. Lisa is a 77-year-old female who came to the [facility] 
Room for weakness.  She was diagnosed with follicular lymphoma in July 2017 and 
again R-CHOP in August 2017.  She has just finished her sixth and final round 
of R-CHOP, which was without complication.  She had post infusion steroids for 
4 days.  The patient completed that on 12/08.  In the 2 to 3 days prior to this 
admission, she was tired and weak and was faint with standing.  She did not 
ever lose consciousness.  She said that her blood pressure was low at times.  
She had 2 small swollen areas in her suprapubic area and 1 on her right inner 
buttocks that were sore.  She came to the emergency room and had a blood 
pressure in the 90s/50s, and she had a fever of greater than 99 and reported 
fever at home.  The patient was admitted for neutropenic fever.  She was 
started on IV cefepime by protocol.  The patient was also treated with 
valacyclovir for general herpes that was positive by PCR testing. The patient 
had an ANC that was virtually 0.  She had already received Neupogen. She was on 
neutropenic precautions over the subsequent days.  She was monitored for her ANC
, which improved to 3200 cells/dL by 12/16.

 

During this hospitalization, she became aware of a relative that is dying in 
[place] and intends to go and visit that relative on Monday.  Accordingly, 
she is being discharged on Monday morning, 12/18/17.  She will go to the 
[place] area under the supervision of her son.  She will come back and 
follow up with Dr. Hansen and Dr. Garcia as scheduled.  She will continue and 
complete a 5-day course of Levaquin to ensure full treatment of any culprit 
infection causing her fever.

 

TIME SPENT:  Total time taken to discharge Ms. Lisa was 45 minutes, greater 
than half that time was spent at the bedside going over the discharge 
instructions face- to-face with the patient.

 

 [id number]/CPS #: [id number]

MTDD

## 2018-04-11 ENCOUNTER — HOSPITAL ENCOUNTER (EMERGENCY)
Dept: HOSPITAL 25 - UCEAST | Age: 78
Discharge: HOME | End: 2018-04-11
Payer: MEDICARE

## 2018-04-11 VITALS — DIASTOLIC BLOOD PRESSURE: 60 MMHG | SYSTOLIC BLOOD PRESSURE: 107 MMHG

## 2018-04-11 DIAGNOSIS — C82.90: ICD-10-CM

## 2018-04-11 DIAGNOSIS — Z88.8: ICD-10-CM

## 2018-04-11 DIAGNOSIS — R49.9: Primary | ICD-10-CM

## 2018-04-11 DIAGNOSIS — R09.89: ICD-10-CM

## 2018-04-11 DIAGNOSIS — T36.0X5A: ICD-10-CM
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
## 2018-04-11 DIAGNOSIS — Z88.2: ICD-10-CM

## 2018-04-11 PROCEDURE — G0463 HOSPITAL OUTPT CLINIC VISIT: HCPCS

## 2018-04-11 PROCEDURE — 99211 OFF/OP EST MAY X REQ PHY/QHP: CPT

## 2018-04-11 NOTE — XMS REPORT
Suellen Patel

 Created on:March 15, 2018



Patient:Suellen Patel

Sex:Female

:1940

External Reference #:[id number]





Demographics







 Address  [address] [place] [address]

 

 Mobile Phone  [phone]

 

 Email Address  [email or web address]

 

 Preferred Language  English

 

 Marital Status  Not  Or 

 

 Caodaism Affiliation  Unknown

 

 Race  White

 

 Ethnic Group  Not  Or 









Author







 Organization  [facility]

 

 Address  [address] [address]

 

 Phone  [phone]









Support







 Name  Relationship  Address  Phone

 

 THEA Patel  Unavailable  Unavailable  [phone]









Care Team Providers







 Name  Role  Phone

 

 Prachi Hansen MD  Primary Care Physician  Unavailable









Payers







 Type  Date  Identification Numbers  Payment  Subscriber



       Provider  

 

 Health Maintenance  Expires:  Policy Number:  Medicare Blue  Suellen Patel



 [facility]  10/01/2012  [id number]  Ppo  









 PayID:   [address] [id number]









 [place] [id number]









 Health Maintenance    Policy Number:  Medicare Blue Ppo  Suellen Patel



 [facility]    [id number]    









 Group Number: [id number]  [address] [address]

 

 PayID:   [place] [id number]







Problems







 Date  Description  Provider  Status

 

 Onset:   Paroxysmal atrial fibrillation    Active

 

 Onset: 2011  Impaired fasting glycaemia  Vikki Richardson, N.PCapo  Active

 

 Onset: 2011  Benign essential hypertension  Vikki Richardson, N.PCapo  Active

 

 Onset: 2011  Hyperlipidemia  Vikki Richardson, N.PCapo  Active

 

 Onset: 2017  Osteopenia  Prachi Hansen M.D.  Active

 

 Onset: 2017  Ascites  Prachi Hansen M.D.  Active

 

 Onset: 2017  Hydronephrosis  Prachi Hansen M.D.  Active









   Note: L side









 Onset: 2017  Calculus of kidney and ureter  Prachi Hansen M.D.  Active









   Note: 1.4 cm









 Onset: 2017  Retroperitoneal mass  Prachi Hansen M.D.  Active

 

 Onset:   B-cell lymphoma (clinical)    Active









   Note: high [place] disease follicular type stage 3 Dr. Pallawi









 Onset: 2017  Pelvic mass  Prachi Hansen M.D.  Inactive









 Inactive: 2017







Family History







 Date  Family Member(s)  Problem(s)  Comments

 

   General  adopted  

 

   General  No Current Problems  

 

   Mother  Stroke  

 

   Siblings  2  2 half sisters she has not met. 1 of which



       has an eating disorder







Social History







 Type  Date  Description  Comments

 

 Marital Status      

 

 Lives With    Son  

 

 Occupation      microbiologist

 

 Cigarette Use    Never Smoked Cigarettes  

 

 ETOH Use    Occasionally consumes  



     alcohol  

 

 Smoking    Patient has never smoked  

 

 Recreational Drug Use    Denies Drug Use  Has a prescription for



       medicinal marijuana-but does



       not use currently 17

 

 Daily Caffeine    Comsumes on average 1 cup  



     of decaff coffee per day  

 

 Exercise Type/Frequency    Does not exercise  

 

 General Hx Text      Lives in [place], no time in



       the yard







Allergies, Adverse Reactions, Alerts







 Date  Description  Reaction  Status  Severity  Comments

 

 2010  Sulfa  INTOLERANCE-GI UPSET  active  Moderate  

 

 2017  Rituximab    active    







Medications







 Medication  Date  Status  Form  Strength  Qnty  SIG  Indications  Ordering



                 Provider

 

 Magnesium  /  Active  Tablets  250mg  90tabs  1 by mouth    Jorge Alberto



 Oxide  2017          every other    F.



             day    MACHELLE Willett

 

 Digoxin  /  Active  Tablets  125mcg  120tab  2 tabs by    Jorge Alberto



           s  mouth every    F.



             m,w,f and 1    Mauser,



             tab every    M.D.



             other day of    



             the week    

 

 Vitamin B12  /  Active  Tablets ER  1000mcg    once a day    Prachi Hansen M.D.

 

 Multi For Her  /  Active  Tablets      once a day    Prachi Hansen M.D.

 

 Potassium  /  Active  Tablets ER  20Meq  90tabs  takes    Jorge Alberto



 Chloride ER  0000          occasionally    F.



             ---1 by    Mauser,



             mouth every    M.D.



             day    

 

 Medical  /  Active        2 puffs CTB    Unknown



 Marijuana  0000          and THC at    



             night  [place] in    



             [place]    

 

                 

 

 Magnesium  /  Hx  Tablets  250mg  90tabs  1 by mouth    Jorge Alberto



 Oxide  2017 -          every day    F.



   /              Afia Willett M.D.

 

 Magnesium  /  Hx  Tablets  250mg  90tabs  1 by mouth    Jorge Alberto



 Oxide -MG  2017 -          every day    F.



 Supplement  /              Mauser,



   2017              M.D.

 

 Keflex  /  Hx  Capsules  500mg  10caps  1 by mouth    Prachi



   2017 -          twice a day    Hansen,



   /          x 5 days    M.D.



   2017              

 

 Magnesium  /  Hx  Tablets  250mg  30tabs  1 by mouth    Jorge Alberto



 Oxide -MG  2017 -          every day    F.



 Supplement                Brennon2017              M.D.

 

 Magnesium  /  Hx  Tablets  250mg  30tabs  1 by mouth    Jorge Alberto



 Oxide  2017 -          every day    F.



                 Brennon



                 M.D.

 

 No Active  /  Hx            Unknown



 Medications  2017 -              



   2017              

 

 Digoxin  /  Hx  Tablets  125mcg  30tabs  1 by mouth    Jorge Alberto



   2017 -          daily    F.



   use2017              M.D.

 

 No Active  /  Hx            Unknown



 Medications   -              



   2017              

 

 Cipro  /  Hx  Tablets  500mg  14tabs  1 by mouth  N39.0  Prachi



    -          twice a day    Jade,



   /          for 7 days    M.D.



                 

 

 Cipro  /  Hx  Tablets  250mg  14tabs  Did Not Take    Vikki



    -              Varn,



   /              N.P.



   2017              

 

 Cephalexin  /  Hx  Capsules  500mg  21caps  take one  L03.114  Mani



    -          capsule    Peg, NP



   /          every 8    



   2016          hours for 7    



             days    

 

 Ventolin HFA  /  Hx  Aerosol  108(90Base  1inhal  1 to 2    Vikki



    -      ) mcg/Act  er  inhalations    Varn,



   /          every 4    N.P.



   2015          hours as    



             needed    

 

 Zostavax  /  Hx  Solution  82532Gkj/0  1units  1 dose s/c    Ailyn



    -    Rec  .65ML        Antonia,



   /              M.D.,



   2016              FACP

 

 Crutches  /  Hx      1Pair  Use as  729.4  Ailyn



    -          needed for    Antonia,



   /          plantar    M.D.,



   2015          fasciitis    FACP

 

 Simvastatin  /  Hx  Tablets  20mg  90tabs  1 by mouth    Ailyn



    -          at bedtime    Antonia,



   /              M.D.,



                 FACP

 

 Physical  /  Hx    Back Pain        Ailyn



 Therapy   -              Antonia,



   /              M.D.,



                 FACP

 

 Atenolol  /  Hx  Tablets  25mg  90tabs  take 1    Prachi



   0000 -          tablet by    Jade,



   /          mouth once    M.D.



             daily    

 

 Doxycycline  /  Hx  Capsules  100mg  22caps  Take 1    Amos E.



 Hyclate  0000 -          capsule by    Benita,



   /          mouth twice    M.D.



             daily    

 

 Tramadol HCL  /  Hx  Tablets  50mg        Unknown



   0000 -              



   2016              

 

 Ondansetron  /  Hx  Tablets  4mg    dissolve one    Unknown



   0000 -    Dispers      tablet    



   /          orally every    



             12 hours as    



             needed for    



             nausea.    

 

 R-Chop Chemo  /  Hx        Done    Unknown



 Medications  0000 -              



   03/15/              



   2018              







Immunizations







 CPT Code  Status  Date  Vaccine  Lot #

 

 [id number]  Given  2018  Pneumococcal Conjugate Vaccine 13 Valent For  [id number]



       Intramuscular Use  

 

 34766  Given  2018  Influenza Virus Vaccine, Quadrivalent, Split,  



       Preservative Free  

 

 53945  Given  2016  Influenza Virus Vaccine, Quadrivalent, Split,  
[id number]



       Preservative Free  

 

   Given  2014  Fluvirin Im 3Yrs And Older  

 

 58574  Given  2014  Tdap - Tetanus/Diptheria/Acellular Pertussis  

 

   Given  08/15/2013  Afluria Vaccine  

 

 45768  Given  08/15/2013  Zoster (Zostavax)  

 

   Given  2012  Fluzone Vaccine  [id number]

 

 54630  Given  2010  Influenza Virus 3Yrs &amp; Over  

 

 05631  Given  2010  Influenza Virus Vaccine, Pandemic Formulation  

 

 22695  Given  2010  Influenza Virus 3Yrs &amp; Over  

 

 70945  Given  2010  Administration Swine Flu Shot  

 

 45214  Given  2006  Influenza Virus 3Yrs &amp; Over  







Vital Signs







 Date  Vital  Result  Comment

 

 03/15/2018  Weight  144.00 lb  









 Heart Rate  95 /min  

 

 BP Systolic Sitting  120 mmHg  

 

 BP Diastolic Sitting  72 mmHg  

 

 O2 % BldC Oximetry  96 %  









 2018  Weight  144.12 lb  









 Heart Rate  98 /min  

 

 BP Systolic Sitting  110 mmHg  

 

 BP Diastolic Sitting  60 mmHg  

 

 Body Temperature  97.8 F  

 

 O2 % BldC Oximetry  95 %  









 2017  Height  65 inches  5'5"









 Weight  135.00 lb  with shoes

 

 Heart Rate  106 /min  

 

 BP Systolic Sitting  126 mmHg  LA small cuff

 

 BP Diastolic Sitting  72 mmHg  LA small cuff

 

 BP Systolic Standing  101 mmHg  la repeat sitting

 

 BP Diastolic Standing  64 mmHg  la repeat sitting

 

 BMI (Body Mass Index)  22.5 kg/m2  

 

 Ejection Fraction  55% - 60%  echo 10/30/17









 2017  Height  65 inches  5'5"









 Weight  167.00 lb  

 

 Heart Rate  100 /min  

 

 BP Systolic Sitting  130 mmHg  

 

 BP Diastolic Sitting  76 mmHg  

 

 Body Temperature  98.4 F  

 

 O2 % BldC Oximetry  95 %  

 

 BMI (Body Mass Index)  27.8 kg/m2  









 2017  Height  65 inches  5'5"









 Weight  168.00 lb  

 

 Heart Rate  97 /min  

 

 BP Systolic  120 mmHg  

 

 BP Diastolic  80 mmHg  

 

 Body Temperature  98.2 F  

 

 O2 % BldC Oximetry  98 %  

 

 BMI (Body Mass Index)  28.0 kg/m2  









 2017  Height  65 inches  5'5"









 Weight  180.38 lb  

 

 Heart Rate  79 /min  

 

 BP Systolic  120 mmHg  

 

 BP Diastolic  76 mmHg  

 

 Body Temperature  97.2 F  

 

 O2 % BldC Oximetry  96 %  

 

 BMI (Body Mass Index)  30.0 kg/m2  









 2017  Weight  184.12 lb  









 Heart Rate  90 /min  

 

 BP Systolic  120 mmHg  

 

 BP Diastolic  80 mmHg  

 

 Body Temperature  98.2 F  

 

 O2 % BldC Oximetry  96 %  









 2016  Weight  183.00 lb  









 Heart Rate  88 /min  

 

 BP Systolic Sitting  122 mmHg  

 

 BP Diastolic Sitting  64 mmHg  

 

 Respiratory Rate  15 /min  

 

 Body Temperature  97.4 F  

 

 O2 % BldC Oximetry  98 %  









 2016  Weight  184.00 lb  









 Heart Rate  80 /min  

 

 BP Systolic Sitting  112 mmHg  

 

 BP Diastolic Sitting  64 mmHg  

 

 Body Temperature  98.4 F  

 

 O2 % BldC Oximetry  97 %  









 2016  Height  65.5 inches  5'5.50"









 Weight  184.25 lb  

 

 Heart Rate  64 /min  

 

 BP Systolic Sitting  120 mmHg  

 

 BP Diastolic Sitting  82 mmHg  

 

 Respiratory Rate  14 /min  

 

 Body Temperature  98.0 F  

 

 BMI (Body Mass Index)  30.2 kg/m2  









 2016  Height  65.5 inches  5'5.50"









 Weight  184.12 lb  

 

 Heart Rate  72 /min  

 

 BP Systolic Sitting  132 mmHg  

 

 BP Diastolic Sitting  82 mmHg  

 

 Body Temperature  97.3 F  

 

 O2 % BldC Oximetry  98 %  

 

 BMI (Body Mass Index)  30.2 kg/m2  









 2016  Weight  184.50 lb  









 Heart Rate  92 /min  

 

 BP Systolic Sitting  126 mmHg  

 

 BP Diastolic Sitting  70 mmHg  

 

 Body Temperature  99.0 F  

 

 O2 % BldC Oximetry  96 %  









 2016  Weight  192.00 lb  









 Heart Rate  84 /min  

 

 BP Systolic Sitting  144 mmHg  

 

 BP Diastolic Sitting  84 mmHg  

 

 Respiratory Rate  15 /min  

 

 Body Temperature  100.2 F  

 

 O2 % BldC Oximetry  98 %  









 2015  Height  65.5 inches  5'5.50"









 Weight  192.00 lb  

 

 Heart Rate  66 /min  

 

 BP Systolic Sitting  128 mmHg  

 

 BP Diastolic Sitting  80 mmHg  

 

 Body Temperature  98.7 F  

 

 O2 % BldC Oximetry  97 %  

 

 BMI (Body Mass Index)  31.5 kg/m2  









 2012  Height  65.5 inches  5'5.50"









 Weight  190.00 lb  

 

 Heart Rate  60 /min  

 

 BP Systolic Sitting  122 mmHg  

 

 BP Diastolic Sitting  76 mmHg  

 

 BMI (Body Mass Index)  31.1 kg/m2  









 2011  Height  65.5 inches  5'5.50"









 Weight  187.00 lb  

 

 Heart Rate  58 /min  

 

 BP Systolic Sitting  132 mmHg  L

 

 BP Diastolic Sitting  80 mmHg  L

 

 BMI (Body Mass Index)  30.6 kg/m2  









 2010  Weight  179.00 lb  









 Heart Rate  62 /min  

 

 BP Systolic  108 mmHg  

 

 BP Diastolic  68 mmHg  









 2010  Weight  178.25 lb  









 Heart Rate  72 /min  

 

 BP Systolic  116 mmHg  

 

 BP Diastolic  70 mmHg  







Results







 Test  Date  Test  Result  H/L  Range  Note

 

 Comp Metabolic Panel  2018  Sodium  139 mmol/L    133-145  









 Potassium  3.9 mmol/L    3.5-5.0  

 

 Chloride  106 mmol/L    101-111  

 

 Co2 Carbon Dioxide  28 mmol/L    22-32  

 

 Anion Gap  5 mmol/L    2-11  

 

 Glucose  108 mg/dL  High    

 

 Blood Urea Nitrogen  25 mg/dL  High  6-24  

 

 Creatinine  0.72 mg/dL    0.51-0.95  

 

 BUN/Creatinine Ratio  34.7  High  8-20  

 

 Calcium  9.6 mg/dL    8.6-10.3  

 

 Total Protein  6.3 g/dL  Low  6.4-8.9  

 

 Albumin  4.2 g/dL    3.2-5.2  

 

 Globulin  2.1 g/dL    2-4  

 

 Albumin/Globulin Ratio  2.0    1-3  

 

 Total Bilirubin  0.50 mg/dL    0.2-1.0  

 

 Alkaline Phosphatase  55 U/L      

 

 Alt  18 U/L    7-52  

 

 Ast  22 U/L    13-39  

 

 Egfr Non-  79.0    &gt;60  

 

 Egfr   101.6    &gt;60  1









 Laboratory test finding  2018  Magnesium  1.7 mg/dL  Low  1.9-2.7  









 Digoxin  0.5 ng/ml  Low  0.8-2.0  









 Comp Metabolic Panel  2018  Sodium  138 mmol/L    133-145  









 Potassium  3.8 mmol/L    3.5-5.0  

 

 Chloride  104 mmol/L    101-111  

 

 Co2 Carbon Dioxide  29 mmol/L    22-32  

 

 Anion Gap  5 mmol/L    2-11  

 

 Glucose  116 mg/dL  High    

 

 Blood Urea Nitrogen  22 mg/dL    6-24  

 

 Creatinine  0.81 mg/dL    0.51-0.95  

 

 BUN/Creatinine Ratio  27.2  High  8-20  

 

 Calcium  9.2 mg/dL    8.6-10.3  

 

 Total Protein  5.6 g/dL  Low  6.4-8.9  

 

 Albumin  3.8 g/dL    3.2-5.2  

 

 Globulin  1.8 g/dL  Low  2-4  

 

 Albumin/Globulin Ratio  2.1    1-3  

 

 Total Bilirubin  0.40 mg/dL    0.2-1.0  

 

 Alkaline Phosphatase  49 U/L      

 

 Alt  13 U/L    7-52  

 

 Ast  19 U/L    13-39  

 

 Egfr Non-  68.9    &gt;60  

 

 Egfr   88.6    &gt;60  2









 Laboratory test finding  2018  Uric Acid  5.6 mg/dL    2.3-6.6  









 LDH  121 U/L  Low  140-271  









 CBC Auto Diff  2018  White Blood Count  4.2 10^3/uL    3.5-10.8  









 Red Blood Count  3.58 10^6/uL  Low  4.0-5.4  

 

 Hemoglobin  11.1 g/dL  Low  12.0-16.0  

 

 Hematocrit  33 %  Low  35-47  

 

 Mean Corpuscular Volume  93 fL    80-97  

 

 Mean Corpuscular Hemoglobin  31 pg    27-31  

 

 Mean Corpuscular HGB Conc  33 g/dL    31-36  

 

 Red Cell Distribution Width  15 %    10.5-15  

 

 Platelet Count  115 10^3/uL  Low  150-450  

 

 Mean Platelet Volume  8 um3    7.4-10.4  

 

 Abs Neutrophils  3.2 10^3/uL    1.5-7.7  

 

 Abs Lymphocytes  0.5 10^3/uL  Low  1.0-4.8  

 

 Abs Monocytes  0.5 10^3/uL    0-0.8  

 

 Abs Eosinophils  0.1 10^3/uL    0-0.6  

 

 Abs Basophils  0 10^3/uL    0-0.2  

 

 Abs Nucleated RBC  0 10^3/uL      

 

 Granulocyte %  76.0 %    38-83  

 

 Lymphocyte %  10.9 %  Low  25-47  

 

 Monocyte %  11.4 %  High  1-9  

 

 Eosinophil %  1.2 %    0-6  

 

 Basophil %  0.5 %    0-2  

 

 Nucleated Red Blood Cells %  0.1      









 Urinalysis Profile  2017  Urine Color  Yellow      









 Urine Appearance  Clear      

 

 Urine Specific Gravity  1.003  Low  1.010-1.030  

 

 Urine pH  7.0    5-9  

 

 Urine Urobilinogen  Negative    Negative  

 

 Urine Ketones  Negative    Negative  

 

 Urine Protein  Negative    Negative  

 

 Urine Leukocytes  Negative    Negative  

 

 Urine Blood  1+    Negative  

 

 Urine Nitrite  Negative    Negative  

 

 Urine Bilirubin  Negative    Negative  

 

 Urine Glucose  Negative    Negative  

 

 Urine White Blood Cell  Absent    Absent  

 

 Urine Red Blood Cell  Trace(0-2/hpf)    Absent  

 

 Urine Bacteria  Absent    Absent  









 Laboratory test finding  2017  Digoxin  0.7 ng/ml  Low  0.8-2.0  









 Magnesium  1.7 mg/dL  Low  1.9-2.7  

 

 Pathologist Review  (SEE NOTE)      3

 

 Blood Culture  SEE RESULT BELOW      4









 Manual Differential  2017  Neutrophil %  17 %  Low  38-83  









 Lymphocytes %  62 %  High  25-47  

 

 Monocytes %  10 %    0-13  

 

 Eosinophils %  1 %    0-6  

 

 Basophil %  1 %    0-2  

 

 Reactive Lymph %  9 %  High  0-6  

 

 Hypochromasia  1+      

 

 Comments  (SEE NOTE)      5









 CBC Auto Diff  2017  White Blood Count  0.3 10^3/uL  Low  3.5-10.8  









 Red Blood Count  2.84 10^6/uL  Low  4.0-5.4  

 

 Hemoglobin  8.9 g/dL  Low  12.0-16.0  

 

 Hematocrit  26 %  Low  35-47  

 

 Mean Corpuscular Volume  92 fL    80-97  

 

 Mean Corpuscular Hemoglobin  31 pg    27-31  

 

 Mean Corpuscular HGB Conc  34 g/dL    31-36  

 

 Red Cell Distribution Width  14 %    10.5-15  

 

 Platelet Count  29 10^3/uL  Low  150-450  

 

 Mean Platelet Volume  8 um3    7.4-10.4  

 

 Abs Neutrophils  0.1 10^3/uL  Low  1.5-7.7  6

 

 Abs Lymphocytes  0.2 10^3/uL  Low  1.0-4.8  

 

 Abs Monocytes  0 10^3/uL    0-0.8  

 

 Abs Eosinophils  0 10^3/uL    0-0.6  

 

 Abs Basophils  0 10^3/uL    0-0.2  









 Laboratory test finding  2017  Troponin-I (TnI)  0.07 ng/mL  High  &lt;
0.04  7

 

 Comp Metabolic Panel  2017  Sodium  135 mmol/L    133-145  









 Potassium  3.7 mmol/L    3.5-5.0  

 

 Chloride  102 mmol/L    101-111  

 

 Co2 Carbon Dioxide  28 mmol/L    22-32  

 

 Anion Gap  5 mmol/L    2-11  

 

 Glucose  88 mg/dL      

 

 Blood Urea Nitrogen  21 mg/dL    6-24  

 

 Creatinine  0.83 mg/dL    0.51-0.95  

 

 BUN/Creatinine Ratio  25.3  High  8-20  

 

 Calcium  8.8 mg/dL    8.6-10.3  

 

 Total Protein  5.5 g/dL  Low  6.4-8.9  

 

 Albumin  3.5 g/dL    3.2-5.2  

 

 Globulin  2.0 g/dL    2-4  

 

 Albumin/Globulin Ratio  1.8    1-3  

 

 Total Bilirubin  0.60 mg/dL    0.2-1.0  

 

 Alkaline Phosphatase  57 U/L      

 

 Alt  10 U/L    7-52  

 

 Ast  11 U/L  Low  13-39  

 

 Egfr Non-  67.0    &gt;60  

 

 Egfr   86.2    &gt;60  8









 Laboratory test finding  2017  Partial Thrombo Time  33.1 seconds    26.0
-36.3  



     PTT        

 

 Inr/Protime  2017  Inr  0.99    0.77-1.02  9

 

 Laboratory test finding  2017  Lactic Acid  1.0 mmol/L    0.5-2.0  10

 

 Laboratory test finding  2017  Lactic Acid  1.5 mmol/L    0.5-2.0  11

 

 Laboratory test finding  2017  Magnesium  1.7 mg/dL  Low  1.9-2.7  

 

 Comp Metabolic Panel  2017  Sodium  135 mmol/L    133-145  









 Potassium  4.0 mmol/L    3.5-5.0  

 

 Chloride  99 mmol/L  Low  101-111  

 

 Co2 Carbon Dioxide  32 mmol/L    22-32  

 

 Anion Gap  4 mmol/L    2-11  

 

 Glucose  86 mg/dL      

 

 Blood Urea Nitrogen  22 mg/dL    6-24  

 

 Creatinine  0.61 mg/dL    0.51-0.95  

 

 BUN/Creatinine Ratio  36.1  High  8-20  

 

 Calcium  9.0 mg/dL    8.6-10.3  

 

 Total Protein  5.4 g/dL  Low  6.4-8.9  

 

 Albumin  3.6 g/dL    3.2-5.2  

 

 Globulin  1.8 g/dL  Low  2-4  

 

 Albumin/Globulin Ratio  2.0    1-3  

 

 Total Bilirubin  0.60 mg/dL    0.2-1.0  

 

 Alkaline Phosphatase  65 U/L      

 

 Alt  10 U/L    7-52  

 

 Ast  15 U/L    13-39  

 

 Egfr Non-  95.6    &gt;60  

 

 Egfr   123.0    &gt;60  12









 Laboratory test finding  2017  Uric Acid  5.1 mg/dL    2.3-6.6  









 LDH  132 U/L  Low  140-271  









 Basic Metabolic Panel  2017  Sodium  135 mmol/L    133-145  









 Potassium  3.9 mmol/L    3.5-5.0  

 

 Chloride  100 mmol/L  Low  101-111  

 

 Co2 Carbon Dioxide  32 mmol/L    22-32  

 

 Anion Gap  3 mmol/L    2-11  

 

 Glucose  85 mg/dL      

 

 Blood Urea Nitrogen  22 mg/dL    6-24  

 

 Creatinine  0.61 mg/dL    0.51-0.95  

 

 BUN/Creatinine Ratio  36.1  High  8-20  

 

 Calcium  9.0 mg/dL    8.6-10.3  

 

 Egfr Non-  95.6    &gt;60  

 

 Egfr   123.0    &gt;60  13









 Laboratory test  2017  Troponin-I (TnI)  0.07 ng/mL  High  &lt;0.04  14



 finding            

 

 Laboratory test  2017  Partial Thrombo Time  30.9 seconds    26.0-36.3  



 finding    PTT        









 Lactic Acid  1.6 mmol/L    0.5-2.0  15

 

 B-Type Natriuretic Peptide BNP  524 pg/mL  High    16









 Comp Metabolic Panel  2017  Sodium  139 mmol/L    133-145  









 Potassium  3.4 mmol/L  Low  3.5-5.0  

 

 Chloride  105 mmol/L    101-111  

 

 Co2 Carbon Dioxide  27 mmol/L    22-32  

 

 Anion Gap  7 mmol/L    2-11  

 

 Glucose  111 mg/dL  High    

 

 Blood Urea Nitrogen  24 mg/dL    6-24  

 

 Creatinine  0.65 mg/dL    0.51-0.95  

 

 BUN/Creatinine Ratio  36.9  High  8-20  

 

 Calcium  9.5 mg/dL    8.6-10.3  

 

 Total Protein  5.7 g/dL  Low  6.4-8.9  

 

 Albumin  3.8 g/dL    3.2-5.2  

 

 Globulin  1.9 g/dL  Low  2-4  

 

 Albumin/Globulin Ratio  2.0    1-3  

 

 Total Bilirubin  0.60 mg/dL    0.2-1.0  

 

 Alkaline Phosphatase  93 U/L      

 

 Alt  10 U/L    7-52  

 

 Ast  17 U/L    13-39  

 

 Egfr Non-  88.9    &gt;60  

 

 Egfr   114.3    &gt;60  17









 Laboratory test finding  2017  Magnesium  1.9 mg/dL    1.9-2.7  









 Creatine Kinase(CK)  12 U/L      

 

 Troponin-I (TnI)  0.07 ng/mL  High  &lt;0.04  18

 

 Digoxin  1.6 ng/ml    0.8-2.0  

 

 TSH (Thyroid Stim Horm)  0.26 mcIU/mL  Low  0.34-5.60  

 

 T3 Free  2.50 pg/mL    2.5-3.9  

 

 Free T4 (Free Thyroxine)  0.94 ng/dL    0.61-1.12  









 CBC Auto Diff  2017  White Blood Count  18.2 10^3/uL  High  3.5-10.8  









 Red Blood Count  2.94 10^6/uL  Low  4.0-5.4  

 

 Hemoglobin  9.2 g/dL  Low  12.0-16.0  

 

 Hematocrit  27 %  Low  35-47  

 

 Mean Corpuscular Volume  93 fL    80-97  

 

 Mean Corpuscular Hemoglobin  31 pg    27-31  

 

 Mean Corpuscular HGB Conc  33 g/dL    31-36  

 

 Red Cell Distribution Width  16 %  High  10.5-15  

 

 Platelet Count  53 10^3/uL  Low  150-450  

 

 Mean Platelet Volume  9 um3    7.4-10.4  

 

 Abs Neutrophils  18.2 10^3/uL  High  1.5-7.7  

 

 Abs Lymphocytes  0 10^3/uL  Low  1.0-4.8  

 

 Abs Monocytes  0 10^3/uL    0-0.8  

 

 Abs Eosinophils  0 10^3/uL    0-0.6  

 

 Abs Basophils  0 10^3/uL    0-0.2  









 Manual Differential  2017  Immature Granulocytes  2 %    0-9  









 Neutrophil %  98 %  High  38-83  

 

 Band %  2 %    0-8  

 

 Hypochromasia  1+      









 Laboratory test finding  2017  Pathologist Review  (SEE NOTE)      19

 

 Laboratory test finding  2017  Digoxin  0.5 ng/ml  Low  0.8-2.0  , 









 Magnesium  1.7 mg/dL  Low  1.9-2.7  , 









 CBC Auto Diff  2017  White Blood Count  5.5 10^3/uL    3.5-10.8  









 Red Blood Count  3.13 10^6/uL  Low  4.0-5.4  

 

 Hemoglobin  9.7 g/dL  Low  12.0-16.0  

 

 Hematocrit  29 %  Low  35-47  

 

 Mean Corpuscular Volume  93 fL    80-97  

 

 Mean Corpuscular Hemoglobin  31 pg    27-31  

 

 Mean Corpuscular HGB Conc  34 g/dL    31-36  

 

 Red Cell Distribution Width  16 %  High  10.5-15  

 

 Platelet Count  88 10^3/uL  Low  150-450  23

 

 Mean Platelet Volume  8 um3    7.4-10.4  

 

 Abs Neutrophils  4.5 10^3/uL    1.5-7.7  

 

 Abs Lymphocytes  0.5 10^3/uL  Low  1.0-4.8  

 

 Abs Monocytes  0.5 10^3/uL    0-0.8  

 

 Abs Eosinophils  0 10^3/uL    0-0.6  

 

 Abs Basophils  0 10^3/uL    0-0.2  

 

 Abs Nucleated RBC  0 10^3/uL      

 

 Granulocyte %  81.2 %    38-83  

 

 Lymphocyte %  8.8 %  Low  25-47  

 

 Monocyte %  9.6 %  High  1-9  

 

 Eosinophil %  0.1 %    0-6  

 

 Basophil %  0.3 %    0-2  

 

 Nucleated Red Blood Cells %  0.1      









 Comp Metabolic Panel  2017  Sodium  140 mmol/L    133-145  









 Potassium  3.9 mmol/L    3.5-5.0  

 

 Chloride  103 mmol/L    101-111  

 

 Co2 Carbon Dioxide  33 mmol/L  High  22-32  

 

 Anion Gap  4 mmol/L    2-11  

 

 Glucose  103 mg/dL  High    

 

 Blood Urea Nitrogen  16 mg/dL    6-24  

 

 Creatinine  0.71 mg/dL    0.51-0.95  

 

 BUN/Creatinine Ratio  22.5  High  8-20  

 

 Calcium  9.3 mg/dL    8.6-10.3  

 

 Total Protein  5.7 g/dL  Low  6.4-8.9  

 

 Albumin  3.8 g/dL    3.2-5.2  

 

 Globulin  1.9 g/dL  Low  2-4  

 

 Albumin/Globulin Ratio  2.0    1-3  

 

 Total Bilirubin  0.30 mg/dL    0.2-1.0  

 

 Alkaline Phosphatase  64 U/L      

 

 Alt  9 U/L    7-52  

 

 Ast  14 U/L    13-39  

 

 Egfr Non-  80.3    &gt;60  

 

 Egfr   103.2    &gt;60  24









 Laboratory test finding  2017  Uric Acid  5.7 mg/dL    2.3-6.6  









 LDH  103 U/L  Low  140-271  

 

 Digoxin  0.4 ng/ml  Low  0.8-2.0  









 Comp Metabolic Panel  10/29/2017  Sodium  141 mmol/L    133-145  









 Potassium  3.0 mmol/L  Low  3.5-5.0  

 

 Chloride  107 mmol/L    101-111  

 

 Co2 Carbon Dioxide  26 mmol/L    22-32  

 

 Anion Gap  8 mmol/L    2-11  

 

 Glucose  112 mg/dL  High    

 

 Blood Urea Nitrogen  38 mg/dL  High  6-24  

 

 Creatinine  0.76 mg/dL    0.51-0.95  

 

 BUN/Creatinine Ratio  50.0  High  8-20  

 

 Calcium  9.4 mg/dL    8.6-10.3  

 

 Total Protein  5.7 g/dL  Low  6.4-8.9  

 

 Albumin  3.8 g/dL    3.2-5.2  

 

 Globulin  1.9 g/dL  Low  2-4  

 

 Albumin/Globulin Ratio  2.0    1-3  

 

 Total Bilirubin  0.70 mg/dL    0.2-1.0  

 

 Alkaline Phosphatase  76 U/L      

 

 Alt  10 U/L    7-52  

 

 Ast  16 U/L    13-39  

 

 Egfr Non-  74.2    &gt;60  

 

 Egfr   95.4    &gt;60  25









 Laboratory test finding  10/29/2017  Magnesium  1.8 mg/dL  Low  1.9-2.7  









 Creatine Kinase(CK)  &lt; 10 U/L  Low    

 

 Troponin-I (TnI)  0.03 ng/mL    &lt;0.04  









 CKMB  10/29/2017  CKMB  ng/mL  2.2 ng/mL    0.6-6.3  

 

 Laboratory test  10/29/2017  TSH (Thyroid Stim  0.43 mcIU/mL    0.34-5.60  



 finding    Horm)        

 

 Laboratory test  10/29/2017  B-Type Natriuretic  581 pg/mL  High    26



 finding    Peptide BNP        

 

 Laboratory test  10/29/2017  Lactic Acid  2.2 mmol/L  High  0.5-2.0  27



 finding            









 Blood Culture  SEE RESULT BELOW      28









 CBC Auto Diff  10/29/2017  White Blood Count  5.7 10^3/uL    3.5-10.8  









 Red Blood Count  3.10 10^6/uL  Low  4.0-5.4  

 

 Hemoglobin  9.1 g/dL  Low  12.0-16.0  

 

 Hematocrit  27 %  Low  35-47  

 

 Mean Corpuscular Volume  88 fL    80-97  

 

 Mean Corpuscular Hemoglobin  29 pg    27-31  

 

 Mean Corpuscular HGB Conc  33 g/dL    31-36  

 

 Red Cell Distribution Width  22 %  High  10.5-15  

 

 Platelet Count  46 10^3/uL  Low  150-450  

 

 Mean Platelet Volume  9 um3    7.4-10.4  

 

 Abs Nucleated RBC  0 10^3/uL      

 

 Abs Neutrophils  5.3 10^3/uL    1.5-7.7  

 

 Abs Lymphocytes  0.3 10^3/uL  Low  1.0-4.8  

 

 Abs Monocytes  0.1 10^3/uL    0-0.8  

 

 Abs Eosinophils  0 10^3/uL    0-0.6  

 

 Abs Basophils  0 10^3/uL    0-0.2  









 Manual Differential  10/29/2017  Immature Granulocytes  2 %    0-9  









 Neutrophil %  92 %  High  38-83  

 

 Band %  2 %    0-8  

 

 Lymphocytes %  5 %  Low  25-47  

 

 Monocytes %  1 %    0-13  

 

 Toxic Granulation  1+      

 

 Hypochromasia  1+      

 

 RBC Inclusions  Dohle Bodies      









 Laboratory test finding  10/29/2017  Pathologist Review  (SEE NOTE)      29

 

 Laboratory test finding  10/09/2017  LDH  176 U/L    140-271  

 

 Comp Metabolic Panel  10/09/2017  Sodium  138 mmol/L    133-145  









 Potassium  3.5 mmol/L    3.5-5.0  

 

 Chloride  104 mmol/L    101-111  

 

 Co2 Carbon Dioxide  29 mmol/L    22-32  

 

 Anion Gap  5 mmol/L    2-11  

 

 Glucose  114 mg/dL  High    

 

 Blood Urea Nitrogen  25 mg/dL  High  6-24  

 

 Creatinine  0.62 mg/dL    0.51-0.95  

 

 BUN/Creatinine Ratio  40.3  High  8-20  

 

 Calcium  8.7 mg/dL    8.6-10.3  

 

 Total Protein  5.3 g/dL  Low  6.4-8.9  

 

 Albumin  3.6 g/dL    3.2-5.2  

 

 Globulin  1.7 g/dL  Low  2-4  

 

 Albumin/Globulin Ratio  2.1    1-3  

 

 Total Bilirubin  0.80 mg/dL    0.2-1.0  

 

 Alkaline Phosphatase  64 U/L      

 

 Alt  10 U/L    7-52  

 

 Ast  14 U/L    13-39  

 

 Egfr Non-  93.8    &gt;60  

 

 Egfr   120.7    &gt;60  30









 Laboratory test finding  10/09/2017  Uric Acid  5.0 mg/dL    2.3-6.6  

 

 CBC Auto Diff  10/09/2017  White Blood Count  1.4 10^3/uL  Low  3.5-10.8  









 Red Blood Count  3.64 10^6/uL  Low  4.0-5.4  

 

 Hemoglobin  10.0 g/dL  Low  12.0-16.0  

 

 Hematocrit  30 %  Low  35-47  

 

 Mean Corpuscular Volume  83 fL    80-97  

 

 Mean Corpuscular Hemoglobin  27 pg    27-31  

 

 Mean Corpuscular HGB Conc  33 g/dL    31-36  

 

 Red Cell Distribution Width  21 %  High  10.5-15  

 

 Platelet Count  62 10^3/uL  Low  150-450  

 

 Mean Platelet Volume  9 um3    7.4-10.4  

 

 Abs Neutrophils  1.11 10^3/uL  Low  1.5-7.7  

 

 Abs Lymphocytes  0.21 10^3/uL  Low  1.0-4.8  

 

 Abs Monocytes  0.07 10^3/uL    0-0.8  

 

 Abs Eosinophils  0.01 10^3/uL    0-0.6  

 

 Abs Basophils  0 10^3/uL    0-0.2  

 

 Abs Nucleated RBC  0 10^3/uL      









 Manual Differential  10/09/2017  Immature Granulocytes  2 %    0-9  









 Neutrophil %  77 %    38-83  

 

 Band %  2 %    0-8  

 

 Lymphocytes %  14 %  Low  25-47  

 

 Monocytes %  5 %    0-13  

 

 Eosinophils %  1 %    0-6  

 

 Reactive Lymph %  1 %    0-6  

 

 RBC Morphology  Normal    Normal  









 Laboratory test finding  10/09/2017  Pathologist Review  (SEE NOTE)      31

 

 Urinalysis Profile  2017  Urine Color  Yellow      









 Urine Appearance  Clear      

 

 Urine Specific Gravity  1.014    1.010-1.030  

 

 Urine pH  6.0    5-9  

 

 Urine Urobilinogen  Negative    Negative  

 

 Urine Ketones  2+    Negative  

 

 Urine Protein  Negative    Negative  

 

 Urine Leukocytes  Negative    Negative  

 

 Urine Blood  1+    Negative  

 

 Urine Nitrite  Positive    Negative  

 

 Urine Bilirubin  Negative    Negative  

 

 Urine Glucose  Negative    Negative  

 

 Urine White Blood Cell  Trace(0-5/hpf)    Absent  

 

 Urine Red Blood Cell  Trace(0-2/hpf)    Absent  

 

 Urine Bacteria  Absent    Absent  

 

 Urine Squamous Epithelial Cell  Present    Absent  









 Urine Culture And  2017  Urine Culture  SEE RESULT BELOW      32



 Sensitivities            

 

 Laboratory test finding  2017  Lactic Acid  0.8 mmol/L    0.5-2.0  33

 

 CBC Auto Diff  2017  White Blood Count  0.2 10^3/uL  Low  3.5-10.8  









 Red Blood Count  3.98 10^6/uL  Low  4.0-5.4  

 

 Hemoglobin  10.5 g/dL  Low  12.0-16.0  

 

 Hematocrit  32 %  Low  35-47  

 

 Mean Corpuscular Volume  79 fL  Low  80-97  

 

 Mean Corpuscular Hemoglobin  26 pg  Low  27-31  

 

 Mean Corpuscular HGB Conc  33 g/dL    31-36  

 

 Red Cell Distribution Width  14 %    10.5-15  

 

 Platelet Count  52 10^3/uL  Low  150-450  

 

 Mean Platelet Volume  8 um3    7.4-10.4  

 

 Abs Neutrophils  0.1 10^3/uL  Low  1.5-7.7  34

 

 Abs Lymphocytes  0.1 10^3/uL  Low  1.0-4.8  

 

 Abs Monocytes  0.1 10^3/uL    0-0.8  

 

 Abs Eosinophils  0 10^3/uL    0-0.6  

 

 Abs Basophils  0 10^3/uL    0-0.2  

 

 Abs Nucleated RBC  0 10^3/uL      

 

 Granulocyte %  24.5 %  Low  38-83  

 

 Lymphocyte %  31.0 %    25-47  

 

 Monocyte %  34.2 %  High  1-9  

 

 Eosinophil %  9.3 %  High  0-6  

 

 Basophil %  1.0 %    0-2  

 

 Nucleated Red Blood Cells %  0.3      









 Laboratory test finding  2017  Partial Thrombo Time  58.4 seconds  High  
26.0-36.3  



     PTT        

 

 Comp Metabolic Panel  2017  Sodium  133 mmol/L    133-145  









 Potassium  3.8 mmol/L    3.5-5.0  

 

 Chloride  99 mmol/L  Low  101-111  

 

 Co2 Carbon Dioxide  26 mmol/L    22-32  

 

 Anion Gap  8 mmol/L    2-11  

 

 Glucose  94 mg/dL      

 

 Blood Urea Nitrogen  18 mg/dL    6-24  

 

 Creatinine  0.63 mg/dL    0.51-0.95  

 

 BUN/Creatinine Ratio  28.6  High  8-20  

 

 Calcium  8.9 mg/dL    8.6-10.3  

 

 Total Protein  5.0 g/dL  Low  6.4-8.9  

 

 Albumin  3.1 g/dL  Low  3.2-5.2  

 

 Globulin  1.9 g/dL  Low  2-4  

 

 Albumin/Globulin Ratio  1.6    1-3  

 

 Total Bilirubin  1.20 mg/dL  High  0.2-1.0  

 

 Alkaline Phosphatase  47 U/L      

 

 Alt  9 U/L    7-52  

 

 Ast  10 U/L  Low  13-39  

 

 Egfr Non-  92.1    &gt;60  

 

 Egfr   118.5    &gt;60  35









 Laboratory test finding  2017  Lipase  &lt; 10 U/L  Low  11.0-82.0  









 Troponin-I (TnI)  0.00 ng/mL    &lt;0.04  









 Urinalysis Profile  2017  Urine Color  Yellow      









 Urine Appearance  Clear      

 

 Urine Specific Gravity  1.048  High  1.010-1.030  

 

 Urine pH  5.0    5-9  

 

 Urine Urobilinogen  Negative    Negative  

 

 Urine Ketones  Trace    Negative  

 

 Urine Protein  Negative    Negative  

 

 Urine Leukocytes  Negative    Negative  

 

 Urine Blood  Negative    Negative  

 

 Urine Nitrite  Negative    Negative  

 

 Urine Bilirubin  Negative    Negative  

 

 Urine Glucose  Negative    Negative  









 CBC Auto Diff  2017  White Blood Count  6.7 10^3/uL    3.5-10.8  









 Red Blood Count  4.55 10^6/uL    4.0-5.4  

 

 Hemoglobin  12.0 g/dL    12.0-16.0  

 

 Hematocrit  37 %    35-47  

 

 Mean Corpuscular Volume  81 fL    80-97  

 

 Mean Corpuscular Hemoglobin  26 pg  Low  27-31  

 

 Mean Corpuscular HGB Conc  32 g/dL    31-36  

 

 Red Cell Distribution Width  14 %    10.5-15  

 

 Platelet Count  291 10^3/uL    150-450  

 

 Mean Platelet Volume  7 um3  Low  7.4-10.4  

 

 Abs Neutrophils  5.6 10^3/uL    1.5-7.7  

 

 Abs Lymphocytes  0.3 10^3/uL  Low  1.0-4.8  

 

 Abs Monocytes  0.7 10^3/uL    0-0.8  

 

 Abs Eosinophils  0 10^3/uL    0-0.6  

 

 Abs Basophils  0 10^3/uL    0-0.2  

 

 Abs Nucleated RBC  0 10^3/uL      

 

 Granulocyte %  84.1 %  High  38-83  

 

 Lymphocyte %  5.0 %  Low  25-47  

 

 Monocyte %  10.0 %  High  1-9  

 

 Eosinophil %  0.5 %    0-6  

 

 Basophil %  0.4 %    0-2  

 

 Nucleated Red Blood Cells %  0.1      









 Laboratory test finding  2017  Lactic Acid  0.8 mmol/L    0.5-2.0  36

 

 Inr/Protime  2017  Inr  0.95    0.89-1.11  

 

 Laboratory test finding  2017  Partial Thrombo Time  31.7 seconds    26.0
-36.3  



     PTT        

 

 Comp Metabolic Panel  2017  Sodium  137 mmol/L    133-145  









 Potassium  4.0 mmol/L    3.5-5.0  

 

 Chloride  103 mmol/L    101-111  

 

 Co2 Carbon Dioxide  26 mmol/L    22-32  

 

 Anion Gap  8 mmol/L    2-11  

 

 Glucose  89 mg/dL      

 

 Blood Urea Nitrogen  21 mg/dL    6-24  

 

 Creatinine  1.02 mg/dL  High  0.51-0.95  

 

 BUN/Creatinine Ratio  20.6  High  8-20  

 

 Calcium  9.1 mg/dL    8.6-10.3  

 

 Total Protein  5.9 g/dL  Low  6.4-8.9  

 

 Albumin  3.1 g/dL  Low  3.2-5.2  

 

 Globulin  2.8 g/dL    2-4  

 

 Albumin/Globulin Ratio  1.1    1-3  

 

 Total Bilirubin  0.50 mg/dL    0.2-1.0  

 

 Alkaline Phosphatase  62 U/L      

 

 Alt  14 U/L    7-52  

 

 Ast  22 U/L    13-39  

 

 Egfr Non-  52.8    &gt;60  

 

 Egfr   67.9    &gt;60  37









 Laboratory test finding  2017  Magnesium  1.9 mg/dL    1.9-2.7  









 Lipase  13 U/L    11.0-82.0  

 

 C Reactive Protein  20.35 mg/L  High  &lt; 5.00  38

 

 Troponin-I (TnI)  0.01 ng/mL    &lt;0.04  

 

 TSH (Thyroid Stim Horm)  1.88 mcIU/mL    0.34-5.60  

 

 Folic Acid (Folate)  10.14 ng/mL    &gt;3.99  

 

 Vitamin B12  407 pg/mL    180-914  39









 CBC Auto Diff  2017  White Blood Count  9.7 10^3/uL    3.5-10.8  









 Red Blood Count  4.51 10^6/uL    4.0-5.4  

 

 Hemoglobin  12.3 g/dL    12.0-16.0  

 

 Hematocrit  38 %    35-47  

 

 Mean Corpuscular Volume  83 fL    80-97  

 

 Mean Corpuscular Hemoglobin  27 pg    27-31  

 

 Mean Corpuscular HGB Conc  33 g/dL    31-36  

 

 Red Cell Distribution Width  13 %    10.5-15  

 

 Platelet Count  182 10^3/uL    150-450  

 

 Mean Platelet Volume  9 um3    7.4-10.4  

 

 Abs Neutrophils  7.9 10^3/uL  High  1.5-7.7  

 

 Abs Lymphocytes  0.4 10^3/uL  Low  1.0-4.8  

 

 Abs Monocytes  1.3 10^3/uL  High  0-0.8  

 

 Abs Eosinophils  0 10^3/uL    0-0.6  

 

 Abs Basophils  0 10^3/uL    0-0.2  

 

 Abs Nucleated RBC  0 10^3/uL      

 

 Granulocyte %  81.7 %    38-83  

 

 Lymphocyte %  4.3 %  Low  25-47  

 

 Monocyte %  13.5 %  High  1-9  

 

 Eosinophil %  0.2 %    0-6  

 

 Basophil %  0.3 %    0-2  

 

 Nucleated Red Blood Cells %  0      









 Laboratory test finding  2017  B-Type Natriuretic Peptide  45 pg/mL      
40



     BNP        

 

 Comp Metabolic Panel  2017  Sodium  133 mmol/L    133-145  









 Potassium  4.0 mmol/L    3.5-5.0  

 

 Chloride  101 mmol/L    101-111  

 

 Co2 Carbon Dioxide  21 mmol/L  Low  22-32  

 

 Anion Gap  11 mmol/L    2-11  

 

 Glucose  106 mg/dL  High    

 

 Blood Urea Nitrogen  25 mg/dL  High  6-24  

 

 Creatinine  0.95 mg/dL    0.51-0.95  

 

 BUN/Creatinine Ratio  26.3  High  8-20  

 

 Calcium  9.5 mg/dL    8.6-10.3  

 

 Total Protein  6.4 g/dL    6.4-8.9  

 

 Albumin  3.7 g/dL    3.2-5.2  

 

 Globulin  2.7 g/dL    2-4  

 

 Albumin/Globulin Ratio  1.4    1-3  

 

 Total Bilirubin  1.30 mg/dL  High  0.2-1.0  

 

 Alkaline Phosphatase  61 U/L      

 

 Alt  9 U/L    7-52  

 

 Ast  22 U/L    13-39  

 

 Egfr Non-  57.3    &gt;60  

 

 Egfr   73.8    &gt;60  41









 Laboratory test  2017  Magnesium  1.7 mg/dL  Low  1.9-2.7  



 finding            

 

 Inr/Protime  2017  Inr  1.02    0.89-1.11  

 

 Laboratory test  2017  D Dimer Quantitative  757 ng/mL  High  Less Than 
230  42



 finding            









 Lactic Acid  0.7 mmol/L    0.5-2.0  43









 Laboratory test finding  2017  Vitamin D Total 25(Oh)  15.2 ng/mL  Low  
30-50  

 

 Pthi  2017  Calcium (PTH Intact)  10.0 mg/dL    8.6-10.3  









 PTH Intact  1.6 pmol/L    1.3-9.3  









 Laboratory test finding  2017  Glucose  86 mg/dL      









 Hemoglobin A1c (Glyco HGB)  5.6 %    Less than 6.0  44









 Comp Metabolic Panel  2017  Sodium  140 mmol/L    133-145  









 Potassium  4.0 mmol/L    3.5-5.0  

 

 Chloride  105 mmol/L    101-111  

 

 Co2 Carbon Dioxide  26 mmol/L    22-32  

 

 Anion Gap  9 mmol/L    2-11  

 

 Glucose  85 mg/dL      

 

 Blood Urea Nitrogen  24 mg/dL    6-24  

 

 Creatinine  1.04 mg/dL  High  0.51-0.95  

 

 BUN/Creatinine Ratio  23.1  High  8-20  

 

 Calcium  10.0 mg/dL    8.6-10.3  

 

 Total Protein  6.2 g/dL  Low  6.4-8.9  

 

 Albumin  4.0 g/dL    3.2-5.2  

 

 Globulin  2.2 g/dL    2-4  

 

 Albumin/Globulin Ratio  1.8    1-3  

 

 Total Bilirubin  0.80 mg/dL    0.2-1.0  

 

 Alkaline Phosphatase  64 U/L      

 

 Alt  9 U/L    7-52  

 

 Ast  23 U/L    13-39  

 

 Egfr Non-  51.7    &gt;60  

 

 Egfr   66.4    &gt;60  45









 Laboratory test finding  2017  TSH (Thyroid Stim Horm)  2.29 mcIU/mL    
0.34-5.60  

 

 CBC Auto Diff  2017  White Blood Count  7.6 10^3/uL    3.5-10.8  









 Red Blood Count  4.78 10^6/uL    4.0-5.4  

 

 Hemoglobin  13.1 g/dL    12.0-16.0  

 

 Hematocrit  40 %    35-47  

 

 Mean Corpuscular Volume  84 fL    80-97  

 

 Mean Corpuscular Hemoglobin  27 pg    27-31  

 

 Mean Corpuscular HGB Conc  32 g/dL    31-36  

 

 Red Cell Distribution Width  13 %    10.5-15  

 

 Platelet Count  180 10^3/uL    150-450  

 

 Mean Platelet Volume  9 um3    7.4-10.4  

 

 Abs Neutrophils  5.9 10^3/uL    1.5-7.7  

 

 Abs Lymphocytes  0.7 10^3/uL  Low  1.0-4.8  

 

 Abs Monocytes  0.9 10^3/uL  High  0-0.8  

 

 Abs Eosinophils  0 10^3/uL    0-0.6  

 

 Abs Basophils  0.1 10^3/uL    0-0.2  

 

 Abs Nucleated RBC  0.01 10^3/uL      

 

 Granulocyte %  77.4 %    38-83  

 

 Lymphocyte %  9.1 %  Low  25-47  

 

 Monocyte %  12.3 %  High  1-9  

 

 Eosinophil %  0.5 %    0-6  

 

 Basophil %  0.7 %    0-2  

 

 Nucleated Red Blood Cells %  0.1      









 Laboratory test finding  2017   (Ovarian  1893.7 U/mL  High  0-35  
46



     Cancer Ag)        

 

 Order  2017  EKG  &lt;pending&gt;      

 

 Urine Culture And  2017  Urine Culture  SEE RESULT BELOW      47



 Sensitivities            

 

 Ua Routine  2017  Ua Specific Gravity  1.015      









 Ua PH  5      

 

 Ua Color  cloudy      

 

 Ua Appera  red in color      

 

 Ua WBC  ++      

 

 Ua Protein  +++      

 

 Ua Glucose  noraml      

 

 Ua Ketones  negative      

 

 Ua Bilirubin  negative      

 

 Ua Urobilinogen  normal      

 

 Ua Nitrite  positive      

 

 Ua Occult Blood  large      









 Urine Culture And  2016  Urine Culture  SEE RESULT BELOW      48



 Sensitivities            

 

 Ua Routine  2016  Ua Specific Gravity  1.020      









 Ua PH  5      

 

 Ua Color  yellow      

 

 Ua Appera  cloudy      

 

 Ua WBC  small      

 

 Ua Protein  neg      

 

 Ua Glucose  neg      

 

 Ua Ketones  neg      

 

 Ua Bilirubin  neg      

 

 Ua Urobilinogen  neg      

 

 Ua Nitrite  neg      

 

 Ua Occult Blood  small      









 Laboratory test finding  2016  Lyme Disease Serology  Positive    
Negative  49

 

 Lyme Western Blot  2016  Lyme Disease IgG Ab WB  Positive    Negative  









 Lyme Disease IgG Bands Present  See Comment kDa      50

 

 Lyme Disease IgM Ab WB  Positive    Negative  

 

 Lyme Disease IgM Bands Present  p41, p39, kDa      

 

 Lyme Disease Interpretation  See Comment      51









 Lipid Profile (Trig/Chol/HDL)  2016  Triglycerides  67 mg/dL      52









 Cholesterol  179 mg/dL      53

 

 HDL Cholesterol  34.9 mg/dL      54

 

 LDL Cholesterol  131 mg/dL      55









 Basic Metabolic Panel  2016  Sodium  138 mmol/L    133-145  









 Potassium  4.2 mmol/L    3.5-5.0  

 

 Chloride  104 mmol/L    101-111  

 

 Co2 Carbon Dioxide  30 mmol/L    22-32  

 

 Anion Gap  4 mmol/L    2-11  

 

 Glucose  106 mg/dL  High    

 

 Blood Urea Nitrogen  24 mg/dL    6-24  

 

 Creatinine  0.91 mg/dL    0.51-0.95  

 

 BUN/Creatinine Ratio  26.4  High  8-20  

 

 Calcium  9.8 mg/dL    8.6-10.3  

 

 Egfr Non-  60.4    &gt;60  

 

 Egfr   77.7    &gt;60  56









 Laboratory test finding  2016  C Reactive Protein  5.03 mg/L  High  &lt; 
5.00  57

 

 Lyme Western Blot  2016  Lyme Disease IgG Ab WB  Negative    Negative  









 Lyme Disease IgG Bands Present  p66, p41, p23, kDa      

 

 Lyme Disease IgM Ab WB  Positive    Negative  

 

 Lyme Disease IgM Bands Present  p41, p23, kDa      

 

 Lyme Disease Interpretation  See Comment      58









 Laboratory test finding  2016  Erythrocyte Sed Rate  29 mm/Hr    0-40  









 Lyme Disease Serology  Positive    Negative  59









 CBC Auto Diff  2016  White Blood Count  4.7 10^3/uL    3.5-10.8  









 Red Blood Count  4.92 10^6/uL    4.0-5.4  

 

 Hemoglobin  13.2 g/dL    12.0-16.0  

 

 Hematocrit  40 %    35-47  

 

 Mean Corpuscular Volume  82 fL    80-97  

 

 Mean Corpuscular Hemoglobin  27 pg    27-31  

 

 Mean Corpuscular HGB Conc  33 g/dL    31-36  

 

 Red Cell Distribution Width  14 %    10.5-15  

 

 Platelet Count  137 10^3/uL  Low  150-450  

 

 Mean Platelet Volume  8 um3    7.4-10.4  

 

 Abs Neutrophils  3.5 10^3/uL    1.5-7.7  

 

 Abs Lymphocytes  0.5 10^3/uL  Low  1.0-4.8  

 

 Abs Monocytes  0.7 10^3/uL    0-0.8  

 

 Abs Eosinophils  0 10^3/uL    0-0.6  

 

 Abs Basophils  0 10^3/uL    0-0.2  

 

 Abs Nucleated RBC  0 10^3/uL      

 

 Granulocyte %  74.4 %    38-83  

 

 Lymphocyte %  9.9 %  Low  25-47  

 

 Monocyte %  14.6 %  High  1-9  

 

 Eosinophil %  0.5 %    0-6  

 

 Basophil %  0.6 %    0-2  

 

 Nucleated Red Blood Cells %  0.1      









 1  *******Because ethnic data is not always readily available,



   this report includes an eGFR for both -Americans and



   non- Americans.****



   The National Kidney Disease Education Program (NKDEP) does



   not endorse the use of the MDRD equation for patients that



   are not between the ages of 18 and 70, are pregnant, have



   extremes of body size, muscle mass, or nutritional status,



   or are non- or non-.



   According to the National Kid[employer] Foundation, irrespective of



   diagnosis, the stage of the disease is based on the level of



   kidney function:



   Stage Description                      GFR(mL/min/1.73 m(2))



   1     Kidney damage with normal or decreased GFR       90



   2     Kidney damage with mild decrease in GFR          60-89



   3     Moderate decrease in GFR                         30-59



   4     Severe decrease in GFR                           15-29



   5     Kidney failure                       &lt;15 (or dialysis)

 

 2  *******Because ethnic data is not always readily available,



   this report includes an eGFR for both -Americans and



   non- Americans.****



   The National Kidney Disease Education Program (NKDEP) does



   not endorse the use of the MDRD equation for patients that



   are not between the ages of 18 and 70, are pregnant, have



   extremes of body size, muscle mass, or nutritional status,



   or are non- or non-.



   According to the National Kidney Foundation, irrespective of



   diagnosis, the stage of the disease is based on the level of



   kidney function:



   Stage Description                      GFR(mL/min/1.73 m(2))



   1     Kidney damage with normal or decreased GFR       90



   2     Kidney damage with mild decrease in GFR          60-89



   3     Moderate decrease in GFR                         30-59



   4     Severe decrease in GFR                           15-29



   5     Kidney failure                       &lt;15 (or dialysis)

 

 3  Pancytopenia with absolute neutropenia, normocytic anemia,



   and marked thrombocytopenia.  No evidence of a hemolytic



   process.



   



   Reviewed by Larissa Lara MD

 

 4  SEE RESULT BELOW



   -----------------------------------------------------------------------------
---------------



   Name:  SUELLEN PATEL                 : 1940    Attend Dr: Vero Person MD



   Acct:  [id number]  Unit: [id number]  AGE: 77            Location:  [place] [address]



   Re17                        SEX: F             Status:    ADM IN



   -----------------------------------------------------------------------------
---------------



   



   SPEC: [id number]         PARRISH:       [facility] DR: Barber Odom MD



   REQ:  [id number]              RECD:   



   STATUS: RES              OTHR DR: Prachi Hansen MD



   _



   SOURCE: BLOOD,VENO     SPDESC:



   ORDERED:  Blood Cult



   COMMENTS: COLLECTED FROM LAC



   



   -----------------------------------------------------------------------------
---------------



   Procedure                         Result                         Reported   
        Site



   -----------------------------------------------------------------------------
---------------



   Aerobic Culture Bottle  Preliminary                              17-
5      ML



   No Growth Day 1



   



   Anaerobic Culture Bottle  Preliminary                            17-
1425      ML



   No Growth Day 1



   



   -----------------------------------------------------------------------------
---------------



   * ML - MAIN LAB ([facility])



   .



   



   



   



   



   



   



   



   



   



   



   



   



   



   



   



   



   



   



   



   



   



   



   



   ** END OF REPORT **



   



   * ML=Testing performed at Main Lab



   DEPARTMENT OF PATHOLOGY,  [address] [place] [address]



   Phone # [phone]      Fax #[phone]



   Luis Jung M.D. Director     [facility] # [id number]

 

 5  Differential performed on buffy coat.

 

 6  Verbal to [id number] by [id number] at 1534 on 17.



   Results read back accurately.

 

 7  Result TnIDx:0.07 Called to [id number] at: 14:46:42 by:[id number] Read back by:
[id number]

 

 8  *******Because ethnic data is not always readily available,



   this report includes an eGFR for both -Americans and



   non- Americans.****



   The National Kidney Disease Education Program (NKDEP) does



   not endorse the use of the MDRD equation for patients that



   are not between the ages of 18 and 70, are pregnant, have



   extremes of body size, muscle mass, or nutritional status,



   or are non- or non-.



   According to the National Kid[employer], irrespective of



   diagnosis, the stage of the disease is based on the level of



   kidney function:



   Stage Description                      GFR(mL/min/1.73 m(2))



   1     Kidney damage with normal or decreased GFR       90



   2     Kidney damage with mild decrease in GFR          60-89



   3     Moderate decrease in GFR                         30-59



   4     Severe decrease in GFR                           15-29



   5     Kidney failure                       &lt;15 (or dialysis)

 

 9  Please note the change in INR reference range effective



   17.

 

 10  [facility] Severe Sepsis and Septic Shock Management Bundle Measure



   requires all lactic acids initially measuring &gt;2.0 mmol/L be



   repeated.

 

 11  [facility] [facility] Severe Sepsis and Septic Shock Management Bundle Measure



   requires all lactic acids initially measuring &gt;2.0 mmol/L be



   repeated.

 

 12  *******Because ethnic data is not always readily available,



   this report includes an eGFR for both -Americans and



   non- Americans.****



   The National Kidney Disease Education Program (NKDEP) does



   not endorse the use of the MDRD equation for patients that



   are not between the ages of 18 and 70, are pregnant, have



   extremes of body size, muscle mass, or nutritional status,



   or are non- or non-.



   According to the National Kidney Foundation, irrespective of



   diagnosis, the stage of the disease is based on the level of



   kidney function:



   Stage Description                      GFR(mL/min/1.73 m(2))



   1     Kidney damage with normal or decreased GFR       90



   2     Kidney damage with mild decrease in GFR          60-89



   3     Moderate decrease in GFR                         30-59



   4     Severe decrease in GFR                           15-29



   5     Kidney failure                       &lt;15 (or dialysis)

 

 13  *******Because ethnic data is not always readily available,



   this report includes an eGFR for both -Americans and



   non- Americans.****



   The National Kidney Disease Education Program (NKDEP) does



   not endorse the use of the MDRD equation for patients that



   are not between the ages of 18 and 70, are pregnant, have



   extremes of body size, muscle mass, or nutritional status,



   or are non- or non-.



   According to the National Kidney Foundation, irrespective of



   diagnosis, the stage of the disease is based on the level of



   kidney function:



   Stage Description                      GFR(mL/min/1.73 m(2))



   1     Kidney damage with normal or decreased GFR       90



   2     Kidney damage with mild decrease in GFR          60-89



   3     Moderate decrease in GFR                         30-59



   4     Severe decrease in GFR                           15-29



   5     Kidney failure                       &lt;15 (or dialysis)

 

 14  Result TnIDx:0.07 Called to [id number] at: 14:50:10 by:[id number] Read back by:
[id number]

 

 15  [facility] Severe Sepsis and Septic Shock Management Bundle Measure



   requires all lactic acids initially measuring &gt;2.0 mmol/L be



   repeated.

 

 16  &gt;100 to &lt;200 pg/mL: likely compensated congestive heart



   failure (CHF)



   200 to 400 pg/mL: likely moderate CHF



   &gt;400 pg/mL: likely moderate to severe CHF

 

 17  *******Because ethnic data is not always readily available,



   this report includes an eGFR for both -Americans and



   non- Americans.****



   The National Kidney Disease Education Program (NKDEP) does



   not endorse the use of the MDRD equation for patients that



   are not between the ages of 18 and 70, are pregnant, have



   extremes of body size, muscle mass, or nutritional status,



   or are non- or non-.



   According to the National Kidney Foundation, irrespective of



   diagnosis, the stage of the disease is based on the level of



   kidney function:



   Stage Description                      GFR(mL/min/1.73 m(2))



   1     Kidney damage with normal or decreased GFR       90



   2     Kidney damage with mild decrease in GFR          60-89



   3     Moderate decrease in GFR                         30-59



   4     Severe decrease in GFR                           15-29



   5     Kidney failure                       &lt;15 (or dialysis)

 

 18  Result TnIDx:0.07 Called to [id number] at: 10:48:09 by:[id number] Read back by:
[id number]

 

 19  Leukocytosis with absolute neutrophilia and moderate



   thrombocytopenia noted.  Reactive/inflammatory process is



   favored.  Additional studies should be considered as



   clinically warranted.

 

 20  [id number]

 

 21  [id number]

 

 22  [id number]

 

 23  Consistent with previous results on 17.

 

 24  *******Because ethnic data is not always readily available,



   this report includes an eGFR for both -Americans and



   non- Americans.****



   The National Kidney Disease Education Program (NKDEP) does



   not endorse the use of the MDRD equation for patients that



   are not between the ages of 18 and 70, are pregnant, have



   extremes of body size, muscle mass, or nutritional status,



   or are non- or non-.



   According to the National Kidney Foundation, irrespective of



   diagnosis, the stage of the disease is based on the level of



   kidney function:



   Stage Description                      GFR(mL/min/1.73 m(2))



   1     Kidney damage with normal or decreased GFR       90



   2     Kidney damage with mild decrease in GFR          60-89



   3     Moderate decrease in GFR                         30-59



   4     Severe decrease in GFR                           15-29



   5     Kidney failure                       &lt;15 (or dialysis)

 

 25  *******Because ethnic data is not always readily available,



   this report includes an eGFR for both -Americans and



   non- Americans.****



   The National Kidney Disease Education Program (NKDEP) does



   not endorse the use of the MDRD equation for patients that



   are not between the ages of 18 and 70, are pregnant, have



   extremes of body size, muscle mass, or nutritional status,



   or are non- or non-.



   According to the National Kidney Foundation, irrespective of



   diagnosis, the stage of the disease is based on the level of



   kidney function:



   Stage Description                      GFR(mL/min/1.73 m(2))



   1     Kidney damage with normal or decreased GFR       90



   2     Kidney damage with mild decrease in GFR          60-89



   3     Moderate decrease in GFR                         30-59



   4     Severe decrease in GFR                           15-29



   5     Kidney failure                       &lt;15 (or dialysis)

 

 26  &gt;100 to &lt;200 pg/mL: likely compensated congestive heart



   failure (CHF)



   200 to 400 pg/mL: likely moderate CHF



   &gt;400 pg/mL: likely moderate to severe CHF

 

 27  Critical Result LACT:2.2 Called to [id number] at: 08:48:48 by:[id number] Read 
back



   by:[id number]



   [facility] Severe Sepsis and Septic Shock Management Bundle Measure



   requires all lactic acids initially measuring &gt;2.0 mmol/L be



   repeated.

 

 28  SEE RESULT BELOW



   -----------------------------------------------------------------------------
---------------



   Name:  SUELLEN PATEL                 : 1940    Attend Dr: Lukas Chicas MD



   Acct:  [id number]  Unit: [id number]  AGE: 77            Location:  [place] [address]



   Reg:   10/29/17                        SEX: F             Status:    ADM IN



   -----------------------------------------------------------------------------
---------------



   



   SPEC: [id number]         PARRISH:   10/29/    [facility] DR: Amos Garland MD



   REQ:  [id number]              RECD:   10/29/



   STATUS: RES              OTHR DR: Prachi Hansen MD



   _



   SOURCE: BLOOD,VENO     SPDESC:



   ORDERED:  Blood Cult



   COMMENTS: Patient is On Antibiotics? NO



   



   -----------------------------------------------------------------------------
---------------



   Procedure                         Result                         Reported   
        Site



   -----------------------------------------------------------------------------
---------------



   Aerobic Culture Bottle  Preliminary                              10/30/17-
0826      ML



   No Growth Day 1



   



   Anaerobic Culture Bottle  Preliminary                            10/30/17-
0826      ML



   No Growth Day 1



   



   -----------------------------------------------------------------------------
---------------



   * ML - MAIN LAB ([facility])



   .



   



   



   



   



   



   



   



   



   



   



   



   



   



   



   



   



   



   



   



   



   



   



   



   ** END OF REPORT **



   



   * ML=Testing performed at Main Lab



   DEPARTMENT OF PATHOLOGY,  [address] [place] [address]



   Phone # [phone]      Fax #[phone]



   Luis Jung M.D. Director     [facility] # [id number]

 

 29  Progressive normocytic anemia with thrombocytopenia noted.



   No schistocytes or blasts are identified.  Additional



   studies as clinically warranted.



   



   



   Reviewed by Dr. Jung

 

 30  *******Because ethnic data is not always readily available,



   this report includes an eGFR for both -Americans and



   non- Americans.****



   The National Kidney Disease Education Program (NKDEP) does



   not endorse the use of the MDRD equation for patients that



   are not between the ages of 18 and 70, are pregnant, have



   extremes of body size, muscle mass, or nutritional status,



   or are non- or non-.



   According to the National Kidney Foundation, irrespective of



   diagnosis, the stage of the disease is based on the level of



   kidney function:



   Stage Description                      GFR(mL/min/1.73 m(2))



   1     Kidney damage with normal or decreased GFR       90



   2     Kidney damage with mild decrease in GFR          60-89



   3     Moderate decrease in GFR                         30-59



   4     Severe decrease in GFR                           15-29



   5     Kidney failure                       &lt;15 (or dialysis)

 

 31  Pancytopenia with absolute neutropenia, normocytic anemia,



   and moderate thrombocytopenia.  No evidence of a hemolytic



   process.



   



   Reviewed by Larissa Lara MD

 

 32  SEE RESULT BELOW



   -----------------------------------------------------------------------------
---------------



   Name:  JORGESUELLEN M                 : 1940    Attend Dr: Paramjit Trujillo MD



   Acct:  [id number]  Unit: [id number]  AGE: 77            Location:  [place] [address]



   Re17                        SEX: F             Status:    ADM Judit



   -----------------------------------------------------------------------------
---------------



   



   SPEC: [id number]         PARRISH:       [facility] DR: Emmanuel Brown MD



   REQ:  [id number]              RECD:   



   STATUS: COMP             BETHHR DR: Prachi Hansen MD



   _



   SOURCE: URINE          SPDESC:



   ORDERED:  Urine Culture



   



   -----------------------------------------------------------------------------
---------------



   Procedure                         Result                         Reported   
        Site



   -----------------------------------------------------------------------------
---------------



   Urine Culture  Final                                             17-
0852      ML



   



   No growth of clinically significant organisms



   



   -----------------------------------------------------------------------------
---------------



   * ML - MAIN LAB ([facility])



   .



   



   



   



   



   



   



   



   



   



   



   



   



   



   



   



   



   



   



   



   



   



   



   



   



   



   



   ** END OF REPORT **



   



   * ML=Testing performed at Main Lab



   DEPARTMENT OF PATHOLOGY,  [address] [place] [address]



   Phone # [phone]      Fax #[phone]



   Luis Jung M.D. Director     [facility] # [id number]

 

 33  [facility] Severe Sepsis and Septic Shock Management Bundle Measure



   requires all lactic acids initially measuring &gt;2.0 mmol/L be



   repeated.

 

 34  Verbal to [id number] by [id number] at 0311 on 17.



   Results read back accurately.

 

 35  *******Because ethnic data is not always readily available,



   this report includes an eGFR for both -Americans and



   non- Americans.****



   The National Kidney Disease Education Program (NKDEP) does



   not endorse the use of the MDRD equation for patients that



   are not between the ages of 18 and 70, are pregnant, have



   extremes of body size, muscle mass, or nutritional status,



   or are non- or non-.



   According to the National Kidney Foundation, irrespective of



   diagnosis, the stage of the disease is based on the level of



   kidney function:



   Stage Description                      GFR(mL/min/1.73 m(2))



   1     Kidney damage with normal or decreased GFR       90



   2     Kidney damage with mild decrease in GFR          60-89



   3     Moderate decrease in GFR                         30-59



   4     Severe decrease in GFR                           15-29



   5     Kidney failure                       &lt;15 (or dialysis)

 

 36  [facility] Severe Sepsis and Septic Shock Management Bundle Measure



   requires all lactic acids initially measuring &gt;2.0 mmol/L be



   repeated.

 

 37  *******Because ethnic data is not always readily available,



   this report includes an eGFR for both -Americans and



   non- Americans.****



   The National Kidney Disease Education Program (NKDEP) does



   not endorse the use of the MDRD equation for patients that



   are not between the ages of 18 and 70, are pregnant, have



   extremes of body size, muscle mass, or nutritional status,



   or are non- or non-.



   According to the National Kidney Foundation, irrespective of



   diagnosis, the stage of the disease is based on the level of



   kidney function:



   Stage Description                      GFR(mL/min/1.73 m(2))



   1     Kidney damage with normal or decreased GFR       90



   2     Kidney damage with mild decrease in GFR          60-89



   3     Moderate decrease in GFR                         30-59



   4     Severe decrease in GFR                           15-29



   5     Kidney failure                       &lt;15 (or dialysis)

 

 38  Acute inflammation:  &gt;10.00

 

 39  Normal Range 180 to 914



   Indeterminate Range 145 to 180



   Deficient Range  &lt;145

 

 40  &gt;100 to &lt;200 pg/mL: likely compensated congestive heart



   failure (CHF)



   200 to 400 pg/mL: likely moderate CHF



   &gt;400 pg/mL: likely moderate to severe CHF

 

 41  *******Because ethnic data is not always readily available,



   this report includes an eGFR for both -Americans and



   non- Americans.****



   The National Kidney Disease Education Program (NKDEP) does



   not endorse the use of the MDRD equation for patients that



   are not between the ages of 18 and 70, are pregnant, have



   extremes of body size, muscle mass, or nutritional status,



   or are non- or non-.



   According to the National Kidney Foundation, irrespective of



   diagnosis, the stage of the disease is based on the level of



   kidney function:



   Stage Description                      GFR(mL/min/1.73 m(2))



   1     Kidney damage with normal or decreased GFR       90



   2     Kidney damage with mild decrease in GFR          60-89



   3     Moderate decrease in GFR                         30-59



   4     Severe decrease in GFR                           15-29



   5     Kidney failure                       &lt;15 (or dialysis)

 

 42  **Please note:



   The following may produce a false positive D Dimer test:



   - Rheumatoid factor greater than 60 IU/ml



   - Plasma hemoglobin greater than 0.05 gm/dl



   - Bilirubin greater than 50 mg/dl



   - Lipids greater than 1000 mg/dl



   - FDP greater than 20 ug/ml

 

 43  [facility] Severe Sepsis and Septic Shock Management Bundle Measure



   requires all lactic acids initially measuring &gt;2.0 mmol/L be



   repeated.

 

 44  Therapeutic target for the treatment of diabetes



   [place] patients is &lt;7% HBA1C, and in selective



   patients &lt;6.0%.Please refer to American Diabetes



   Association Diabetic care guidelines for further



   information.

 

 45  *******Because ethnic data is not always readily available,



   this report includes an eGFR for both -Americans and



   non- Americans.****



   The National Kidney Disease Education Program (NKDEP) does



   not endorse the use of the MDRD equation for patients that



   are not between the ages of 18 and 70, are pregnant, have



   extremes of body size, muscle mass, or nutritional status,



   or are non- or non-.



   According to the National Kidney Foundation, irrespective of



   diagnosis, the stage of the disease is based on the level of



   kidney function:



   Stage Description                      GFR(mL/min/1.73 m(2))



   1     Kidney damage with normal or decreased GFR       90



   2     Kidney damage with mild decrease in GFR          60-89



   3     Moderate decrease in GFR                         30-59



   4     Severe decrease in GFR                           15-29



   5     Kidney failure                       &lt;15 (or dialysis)

 

 46  Instrument used is [employer] . The assay is a



   two-site immunoenzymatic "sandwich" assay. Do not interpret



    levels as absolute evidence of the presence or the



   absence of malignant disease. Use in conjunction with



   information from the clinical evaluation of the patient and



   other diagnostic procedures. Values obtained with different



   assay methods cannot be used interchangeably.

 

 47  SEE RESULT BELOW



   -----------------------------------------------------------------------------
---------------



   Name:  SUELLEN PATEL                 : 1940    Attend Dr: Prachi Hansen MD



   Acct:  [id number]  Unit: [id number]  AGE: 76            Location:  [facility]



   Re17                        SEX: F             Status:    REG REF



   -----------------------------------------------------------------------------
---------------



   



   SPEC: [id number]         PARRISH:       [facility] DR: Prachi Hansen MD



   REQ:  [id number]              RECD:   



   STATUS: COMP



   _



   SOURCE: URINE          SPDESC:



   ORDERED:  Urine Culture



   Urine Source: Random



   



   -----------------------------------------------------------------------------
---------------



   Procedure                         Result                         Reported   
        Site



   -----------------------------------------------------------------------------
---------------



   Urine Culture  Final                                             17-
822      ML



   



   Organism 1                     ESCHERICHIA COLI



   [place] Count                &gt;100,000 (Many) CFU/ML



   



   1. ESCHERICHIA COLI



   M.I.C.      RX



   --------- ------



   Ampicillin                     &gt;=32        R



   Cefazolin                      &lt;=4         S



   Cefepime                       &lt;=1         S



   Ceftriaxone                    &lt;=1         S



   Ciprofloxacin                  &lt;=0.25      S



   Gentamicin                     &lt;=1         S



   Levofloxacin                   &lt;=0.12      S



   Meropenem                      &lt;=0.25      S



   Nitrofurantoin                 &lt;=16        S



   Tetracycline                   &lt;=1         S



   Pipercillin/Tazobactam         &lt;=4         S



   Trimethoprim/Sulfamethoxazole  &lt;=20        S



   Amoxicillin/Clavulanic Acid    8           S



   Aztreonam                      &lt;=1         S



   



   



   Contact the Microbiology Department for any additional



   antibiotic reporting.



   



   -----------------------------------------------------------------------------
---------------



   * ML - MAIN LAB ([facility])



   .



   



   ** END OF REPORT **



   



   * ML=Testing performed at Main Lab



   DEPARTMENT OF PATHOLOGY,  [address] [place] [address]



   Phone # [phone]      Fax #[phone]



   Luis Jung M.D. Director     [facility] # [id number]

 

 48  SEE RESULT BELOW



   -----------------------------------------------------------------------------
---------------



   Name:  SUELLEN PATEL                 : 1940    Attend Dr: 
Vikki Richardson NP



   Acct:  [id number]  Unit: [id number]  AGE: 76            Location:  [facility]



   Re16                        SEX: F             Status:    REG REF



   -----------------------------------------------------------------------------
---------------



   



   SPEC: [id number]         PARRISH:       SUBM DR: Vikki Richardson NP



   REQ:  [id number]              RECD:   



   STATUS: COMP



   _



   SOURCE: URINE          SPDESC:



   ORDERED:  Urine Culture



   COMMENTS: [id number]



   Urine Source: Random



   



   -----------------------------------------------------------------------------
---------------



   Procedure                         Result                         Reported   
        Site



   -----------------------------------------------------------------------------
---------------



   Urine Culture  Final                                             16-
906      ML



   



   Few Enterobacteriacae; possible contamination.



   



   -----------------------------------------------------------------------------
---------------



   * ML - MAIN LAB ([facility])



   .



   



   



   



   



   



   



   



   



   



   



   



   



   



   



   



   



   



   



   



   



   



   



   



   ** END OF REPORT **



   



   * ML=Testing performed at Main Lab



   DEPARTMENT OF PATHOLOGY,  [address] [place] [address]



   Phone # [phone]      Fax #[phone]



   Luis Jung M.D. Director     [facility] # [id number]

 

 49  Not diagnostic. Supplemental testing ordered by reflex.



   Test Performed by:



   [place] [address]



   : Justin Smith II, M.D., Ph.D.

 

 50  RESULT: p66, p41, p30, p23, p18,

 

 51  Consistent with active or previous infection for B.



   burgdorferi.



   IgM blot criteria is of diagnostic utility only during the



   first 4 weeks of early Lyme disease.



   -------------------ADDITIONAL INFORMATION-------------------



   CDC criteria require &gt;=5 bands for IgG or &gt;=2 bands for IgM



   for the Immunoblot to be considered positive. Bands



   (e.g.,p41) may be detected in patients without Lyme



   disease, and patterns not meeting the CDC criteria should



   be interpreted with caution.



   Immunoblot should be ordered only on specimens that are



   positive or equivocal by a FDA-licensed Lyme disease



   antibody screening test (e.g., EIA).



   Test Performed by:



   [place] [address]



   : Justin Smith II, M.D., Ph.D.

 

 52  Desirable &lt;150



   Borderline high 150-199



   High 200-499



   Very High &gt;500

 

 53  Desirable &lt;200



   Borderline high 200-239



   High &gt;239

 

 54  Low &lt;40



   Desirable: 40-60



   High: &gt;60

 

 55  Desirable: &lt;100 mg/dL



   Near Optimal: 100-129 mg/dL



   Borderline High: 130-159 mg/dL



   High: 160-189 mg/dL



   Very High: &gt;189 mg/dL

 

 56  *******Because ethnic data is not always readily available,



   this report includes an eGFR for both -Americans and



   non- Americans.****



   The National Kidney Disease Education Program (NKDEP) does



   not endorse the use of the MDRD equation for patients that



   are not between the ages of 18 and 70, are pregnant, have



   extremes of body size, muscle mass, or nutritional status,



   or are non- or non-.



   According to the National Kidney Foundation, irrespective of



   diagnosis, the stage of the disease is based on the level of



   kidney function:



   Stage Description                      GFR(mL/min/1.73 m(2))



   1     Kidney damage with normal or decreased GFR       90



   2     Kidney damage with mild decrease in GFR          60-89



   3     Moderate decrease in GFR                         30-59



   4     Severe decrease in GFR                           15-29



   5     Kidney failure                       &lt;15 (or dialysis)

 

 57  Acute inflammation:  &gt;10.00

 

 58  Consistent with early infection with Borrelia burgdorferi.



   A new serum specimen should be submitted in 14-21 days to



   demonstrate seroconversion of IgG.



   IgM blot criteria is of diagnostic utility only during the



   first 4 weeks of early Lyme disease.



   -------------------ADDITIONAL INFORMATION-------------------



   CDC criteria require &gt;=5 bands for IgG or &gt;=2 bands for IgM



   for the Immunoblot to be considered positive. Bands



   (e.g.,p41) may be detected in patients without Lyme



   disease, and patterns not meeting the CDC criteria should



   be interpreted with caution.



   Immunoblot should be ordered only on specimens that are



   positive or equivocal by a FDA-licensed Lyme disease



   antibody screening test (e.g., EIA).



   Test Performed by:



   [place] [id number]



   : Justin Smith II, M.D., Ph.D.

 

 59  Not diagnostic. Supplemental testing ordered by reflex.



   Test Performed by:



   [place] [id number]



   : Justin Smith II, M.D., Ph.D.







Procedures







 Date  CPT Code  Description  Status

 

 2017  52256  EKG Tracing &amp; Interpretation  Completed

 

 10/30/2017  35851  ECHO Transthorasic Realtime 2D W Doppler &amp; Color  
Completed



     Flow [facility]  

 

 10/30/2017  74171  EKG, Interpretation Only  Completed

 

 08/10/2017  17184  Nerve Conduction 05-06 Studies  Completed

 

 08/10/2017  51901  Needle Electromyography Complete, Five Or More Muscles  
Completed



     Studied  

 

 2017  59484  EKG Tracing &amp; Interpretation  Completed

 

 2017  00518  ECHO Transthoracic, Real-Time 2D With Doppler And Color  
Completed



     Flow  

 

 2011    Diabetic Foot Exam  Completed

 

 2010    Bone Mineral Density Test  Completed

 

 2010  78309  EKG Tracing &amp; Interpretation  Completed

 

 2009  88268  Holter Monitor Review (24 hr)dr review &amp; interp  
Completed



     only  

 

 07/15/2009  30640  EKG Tracing &amp; Interpretation  Completed

 

 2008    Mammogram  Completed

 

 2008  62221  EKG Tracing &amp; Interpretation  Completed

 

 2008  93868  EKG Tracing &amp; Interpretation  Completed







Encounters







 Type  Date  Location  Provider  CPT E/M  Dx

 

 Office Visit  2018 11:50a  [facility]  Prachi Hansen M.D.  
75591  Z23



     - [place]      









 Z12.11

 

 Z12.31

 

 M85.89

 

 C82.90

 

 Z71.89

 

 Z92.21









 Office Visit  2017  2:49p  [facility] Ronnie,  [address]41  
R50.81



     [facility]  MARIES    









 D64.9

 

 D70.9

 

 D69.6









 Office Visit  2017  2:49p  [facility]  Paramjit Trujillo,  [address]83  
R50.81



     [facility]  M.D.    









 D64.9

 

 D70.9

 

 D69.6









 Office Visit  2017  2:48p  [facility]  Paramjit Trujillo,  03514  
R50.81



     [facility]  M.D.    









 D64.9

 

 D70.9

 

 D69.6









 Office Visit  12/15/2017  2:47p  [facility],  [place],  
16143  R50.81



     Hospitalists  MARIES    









 D64.9

 

 D70.9

 

 D69.6









 Office Visit  2017  2:47p  [facility],  [place],  
06057  R50.81



     Hospitalists  MCapoDCapo    









 D70.9

 

 D64.9

 

 D69.6









 Office Visit  2017  2:46p  [facility],  [place],  
08260  R50.81



     Hospitalists  M.DCapo    









 D70.9

 

 D64.9

 

 D69.6









 Office Visit  2017  2:45p  [facility]  Noreen Nicolas,  [address]  
R50.81



     [facility]  NP    









 D70.9

 

 D64.9

 

 D69.6









 Office Visit  2017  3:52p  [facility],  Patito Hendrickson,  
87219  I48.0



     Hospitalists  M.RAJ    









 R74.8

 

 D72.829









 Office Visit  2017  2:00p  [facility]  Jorge Alberto Willett M.D.  
67008  I48.0









 C82.98

 

 D61.810

 

 J90









 Office Visit  2017  3:44p  [facility]  Patito Madhavi,  29454  I48.91



     Assoc[facility] Hospital[facility]  M.D.    









 C82.98

 

 D61.810

 

 D69.6









 Office Visit  2017  3:43p  [facility]  Patito Madhavi,  47297  I48.91



     Assoc, Hospitalists  M.D.    









 C82.98

 

 D61.810

 

 D69.6









 Office Visit  2017  4:26p  [facility]  Jorge Alberto Willett M.D.  
75329  I48.0









 C82.98

 

 J90

 

 I30.9

 

 D70.9

 

 D69.6









 Office Visit  2017  3:43p  [facility],  Michael Flood MD  [address]
  I48.91



     Hospitalists      









 C82.98

 

 D69.6

 

 D61.810









 Office Visit  10/31/2017  3:42p  [facility]  Michael Flood MD  69536
  I48.91



     Hospitalists      









 C82.98

 

 D61.810

 

 J90









 Office Visit  10/30/2017  3:42p  [facility]  Lukas Chicas MD  
15386  I48.91



     Hospitalists      









 D61.810

 

 C82.98

 

 J90









 Office Visit  10/29/2017  3:41p  [facility],  Erma Montes,  
50269  C82.98



     Hospitalists  M.D.    









 D64.81

 

 D69.6

 

 I48.91









 Office Visit  2017  1:55p  [facility]  Paramjit Trujillo,  
04098  R11.2



     Hospitalists  M.D.    









 C82.90

 

 D61.818









 Office Visit  2017  1:55p  [facility],  Valdez Marquez M.D.  
00495  R11.2



     Hospitalists      









 C82.90

 

 D61.818









 Office Visit  2017 10:25a  [facility]  Chandu Sanford,  
79104  G60.9



       MD    

 

 Office Visit  2017  8:11a  [facility],  
66558  C82.93



     Hospitalists  M.D.    









 R18.0

 

 R55

 

 N13.30









 Office Visit  08/10/2017 10:24a  [facility]  Chandu Snaford MD  
14425  G60.9









 R55

 

 R26.89









 Office Visit  08/10/2017  8:10a  [facility],  
28243  C82.93



     Hospitalists  M.D.    









 R55

 

 N13.30

 

 R18.0









 Office Visit  2017 10:24a  [facility]  Chandu Sanford MD  
92568  R26.89









 R42

 

 H53.8









 Office Visit  2017  8:09a  [facility],  
92194  C82.93



     Hospitalists  M.D.    









 R18.0

 

 N13.30

 

 R55









 Office Visit  2017 10:40a  [facility]  Prachi Hansen,  39069
  R19.09



     - Rena CARTY    









 N13.39









 Office Visit  2017  1:00p  [facility]  Prachi Hansen M.D.  
49972  R14.0



     - Rena      









 F43.0

 

 R19.09

 

 N13.30

 

 R18.8









 Office Visit  2017  7:30a  [facility]  Prachi Hansen,  05876
  Z00.00



     - Rena CARTY    









 I10

 

 M85.89

 

 R05

 

 R73.01

 

 N39.3

 

 Z12.39

 

 L57.0









 Office Visit  2017  1:20p  [facility]  Prachi Hansen M.D.  
07690  N39.0



     - Rena      









 R01.1

 

 R73.01









 Office Visit  2016 11:40a  [facility]  Vikki Richardson, N.PCapo  
61647  R35.0



     - [place]      









 N39.0









 Office Visit  2016  4:00p  [facility] -  Amos Joy,  
52928  R21



     Rena CARTY    









 R73.01

 

 Z23









 Office Visit  2016  8:30a  [facility]  Pedro Anand,  
11584  A69.20



     Infectious Diseases  M.D.    









 G47.01









 Office Visit  2016 10:20a  [facility]  Amos Joy,  
46630  A69.20



     - Rena CARTY    









 Z13.220

 

 Z13.1









 Office Visit  2016 10:00a  [facility]  Amos Joy,  
38225  R21



     - Rena CARTY    

 

 Office Visit  2016  4:20p  [facility] [place]  [address]  
L03.114



     - [place]      

 

 Office Visit  2012 11:20a  [facility]  Vikki Richardson, N.P.  
48164  719.47



     - [place]      









 V04.81









 Office Visit  2011  1:20p  DO Not Use [facility]  Vikki Richardson,  
39806  729.4



       N.P.    

 

 Office Visit  2010 11:30a  DO Not Use [facility]  Ailyn Knight M.D.,  
41063  272.0



       FACP    









 790.21

 

 733.90









 Office Visit  2010  9:30a  DO Not Use [facility]  Ailyn Antonia,  28299
  724.2



       M.D., FACP    









 733.90

 

 272.0

 

 401.1

 

 v04.81









 Office Visit  2010  9:45a  DO Not Use [facility]  Ailyn Antonia,  05518
  272.4



       M.D., FACP    









 719.45









 Office Visit  2010  9:00a  DO Not Use [facility]  Ailyn Antonia,  31120
  272.4



       M.D., FACP    









 401.1









 Office Visit  2010 10:45a  DO Not Use [facility]  Ailyn Antonia,  16242
  785.1



       M.D., FACP    









 272.4

 

 300.00

 

 311

 

 V04.81









 Office Visit  07/15/2009 11:45a  DO Not Use [facility]  Ailyn Antonia,  26183
  785.0



       M.D., FACP    









 785.1

 

 719.47









 Office Visit  2008  1:45p  DO Not Use [facility]  Ailyn Antonia,  17455
  272.4



       M.D., FACP    

 

 Office Visit  2008 11:15a  DO Not Use [facility]  Ailyn Antonia,  30950
  V72.31



       M.D., FACP    









 272.0

 

 401.1









 Office Visit  2007  1:30p  DO Not Use [facility]  Ailyn Antonia,  41456
  462



       M.D., FACP    

 

 Office Visit  2007 12:00p  DO Not Use [facility]  Ailyn Antonia,  79807
  372.30



       M.D., FACP    









 465.9









 Office Visit  2007  2:15p  DO Not Use [facility]  Ailyn Antonia,  26524
  289.3



       M.D., FACP    

 

 Office Visit  2006  2:30p  DO Not Use [facility]  Ailyn Antonia,  92509
  401.1



       M.D., FACP    









 V04.81









 Office Visit  10/03/2006  9:45a  DO Not Use [facility]  Ailyn Antonia,  91746
  272.0



       M.D., FACP    









 300.00







Plan of Care

Future Appointment(s):2018 11:50 am - Prachi Hansen M.D. at [facility] - Pcvdakrqa11/15/2018 - Prachi Hansen M.D.F43.0 Acute 
stress reactionComments:I did recommend counselling that you are already seeing
  which will be slelmrgL80.02 Adjustment insomniaComments:I think you can try 
melatonin at least 2 hrs before your sleep time, go to bed when you really are 
sleepy , try meditation, /yoga , we discussed there are sleep aids available if 
the need ujzkigO47.42 HypomagnesemiaNew Labs:MagnesiumComments:start the 
magnesium once a dayR73.9 Hyperglycemia, unspecifiedComments:we discussed your 
sugar was high so I would suggest repeating it in 4 wks again and drinks lots 
of waterFollow up:after 4 wks

## 2018-04-11 NOTE — XMS REPORT
Suellen Patel

 Created on:March 15, 2018



Patient:Suellen Patel

Sex:Female

:1940

External Reference #:[id number]





Demographics







 Address  [address] [place] [address]

 

 Mobile Phone  [phone]

 

 Email Address  [email or web address]

 

 Preferred Language  English

 

 Marital Status  Not  Or 

 

 Mu-ism Affiliation  Unknown

 

 Race  White

 

 Ethnic Group  Not  Or 









Author







 Organization  [facility]

 

 Address  [address] [address]

 

 Phone  [phone]









Support







 Name  Relationship  Address  Phone

 

 THEA Patel  Unavailable  Unavailable  [phone]









Care Team Providers







 Name  Role  Phone

 

 Prachi Hansen MD  Primary Care Physician  Unavailable









Payers







 Type  Date  Identification Numbers  Payment Provider  Subscriber

 

 Health Maintenance    Policy Number:  Medicare Blue Ppo  Suellen Patel



 [facility] [place] [place]    [id number]    









 Group Number: [id number]  [address] [address]

 

 PayID:   [place] [id number]









 Health Maintenance  Expires:  Policy Number:  Medicare Ramos SANTIAGO



 [facility] [place] [place]  10/01/2012  [id number]  [facility]  [place]









 PayID:   [address] [id number]









 [place] [id number]







Problems







 Date  Description  Provider  Status

 

 Onset:   Paroxysmal atrial fibrillation    Active

 

 Onset: 2011  Impaired fasting glycaemia  Vikki Richardson, N.PCapo  Active

 

 Onset: 2011  Benign essential hypertension  Vikki Richardson, N.PCapo  Active

 

 Onset: 2011  Hyperlipidemia  Vikki Richardson, N.PCapo  Active

 

 Onset: 2017  Osteopenia  Prachi Hansen M.D.  Active

 

 Onset: 2017  Ascites  Prachi Hansen M.D.  Active

 

 Onset: 2017  Hydronephrosis  Prachi Hansen M.D.  Active









   Note: L side









 Onset: 2017  Calculus of kidney and ureter  Prachi Hansen M.D.  Active









   Note: 1.4 cm









 Onset: 2017  Retroperitoneal mass  Prachi Hansen M.D.  Active

 

 Onset:   B-cell lymphoma (clinical)    Active









   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 Pallawi









 Onset: 2017  Pelvic mass  Prachi Hansen M.D.  Inactive









 Inactive: 2017







Family History







 Date  Family Member(s)  Problem(s)  Comments

 

   General  adopted  

 

   General  No Current Problems  

 

   Mother  Stroke  

 

   Siblings  2  2 half sisters she has not met. 1 of which



       has an eating disorder







Social History







 Type  Date  Description  Comments

 

 Marital Status      

 

 Lives With    Son  

 

 Occupation      microbiologist

 

 Cigarette Use    Never Smoked Cigarettes  

 

 ETOH Use    Occasionally consumes  



     alcohol  

 

 Smoking    Patient has never smoked  

 

 Recreational Drug Use    Denies Drug Use  Has a prescription for



       medicinal marijuana-but does



       not use currently 17

 

 Daily Caffeine    Comsumes on average 1 cup  



     of decaff coffee per day  

 

 Exercise Type/Frequency    Does not exercise  

 

 General Hx Text      Lives in [place], no time in



       the yard







Allergies, Adverse Reactions, Alerts







 Date  Description  Reaction  Status  Severity  Comments

 

 2010  Sulfa  INTOLERANCE-GI UPSET  active  Moderate  

 

 2017  Rituximab    active    







Medications







 Medication  Date  Status  Form  Strength  Qnty  SIG  Indications  Ordering



                 Provider

 

 Magnesium  /  Active  Tablets  250mg  90tabs  1 by mouth    Jorge Alberto



 Oxide            every other    F.



             day ( pt    Mastephanie,



             stop taking    M.D.



             hank    



             3/11/18)    

 

 Digoxin  /  Active  Tablets  125mcg  120tab  2 tabs by    Jorge Albertokimberly Oreilly        s  mouth every    F.



             m,w,f and 1    Mauser,



             tab every    M.D.



             other day of    



             the week    

 

 Vitamin B12  /  Active  Tablets ER  1000mcg    once a day    Prachi Hansen M.D.

 

 Multi For Her  /  Active  Tablets      once a day    Prachi Hansen M.D.

 

 Potassium  /  Active  Tablets ER  20Meq  90tabs  takes    Jorge Alberto



 Chloride ER  0000          occasionally    F.



             ---1 by    Mauser,



             mouth every    M.D.



             day ( Pt    



             stop taking    



             last week of    



             3/0518)    

 

 Medical  /  Active        2 puffs CTB    Unknown



 Marijuana  0000          and THC at    



             night  [place] in    



             [place]    

 

                 

 

 Magnesium  /  Hx  Tablets  250mg  90tabs  1 by mouth    Jorge Alberto



 Oxide  2017 -          every day    F.



   /              Afia Willett M.D.

 

 Magnesium  /  Hx  Tablets  250mg  90tabs  1 by mouth    Jorge Alberto



 Oxide -MG  2017 -          every day    F.



 Supplement  /              Makevonr,



                 M.D.

 

 Keflex  /  Hx  Capsules  500mg  10caps  1 by mouth    Prachi



    -          twice a day    Hansen,



   /          x 5 days    M.D.



                 

 

 Magnesium  /  Hx  Tablets  250mg  30tabs  1 by mouth    Jorge Alberto



 Oxide -MG  2017 -          every day    F.



 Supplement                Mauser2017              M.D.

 

 Magnesium  /  Hx  Tablets  250mg  30tabs  1 by mouth    Jorge Alberto



 Oxide  2017 -          every day    F.



                 Mauser2017              M.D.

 

 No Active  /  Hx            Unknown



 Medications  2017 -              



   2017              

 

 Digoxin  /  Hx  Tablets  125mcg  30tabs  1 by mouth    Jorge Alberto



   2017 -          daily    F.



   use2017              M.D.

 

 No Active  /  Hx            Unknown



 Medications  2017 -              



   2017              

 

 Cipro  /  Hx  Tablets  500mg  14tabs  1 by mouth  N39.0  Prachi



    -          twice a day    Hansen,



   /          for 7 days    M.D.



                 

 

 Cipro  /  Hx  Tablets  250mg  14tabs  Did Not Take    Vikki



    -              Varn,



   /              N.P.



   2017              

 

 Cephalexin  /  Hx  Capsules  500mg  21caps  take one  L03.114  Mani



   2016 -          capsule    Peg, NP



   /          every 8    



   2016          hours for 7    



             days    

 

 Ventolin HFA  /  Hx  Aerosol  108(90Base  1inhal  1 to 2    Vikki



    -      ) mcg/Act  er  inhalations    Varn,



   /          every 4    N.P.



   2015          hours as    



             needed    

 

 Zostavax  /  Hx  Solution  28051Qhv/0  1units  1 dose s/c    Ailyn



    -    Rec  .65ML        Antonia,



   /              M.D.,



   2016              FACP

 

 Crutches  /  Hx      1Pair  Use as  729.4  Ailyn



    -          needed for    Antonia,



   /          plantar    M.D.,



   2015          fasciitis    FACP

 

 Simvastatin  /  Hx  Tablets  20mg  90tabs  1 by mouth    Ailyn



    -          at bedtime    Antonia,



   /              M.D.,



   2011              FACP

 

 Physical      Back Pain        Ailyn



 Therapy  2010 -              Antonia,



   /              M.D.,



                 FACP

 

 Atenolol  /  Hx  Tablets  25mg  90tabs  take 1    Prachi



   0000 -          tablet by    Jade,



   /          mouth once    M.D.



             daily    

 

 Doxycycline  /  Hx  Capsules  100mg  22caps  Take 1    Amos E.



 Hyclate  0000 -          capsule by    Benita,



   /          mouth twice    M.D.



             daily    

 

 Tramadol HCL  /  Hx  Tablets  50mg        Unknown



   0000 -              



   2016              

 

 Ondansetron  /  Hx  Tablets  4mg    dissolve one    Unknown



   0000 -    Dispers      tablet    



   /          orally every    



             12 hours as    



             needed for    



             nausea.    

 

 R-Chop Chemo  /  Hx        Done    Unknown



 Medications  0000 -              



   03/15/              



   2018              







Immunizations







 CPT Code  Status  Date  Vaccine  Lot #

 

 [id number]  Given  2018  Pneumococcal Conjugate Vaccine 13 Valent For  [id number]



       Intramuscular Use  

 

 77428  Given  2018  Influenza Virus Vaccine, Quadrivalent, Split,  



       Preservative Free  

 

 43312  Given  2016  Influenza Virus Vaccine, Quadrivalent, Split,  
[id number]



       Preservative Free  

 

   Given  2014  Fluvirin Im 3Yrs And Older  

 

 13951  Given  2014  Tdap - Tetanus/Diptheria/Acellular Pertussis  

 

   Given  08/15/2013  Afluria Vaccine  

 

 34050  Given  08/15/2013  Zoster (Zostavax)  

 

   Given  2012  Fluzone Vaccine  [id number]

 

 38045  Given  2010  Influenza Virus 3Yrs &amp; Over  

 

 18123  Given  2010  Influenza Virus Vaccine, Pandemic Formulation  

 

 00837  Given  2010  Influenza Virus 3Yrs &amp; Over  

 

 34251  Given  2010  Administration Swine Flu Shot  

 

 55335  Given  2006  Influenza Virus 3Yrs &amp; Over  







Vital Signs







 Date  Vital  Result  Comment

 

 03/15/2018  Weight  144.00 lb  with shoes









 Heart Rate  100 /min  

 

 BP Systolic Sitting  128 mmHg  Lue reg cuff

 

 BP Diastolic Sitting  78 mmHg  Lue reg cuff

 

 BP Systolic Standing  130 mmHg  Lue reg cuff

 

 BP Diastolic Standing  80 mmHg  Lue reg cuff

 

 Respiratory Rate  18 /min  

 

 Ejection Fraction  55-60%  date 10/30/17 ECHO









 03/15/2018  Weight  144.00 lb  









 Heart Rate  95 /min  

 

 BP Systolic Sitting  120 mmHg  

 

 BP Diastolic Sitting  72 mmHg  

 

 O2 % BldC Oximetry  96 %  









 2018  Weight  144.12 lb  









 Heart Rate  98 /min  

 

 BP Systolic Sitting  110 mmHg  

 

 BP Diastolic Sitting  60 mmHg  

 

 Body Temperature  97.8 F  

 

 O2 % BldC Oximetry  95 %  









 2017  Height  65 inches  5'5"









 Weight  135.00 lb  with shoes

 

 Heart Rate  106 /min  

 

 BP Systolic Sitting  126 mmHg  LA small cuff

 

 BP Diastolic Sitting  72 mmHg  LA small cuff

 

 BP Systolic Standing  101 mmHg  la repeat sitting

 

 BP Diastolic Standing  64 mmHg  la repeat sitting

 

 BMI (Body Mass Index)  22.5 kg/m2  

 

 Ejection Fraction  55% - 60%  echo 10/30/17









 2017  Height  65 inches  5'5"









 Weight  167.00 lb  

 

 Heart Rate  100 /min  

 

 BP Systolic Sitting  130 mmHg  

 

 BP Diastolic Sitting  76 mmHg  

 

 Body Temperature  98.4 F  

 

 O2 % BldC Oximetry  95 %  

 

 BMI (Body Mass Index)  27.8 kg/m2  









 2017  Height  65 inches  5'5"









 Weight  168.00 lb  

 

 Heart Rate  97 /min  

 

 BP Systolic  120 mmHg  

 

 BP Diastolic  80 mmHg  

 

 Body Temperature  98.2 F  

 

 O2 % BldC Oximetry  98 %  

 

 BMI (Body Mass Index)  28.0 kg/m2  









 2017  Height  65 inches  5'5"









 Weight  180.38 lb  

 

 Heart Rate  79 /min  

 

 BP Systolic  120 mmHg  

 

 BP Diastolic  76 mmHg  

 

 Body Temperature  97.2 F  

 

 O2 % BldC Oximetry  96 %  

 

 BMI (Body Mass Index)  30.0 kg/m2  









 2017  Weight  184.12 lb  









 Heart Rate  90 /min  

 

 BP Systolic  120 mmHg  

 

 BP Diastolic  80 mmHg  

 

 Body Temperature  98.2 F  

 

 O2 % BldC Oximetry  96 %  









 2016  Weight  183.00 lb  









 Heart Rate  88 /min  

 

 BP Systolic Sitting  122 mmHg  

 

 BP Diastolic Sitting  64 mmHg  

 

 Respiratory Rate  15 /min  

 

 Body Temperature  97.4 F  

 

 O2 % BldC Oximetry  98 %  









 2016  Weight  184.00 lb  









 Heart Rate  80 /min  

 

 BP Systolic Sitting  112 mmHg  

 

 BP Diastolic Sitting  64 mmHg  

 

 Body Temperature  98.4 F  

 

 O2 % BldC Oximetry  97 %  









 2016  Height  65.5 inches  5'5.50"









 Weight  184.25 lb  

 

 Heart Rate  64 /min  

 

 BP Systolic Sitting  120 mmHg  

 

 BP Diastolic Sitting  82 mmHg  

 

 Respiratory Rate  14 /min  

 

 Body Temperature  98.0 F  

 

 BMI (Body Mass Index)  30.2 kg/m2  









 2016  Height  65.5 inches  5'5.50"









 Weight  184.12 lb  

 

 Heart Rate  72 /min  

 

 BP Systolic Sitting  132 mmHg  

 

 BP Diastolic Sitting  82 mmHg  

 

 Body Temperature  97.3 F  

 

 O2 % BldC Oximetry  98 %  

 

 BMI (Body Mass Index)  30.2 kg/m2  









 2016  Weight  184.50 lb  









 Heart Rate  92 /min  

 

 BP Systolic Sitting  126 mmHg  

 

 BP Diastolic Sitting  70 mmHg  

 

 Body Temperature  99.0 F  

 

 O2 % BldC Oximetry  96 %  









 2016  Weight  192.00 lb  









 Heart Rate  84 /min  

 

 BP Systolic Sitting  144 mmHg  

 

 BP Diastolic Sitting  84 mmHg  

 

 Respiratory Rate  15 /min  

 

 Body Temperature  100.2 F  

 

 O2 % BldC Oximetry  98 %  









 2015  Height  65.5 inches  5'5.50"









 Weight  192.00 lb  

 

 Heart Rate  66 /min  

 

 BP Systolic Sitting  128 mmHg  

 

 BP Diastolic Sitting  80 mmHg  

 

 Body Temperature  98.7 F  

 

 O2 % BldC Oximetry  97 %  

 

 BMI (Body Mass Index)  31.5 kg/m2  









 2012  Height  65.5 inches  5'5.50"









 Weight  190.00 lb  

 

 Heart Rate  60 /min  

 

 BP Systolic Sitting  122 mmHg  

 

 BP Diastolic Sitting  76 mmHg  

 

 BMI (Body Mass Index)  31.1 kg/m2  









 2011  Height  65.5 inches  5'5.50"









 Weight  187.00 lb  

 

 Heart Rate  58 /min  

 

 BP Systolic Sitting  132 mmHg  L

 

 BP Diastolic Sitting  80 mmHg  L

 

 BMI (Body Mass Index)  30.6 kg/m2  









 2010  Weight  179.00 lb  









 Heart Rate  62 /min  

 

 BP Systolic  108 mmHg  

 

 BP Diastolic  68 mmHg  









 2010  Weight  178.25 lb  









 Heart Rate  72 /min  

 

 BP Systolic  116 mmHg  

 

 BP Diastolic  70 mmHg  







Results







 Test  Date  Test  Result  H/L  Range  Note

 

 Laboratory test finding  03/15/2018  Digoxin  &lt;pending&gt;      

 

 Laboratory test finding  03/15/2018  Magnesium  &lt;pending&gt;      

 

 Comp Metabolic Panel  2018  Sodium  139 mmol/L    133-145  









 Potassium  3.9 mmol/L    3.5-5.0  

 

 Chloride  106 mmol/L    101-111  

 

 Co2 Carbon Dioxide  28 mmol/L    22-32  

 

 Anion Gap  5 mmol/L    2-11  

 

 Glucose  108 mg/dL  High    

 

 Blood Urea Nitrogen  25 mg/dL  High  6-24  

 

 Creatinine  0.72 mg/dL    0.51-0.95  

 

 BUN/Creatinine Ratio  34.7  High  8-20  

 

 Calcium  9.6 mg/dL    8.6-10.3  

 

 Total Protein  6.3 g/dL  Low  6.4-8.9  

 

 Albumin  4.2 g/dL    3.2-5.2  

 

 Globulin  2.1 g/dL    2-4  

 

 Albumin/Globulin Ratio  2.0    1-3  

 

 Total Bilirubin  0.50 mg/dL    0.2-1.0  

 

 Alkaline Phosphatase  55 U/L      

 

 Alt  18 U/L    7-52  

 

 Ast  22 U/L    13-39  

 

 Egfr Non-  79.0    &gt;60  

 

 Egfr   101.6    &gt;60  1









 Laboratory test finding  2018  Magnesium  1.7 mg/dL  Low  1.9-2.7  









 Digoxin  0.5 ng/ml  Low  0.8-2.0  









 Comp Metabolic Panel  2018  Sodium  138 mmol/L    133-145  









 Potassium  3.8 mmol/L    3.5-5.0  

 

 Chloride  104 mmol/L    101-111  

 

 Co2 Carbon Dioxide  29 mmol/L    22-32  

 

 Anion Gap  5 mmol/L    2-11  

 

 Glucose  116 mg/dL  High    

 

 Blood Urea Nitrogen  22 mg/dL    6-24  

 

 Creatinine  0.81 mg/dL    0.51-0.95  

 

 BUN/Creatinine Ratio  27.2  High  8-20  

 

 Calcium  9.2 mg/dL    8.6-10.3  

 

 Total Protein  5.6 g/dL  Low  6.4-8.9  

 

 Albumin  3.8 g/dL    3.2-5.2  

 

 Globulin  1.8 g/dL  Low  2-4  

 

 Albumin/Globulin Ratio  2.1    1-3  

 

 Total Bilirubin  0.40 mg/dL    0.2-1.0  

 

 Alkaline Phosphatase  49 U/L      

 

 Alt  13 U/L    7-52  

 

 Ast  19 U/L    13-39  

 

 Egfr Non-  68.9    &gt;60  

 

 Egfr   88.6    &gt;60  2









 Laboratory test finding  2018  Uric Acid  5.6 mg/dL    2.3-6.6  









 LDH  121 U/L  Low  140-271  









 CBC Auto Diff  2018  White Blood Count  4.2 10^3/uL    3.5-10.8  









 Red Blood Count  3.58 10^6/uL  Low  4.0-5.4  

 

 Hemoglobin  11.1 g/dL  Low  12.0-16.0  

 

 Hematocrit  33 %  Low  35-47  

 

 Mean Corpuscular Volume  93 fL    80-97  

 

 Mean Corpuscular Hemoglobin  31 pg    27-31  

 

 Mean Corpuscular HGB Conc  33 g/dL    31-36  

 

 Red Cell Distribution Width  15 %    10.5-15  

 

 Platelet Count  115 10^3/uL  Low  150-450  

 

 Mean Platelet Volume  8 um3    7.4-10.4  

 

 Abs Neutrophils  3.2 10^3/uL    1.5-7.7  

 

 Abs Lymphocytes  0.5 10^3/uL  Low  1.0-4.8  

 

 Abs Monocytes  0.5 10^3/uL    0-0.8  

 

 Abs Eosinophils  0.1 10^3/uL    0-0.6  

 

 Abs Basophils  0 10^3/uL    0-0.2  

 

 Abs Nucleated RBC  0 10^3/uL      

 

 Granulocyte %  76.0 %    38-83  

 

 Lymphocyte %  10.9 %  Low  25-47  

 

 Monocyte %  11.4 %  High  1-9  

 

 Eosinophil %  1.2 %    0-6  

 

 Basophil %  0.5 %    0-2  

 

 Nucleated Red Blood Cells %  0.1      









 Urinalysis Profile  2017  Urine Color  Yellow      









 Urine Appearance  Clear      

 

 Urine Specific Gravity  1.003  Low  1.010-1.030  

 

 Urine pH  7.0    5-9  

 

 Urine Urobilinogen  Negative    Negative  

 

 Urine Ketones  Negative    Negative  

 

 Urine Protein  Negative    Negative  

 

 Urine Leukocytes  Negative    Negative  

 

 Urine Blood  1+    Negative  

 

 Urine Nitrite  Negative    Negative  

 

 Urine Bilirubin  Negative    Negative  

 

 Urine Glucose  Negative    Negative  

 

 Urine White Blood Cell  Absent    Absent  

 

 Urine Red Blood Cell  Trace(0-2/hpf)    Absent  

 

 Urine Bacteria  Absent    Absent  









 Laboratory test finding  2017  Digoxin  0.7 ng/ml  Low  0.8-2.0  









 Magnesium  1.7 mg/dL  Low  1.9-2.7  

 

 Pathologist Review  (SEE NOTE)      3

 

 Blood Culture  SEE RESULT BELOW      4









 Manual Differential  2017  Neutrophil %  17 %  Low  38-83  









 Lymphocytes %  62 %  High  25-47  

 

 Monocytes %  10 %    0-13  

 

 Eosinophils %  1 %    0-6  

 

 Basophil %  1 %    0-2  

 

 Reactive Lymph %  9 %  High  0-6  

 

 Hypochromasia  1+      

 

 Comments  (SEE NOTE)      5









 CBC Auto Diff  2017  White Blood Count  0.3 10^3/uL  Low  3.5-10.8  









 Red Blood Count  2.84 10^6/uL  Low  4.0-5.4  

 

 Hemoglobin  8.9 g/dL  Low  12.0-16.0  

 

 Hematocrit  26 %  Low  35-47  

 

 Mean Corpuscular Volume  92 fL    80-97  

 

 Mean Corpuscular Hemoglobin  31 pg    27-31  

 

 Mean Corpuscular HGB Conc  34 g/dL    31-36  

 

 Red Cell Distribution Width  14 %    10.5-15  

 

 Platelet Count  29 10^3/uL  Low  150-450  

 

 Mean Platelet Volume  8 um3    7.4-10.4  

 

 Abs Neutrophils  0.1 10^3/uL  Low  1.5-7.7  6

 

 Abs Lymphocytes  0.2 10^3/uL  Low  1.0-4.8  

 

 Abs Monocytes  0 10^3/uL    0-0.8  

 

 Abs Eosinophils  0 10^3/uL    0-0.6  

 

 Abs Basophils  0 10^3/uL    0-0.2  









 Laboratory test finding  2017  Troponin-I (TnI)  0.07 ng/mL  High  &lt;
0.04  7

 

 Comp Metabolic Panel  2017  Sodium  135 mmol/L    133-145  









 Potassium  3.7 mmol/L    3.5-5.0  

 

 Chloride  102 mmol/L    101-111  

 

 Co2 Carbon Dioxide  28 mmol/L    22-32  

 

 Anion Gap  5 mmol/L    2-11  

 

 Glucose  88 mg/dL      

 

 Blood Urea Nitrogen  21 mg/dL    6-24  

 

 Creatinine  0.83 mg/dL    0.51-0.95  

 

 BUN/Creatinine Ratio  25.3  High  8-20  

 

 Calcium  8.8 mg/dL    8.6-10.3  

 

 Total Protein  5.5 g/dL  Low  6.4-8.9  

 

 Albumin  3.5 g/dL    3.2-5.2  

 

 Globulin  2.0 g/dL    2-4  

 

 Albumin/Globulin Ratio  1.8    1-3  

 

 Total Bilirubin  0.60 mg/dL    0.2-1.0  

 

 Alkaline Phosphatase  57 U/L      

 

 Alt  10 U/L    7-52  

 

 Ast  11 U/L  Low  13-39  

 

 Egfr Non-  67.0    &gt;60  

 

 Egfr   86.2    &gt;60  8









 Laboratory test finding  2017  Partial Thrombo Time  33.1 seconds    26.0
-36.3  



     PTT        

 

 Inr/Protime  2017  Inr  0.99    0.77-1.02  9

 

 Laboratory test finding  2017  Lactic Acid  1.0 mmol/L    0.5-2.0  10

 

 Laboratory test finding  2017  Lactic Acid  1.5 mmol/L    0.5-2.0  11

 

 Laboratory test finding  2017  Magnesium  1.7 mg/dL  Low  1.9-2.7  

 

 Comp Metabolic Panel  2017  Sodium  135 mmol/L    133-145  









 Potassium  4.0 mmol/L    3.5-5.0  

 

 Chloride  99 mmol/L  Low  101-111  

 

 Co2 Carbon Dioxide  32 mmol/L    22-32  

 

 Anion Gap  4 mmol/L    2-11  

 

 Glucose  86 mg/dL      

 

 Blood Urea Nitrogen  22 mg/dL    6-24  

 

 Creatinine  0.61 mg/dL    0.51-0.95  

 

 BUN/Creatinine Ratio  36.1  High  8-20  

 

 Calcium  9.0 mg/dL    8.6-10.3  

 

 Total Protein  5.4 g/dL  Low  6.4-8.9  

 

 Albumin  3.6 g/dL    3.2-5.2  

 

 Globulin  1.8 g/dL  Low  2-4  

 

 Albumin/Globulin Ratio  2.0    1-3  

 

 Total Bilirubin  0.60 mg/dL    0.2-1.0  

 

 Alkaline Phosphatase  65 U/L      

 

 Alt  10 U/L    7-52  

 

 Ast  15 U/L    13-39  

 

 Egfr Non-  95.6    &gt;60  

 

 Egfr   123.0    &gt;60  12









 Laboratory test finding  2017  Uric Acid  5.1 mg/dL    2.3-6.6  









 LDH  132 U/L  Low  140-271  









 Basic Metabolic Panel  2017  Sodium  135 mmol/L    133-145  









 Potassium  3.9 mmol/L    3.5-5.0  

 

 Chloride  100 mmol/L  Low  101-111  

 

 Co2 Carbon Dioxide  32 mmol/L    22-32  

 

 Anion Gap  3 mmol/L    2-11  

 

 Glucose  85 mg/dL      

 

 Blood Urea Nitrogen  22 mg/dL    6-24  

 

 Creatinine  0.61 mg/dL    0.51-0.95  

 

 BUN/Creatinine Ratio  36.1  High  8-20  

 

 Calcium  9.0 mg/dL    8.6-10.3  

 

 Egfr Non-  95.6    &gt;60  

 

 Egfr   123.0    &gt;60  13









 Laboratory test  2017  Troponin-I (TnI)  0.07 ng/mL  High  &lt;0.04  14



 finding            

 

 Laboratory test  2017  Partial Thrombo Time  30.9 seconds    26.0-36.3  



 finding    PTT        









 Lactic Acid  1.6 mmol/L    0.5-2.0  15

 

 B-Type Natriuretic Peptide BNP  524 pg/mL  High    16









 Comp Metabolic Panel  2017  Sodium  139 mmol/L    133-145  









 Potassium  3.4 mmol/L  Low  3.5-5.0  

 

 Chloride  105 mmol/L    101-111  

 

 Co2 Carbon Dioxide  27 mmol/L    22-32  

 

 Anion Gap  7 mmol/L    2-11  

 

 Glucose  111 mg/dL  High    

 

 Blood Urea Nitrogen  24 mg/dL    6-24  

 

 Creatinine  0.65 mg/dL    0.51-0.95  

 

 BUN/Creatinine Ratio  36.9  High  8-20  

 

 Calcium  9.5 mg/dL    8.6-10.3  

 

 Total Protein  5.7 g/dL  Low  6.4-8.9  

 

 Albumin  3.8 g/dL    3.2-5.2  

 

 Globulin  1.9 g/dL  Low  2-4  

 

 Albumin/Globulin Ratio  2.0    1-3  

 

 Total Bilirubin  0.60 mg/dL    0.2-1.0  

 

 Alkaline Phosphatase  93 U/L      

 

 Alt  10 U/L    7-52  

 

 Ast  17 U/L    13-39  

 

 Egfr Non-  88.9    &gt;60  

 

 Egfr   114.3    &gt;60  17









 Laboratory test finding  2017  Magnesium  1.9 mg/dL    1.9-2.7  









 Creatine Kinase(CK)  12 U/L      

 

 Troponin-I (TnI)  0.07 ng/mL  High  &lt;0.04  18

 

 Digoxin  1.6 ng/ml    0.8-2.0  

 

 TSH (Thyroid Stim Horm)  0.26 mcIU/mL  Low  0.34-5.60  

 

 T3 Free  2.50 pg/mL    2.5-3.9  

 

 Free T4 (Free Thyroxine)  0.94 ng/dL    0.61-1.12  









 CBC Auto Diff  2017  White Blood Count  18.2 10^3/uL  High  3.5-10.8  









 Red Blood Count  2.94 10^6/uL  Low  4.0-5.4  

 

 Hemoglobin  9.2 g/dL  Low  12.0-16.0  

 

 Hematocrit  27 %  Low  35-47  

 

 Mean Corpuscular Volume  93 fL    80-97  

 

 Mean Corpuscular Hemoglobin  31 pg    27-31  

 

 Mean Corpuscular HGB Conc  33 g/dL    31-36  

 

 Red Cell Distribution Width  16 %  High  10.5-15  

 

 Platelet Count  53 10^3/uL  Low  150-450  

 

 Mean Platelet Volume  9 um3    7.4-10.4  

 

 Abs Neutrophils  18.2 10^3/uL  High  1.5-7.7  

 

 Abs Lymphocytes  0 10^3/uL  Low  1.0-4.8  

 

 Abs Monocytes  0 10^3/uL    0-0.8  

 

 Abs Eosinophils  0 10^3/uL    0-0.6  

 

 Abs Basophils  0 10^3/uL    0-0.2  









 Manual Differential  2017  Immature Granulocytes  2 %    0-9  









 Neutrophil %  98 %  High  38-83  

 

 Band %  2 %    0-8  

 

 Hypochromasia  1+      









 Laboratory test finding  2017  Pathologist Review  (SEE NOTE)      19

 

 Laboratory test finding  2017  Digoxin  0.5 ng/ml  Low  0.8-2.0  20, 21









 Magnesium  1.7 mg/dL  Low  1.9-2.7  20, 22









 CBC Auto Diff  2017  White Blood Count  5.5 10^3/uL    3.5-10.8  









 Red Blood Count  3.13 10^6/uL  Low  4.0-5.4  

 

 Hemoglobin  9.7 g/dL  Low  12.0-16.0  

 

 Hematocrit  29 %  Low  35-47  

 

 Mean Corpuscular Volume  93 fL    80-97  

 

 Mean Corpuscular Hemoglobin  31 pg    27-31  

 

 Mean Corpuscular HGB Conc  34 g/dL    31-36  

 

 Red Cell Distribution Width  16 %  High  10.5-15  

 

 Platelet Count  88 10^3/uL  Low  150-450  23

 

 Mean Platelet Volume  8 um3    7.4-10.4  

 

 Abs Neutrophils  4.5 10^3/uL    1.5-7.7  

 

 Abs Lymphocytes  0.5 10^3/uL  Low  1.0-4.8  

 

 Abs Monocytes  0.5 10^3/uL    0-0.8  

 

 Abs Eosinophils  0 10^3/uL    0-0.6  

 

 Abs Basophils  0 10^3/uL    0-0.2  

 

 Abs Nucleated RBC  0 10^3/uL      

 

 Granulocyte %  81.2 %    38-83  

 

 Lymphocyte %  8.8 %  Low  25-47  

 

 Monocyte %  9.6 %  High  1-9  

 

 Eosinophil %  0.1 %    0-6  

 

 Basophil %  0.3 %    0-2  

 

 Nucleated Red Blood Cells %  0.1      









 Comp Metabolic Panel  2017  Sodium  140 mmol/L    133-145  









 Potassium  3.9 mmol/L    3.5-5.0  

 

 Chloride  103 mmol/L    101-111  

 

 Co2 Carbon Dioxide  33 mmol/L  High  22-32  

 

 Anion Gap  4 mmol/L    2-11  

 

 Glucose  103 mg/dL  High    

 

 Blood Urea Nitrogen  16 mg/dL    6-24  

 

 Creatinine  0.71 mg/dL    0.51-0.95  

 

 BUN/Creatinine Ratio  22.5  High  8-20  

 

 Calcium  9.3 mg/dL    8.6-10.3  

 

 Total Protein  5.7 g/dL  Low  6.4-8.9  

 

 Albumin  3.8 g/dL    3.2-5.2  

 

 Globulin  1.9 g/dL  Low  2-4  

 

 Albumin/Globulin Ratio  2.0    1-3  

 

 Total Bilirubin  0.30 mg/dL    0.2-1.0  

 

 Alkaline Phosphatase  64 U/L      

 

 Alt  9 U/L    7-52  

 

 Ast  14 U/L    13-39  

 

 Egfr Non-  80.3    &gt;60  

 

 Egfr   103.2    &gt;60  24









 Laboratory test finding  2017  Uric Acid  5.7 mg/dL    2.3-6.6  









 LDH  103 U/L  Low  140-271  

 

 Digoxin  0.4 ng/ml  Low  0.8-2.0  









 Comp Metabolic Panel  10/29/2017  Sodium  141 mmol/L    133-145  









 Potassium  3.0 mmol/L  Low  3.5-5.0  

 

 Chloride  107 mmol/L    101-111  

 

 Co2 Carbon Dioxide  26 mmol/L    22-32  

 

 Anion Gap  8 mmol/L    2-11  

 

 Glucose  112 mg/dL  High    

 

 Blood Urea Nitrogen  38 mg/dL  High  6-24  

 

 Creatinine  0.76 mg/dL    0.51-0.95  

 

 BUN/Creatinine Ratio  50.0  High  8-20  

 

 Calcium  9.4 mg/dL    8.6-10.3  

 

 Total Protein  5.7 g/dL  Low  6.4-8.9  

 

 Albumin  3.8 g/dL    3.2-5.2  

 

 Globulin  1.9 g/dL  Low  2-4  

 

 Albumin/Globulin Ratio  2.0    1-3  

 

 Total Bilirubin  0.70 mg/dL    0.2-1.0  

 

 Alkaline Phosphatase  76 U/L      

 

 Alt  10 U/L    7-52  

 

 Ast  16 U/L    13-39  

 

 Egfr Non-  74.2    &gt;60  

 

 Egfr   95.4    &gt;60  25









 Laboratory test finding  10/29/2017  Magnesium  1.8 mg/dL  Low  1.9-2.7  









 Creatine Kinase(CK)  &lt; 10 U/L  Low    

 

 Troponin-I (TnI)  0.03 ng/mL    &lt;0.04  









 CKMB  10/29/2017  CKMB  ng/mL  2.2 ng/mL    0.6-6.3  

 

 Laboratory test  10/29/2017  TSH (Thyroid Stim  0.43 mcIU/mL    0.34-5.60  



 finding    Horm)        

 

 Laboratory test  10/29/2017  B-Type Natriuretic  581 pg/mL  High    26



 finding    Peptide BNP        

 

 Laboratory test  10/29/2017  Lactic Acid  2.2 mmol/L  High  0.5-2.0  27



 finding            









 Blood Culture  SEE RESULT BELOW      28









 CBC Auto Diff  10/29/2017  White Blood Count  5.7 10^3/uL    3.5-10.8  









 Red Blood Count  3.10 10^6/uL  Low  4.0-5.4  

 

 Hemoglobin  9.1 g/dL  Low  12.0-16.0  

 

 Hematocrit  27 %  Low  35-47  

 

 Mean Corpuscular Volume  88 fL    80-97  

 

 Mean Corpuscular Hemoglobin  29 pg    27-31  

 

 Mean Corpuscular HGB Conc  33 g/dL    31-36  

 

 Red Cell Distribution Width  22 %  High  10.5-15  

 

 Platelet Count  46 10^3/uL  Low  150-450  

 

 Mean Platelet Volume  9 um3    7.4-10.4  

 

 Abs Nucleated RBC  0 10^3/uL      

 

 Abs Neutrophils  5.3 10^3/uL    1.5-7.7  

 

 Abs Lymphocytes  0.3 10^3/uL  Low  1.0-4.8  

 

 Abs Monocytes  0.1 10^3/uL    0-0.8  

 

 Abs Eosinophils  0 10^3/uL    0-0.6  

 

 Abs Basophils  0 10^3/uL    0-0.2  









 Manual Differential  10/29/2017  Immature Granulocytes  2 %    0-9  









 Neutrophil %  92 %  High  38-83  

 

 Band %  2 %    0-8  

 

 Lymphocytes %  5 %  Low  25-47  

 

 Monocytes %  1 %    0-13  

 

 Toxic Granulation  1+      

 

 Hypochromasia  1+      

 

 RBC Inclusions  Dohle Bodies      









 Laboratory test finding  10/29/2017  Pathologist Review  (SEE NOTE)      29

 

 Laboratory test finding  10/09/2017  LDH  176 U/L    140-271  

 

 Comp Metabolic Panel  10/09/2017  Sodium  138 mmol/L    133-145  









 Potassium  3.5 mmol/L    3.5-5.0  

 

 Chloride  104 mmol/L    101-111  

 

 Co2 Carbon Dioxide  29 mmol/L    22-32  

 

 Anion Gap  5 mmol/L    2-11  

 

 Glucose  114 mg/dL  High    

 

 Blood Urea Nitrogen  25 mg/dL  High  6-24  

 

 Creatinine  0.62 mg/dL    0.51-0.95  

 

 BUN/Creatinine Ratio  40.3  High  8-20  

 

 Calcium  8.7 mg/dL    8.6-10.3  

 

 Total Protein  5.3 g/dL  Low  6.4-8.9  

 

 Albumin  3.6 g/dL    3.2-5.2  

 

 Globulin  1.7 g/dL  Low  2-4  

 

 Albumin/Globulin Ratio  2.1    1-3  

 

 Total Bilirubin  0.80 mg/dL    0.2-1.0  

 

 Alkaline Phosphatase  64 U/L      

 

 Alt  10 U/L    7-52  

 

 Ast  14 U/L    13-39  

 

 Egfr Non-  93.8    &gt;60  

 

 Egfr   120.7    &gt;60  30









 Laboratory test finding  10/09/2017  Uric Acid  5.0 mg/dL    2.3-6.6  

 

 CBC Auto Diff  10/09/2017  White Blood Count  1.4 10^3/uL  Low  3.5-10.8  









 Red Blood Count  3.64 10^6/uL  Low  4.0-5.4  

 

 Hemoglobin  10.0 g/dL  Low  12.0-16.0  

 

 Hematocrit  30 %  Low  35-47  

 

 Mean Corpuscular Volume  83 fL    80-97  

 

 Mean Corpuscular Hemoglobin  27 pg    27-31  

 

 Mean Corpuscular HGB Conc  33 g/dL    31-36  

 

 Red Cell Distribution Width  21 %  High  10.5-15  

 

 Platelet Count  62 10^3/uL  Low  150-450  

 

 Mean Platelet Volume  9 um3    7.4-10.4  

 

 Abs Neutrophils  1.11 10^3/uL  Low  1.5-7.7  

 

 Abs Lymphocytes  0.21 10^3/uL  Low  1.0-4.8  

 

 Abs Monocytes  0.07 10^3/uL    0-0.8  

 

 Abs Eosinophils  0.01 10^3/uL    0-0.6  

 

 Abs Basophils  0 10^3/uL    0-0.2  

 

 Abs Nucleated RBC  0 10^3/uL      









 Manual Differential  10/09/2017  Immature Granulocytes  2 %    0-9  









 Neutrophil %  77 %    38-83  

 

 Band %  2 %    0-8  

 

 Lymphocytes %  14 %  Low  25-47  

 

 Monocytes %  5 %    0-13  

 

 Eosinophils %  1 %    0-6  

 

 Reactive Lymph %  1 %    0-6  

 

 RBC Morphology  Normal    Normal  









 Laboratory test finding  10/09/2017  Pathologist Review  (SEE NOTE)      31

 

 Urinalysis Profile  2017  Urine Color  Yellow      









 Urine Appearance  Clear      

 

 Urine Specific Gravity  1.014    1.010-1.030  

 

 Urine pH  6.0    5-9  

 

 Urine Urobilinogen  Negative    Negative  

 

 Urine Ketones  2+    Negative  

 

 Urine Protein  Negative    Negative  

 

 Urine Leukocytes  Negative    Negative  

 

 Urine Blood  1+    Negative  

 

 Urine Nitrite  Positive    Negative  

 

 Urine Bilirubin  Negative    Negative  

 

 Urine Glucose  Negative    Negative  

 

 Urine White Blood Cell  Trace(0-5/hpf)    Absent  

 

 Urine Red Blood Cell  Trace(0-2/hpf)    Absent  

 

 Urine Bacteria  Absent    Absent  

 

 Urine Squamous Epithelial Cell  Present    Absent  









 Urine Culture And  2017  Urine Culture  SEE RESULT BELOW      32



 Sensitivities            

 

 Laboratory test finding  2017  Lactic Acid  0.8 mmol/L    0.5-2.0  33

 

 CBC Auto Diff  2017  White Blood Count  0.2 10^3/uL  Low  3.5-10.8  









 Red Blood Count  3.98 10^6/uL  Low  4.0-5.4  

 

 Hemoglobin  10.5 g/dL  Low  12.0-16.0  

 

 Hematocrit  32 %  Low  35-47  

 

 Mean Corpuscular Volume  79 fL  Low  80-97  

 

 Mean Corpuscular Hemoglobin  26 pg  Low  27-31  

 

 Mean Corpuscular HGB Conc  33 g/dL    31-36  

 

 Red Cell Distribution Width  14 %    10.5-15  

 

 Platelet Count  52 10^3/uL  Low  150-450  

 

 Mean Platelet Volume  8 um3    7.4-10.4  

 

 Abs Neutrophils  0.1 10^3/uL  Low  1.5-7.7  34

 

 Abs Lymphocytes  0.1 10^3/uL  Low  1.0-4.8  

 

 Abs Monocytes  0.1 10^3/uL    0-0.8  

 

 Abs Eosinophils  0 10^3/uL    0-0.6  

 

 Abs Basophils  0 10^3/uL    0-0.2  

 

 Abs Nucleated RBC  0 10^3/uL      

 

 Granulocyte %  24.5 %  Low  38-83  

 

 Lymphocyte %  31.0 %    25-47  

 

 Monocyte %  34.2 %  High  1-9  

 

 Eosinophil %  9.3 %  High  0-6  

 

 Basophil %  1.0 %    0-2  

 

 Nucleated Red Blood Cells %  0.3      









 Laboratory test finding  2017  Partial Thrombo Time  58.4 seconds  High  
26.0-36.3  



     PTT        

 

 Comp Metabolic Panel  2017  Sodium  133 mmol/L    133-145  









 Potassium  3.8 mmol/L    3.5-5.0  

 

 Chloride  99 mmol/L  Low  101-111  

 

 Co2 Carbon Dioxide  26 mmol/L    22-32  

 

 Anion Gap  8 mmol/L    2-11  

 

 Glucose  94 mg/dL      

 

 Blood Urea Nitrogen  18 mg/dL    6-24  

 

 Creatinine  0.63 mg/dL    0.51-0.95  

 

 BUN/Creatinine Ratio  28.6  High  8-20  

 

 Calcium  8.9 mg/dL    8.6-10.3  

 

 Total Protein  5.0 g/dL  Low  6.4-8.9  

 

 Albumin  3.1 g/dL  Low  3.2-5.2  

 

 Globulin  1.9 g/dL  Low  2-4  

 

 Albumin/Globulin Ratio  1.6    1-3  

 

 Total Bilirubin  1.20 mg/dL  High  0.2-1.0  

 

 Alkaline Phosphatase  47 U/L      

 

 Alt  9 U/L    7-52  

 

 Ast  10 U/L  Low  13-39  

 

 Egfr Non-  92.1    &gt;60  

 

 Egfr   118.5    &gt;60  35









 Laboratory test finding  2017  Lipase  &lt; 10 U/L  Low  11.0-82.0  









 Troponin-I (TnI)  0.00 ng/mL    &lt;0.04  









 Urinalysis Profile  2017  Urine Color  Yellow      









 Urine Appearance  Clear      

 

 Urine Specific Gravity  1.048  High  1.010-1.030  

 

 Urine pH  5.0    5-9  

 

 Urine Urobilinogen  Negative    Negative  

 

 Urine Ketones  Trace    Negative  

 

 Urine Protein  Negative    Negative  

 

 Urine Leukocytes  Negative    Negative  

 

 Urine Blood  Negative    Negative  

 

 Urine Nitrite  Negative    Negative  

 

 Urine Bilirubin  Negative    Negative  

 

 Urine Glucose  Negative    Negative  









 CBC Auto Diff  2017  White Blood Count  6.7 10^3/uL    3.5-10.8  









 Red Blood Count  4.55 10^6/uL    4.0-5.4  

 

 Hemoglobin  12.0 g/dL    12.0-16.0  

 

 Hematocrit  37 %    35-47  

 

 Mean Corpuscular Volume  81 fL    80-97  

 

 Mean Corpuscular Hemoglobin  26 pg  Low  27-31  

 

 Mean Corpuscular HGB Conc  32 g/dL    31-36  

 

 Red Cell Distribution Width  14 %    10.5-15  

 

 Platelet Count  291 10^3/uL    150-450  

 

 Mean Platelet Volume  7 um3  Low  7.4-10.4  

 

 Abs Neutrophils  5.6 10^3/uL    1.5-7.7  

 

 Abs Lymphocytes  0.3 10^3/uL  Low  1.0-4.8  

 

 Abs Monocytes  0.7 10^3/uL    0-0.8  

 

 Abs Eosinophils  0 10^3/uL    0-0.6  

 

 Abs Basophils  0 10^3/uL    0-0.2  

 

 Abs Nucleated RBC  0 10^3/uL      

 

 Granulocyte %  84.1 %  High  38-83  

 

 Lymphocyte %  5.0 %  Low  25-47  

 

 Monocyte %  10.0 %  High  1-9  

 

 Eosinophil %  0.5 %    0-6  

 

 Basophil %  0.4 %    0-2  

 

 Nucleated Red Blood Cells %  0.1      









 Laboratory test finding  2017  Lactic Acid  0.8 mmol/L    0.5-2.0  36

 

 Inr/Protime  2017  Inr  0.95    0.89-1.11  

 

 Laboratory test finding  2017  Partial Thrombo Time  31.7 seconds    26.0
-36.3  



     PTT        

 

 Comp Metabolic Panel  2017  Sodium  137 mmol/L    133-145  









 Potassium  4.0 mmol/L    3.5-5.0  

 

 Chloride  103 mmol/L    101-111  

 

 Co2 Carbon Dioxide  26 mmol/L    22-32  

 

 Anion Gap  8 mmol/L    2-11  

 

 Glucose  89 mg/dL      

 

 Blood Urea Nitrogen  21 mg/dL    6-24  

 

 Creatinine  1.02 mg/dL  High  0.51-0.95  

 

 BUN/Creatinine Ratio  20.6  High  8-20  

 

 Calcium  9.1 mg/dL    8.6-10.3  

 

 Total Protein  5.9 g/dL  Low  6.4-8.9  

 

 Albumin  3.1 g/dL  Low  3.2-5.2  

 

 Globulin  2.8 g/dL    2-4  

 

 Albumin/Globulin Ratio  1.1    1-3  

 

 Total Bilirubin  0.50 mg/dL    0.2-1.0  

 

 Alkaline Phosphatase  62 U/L      

 

 Alt  14 U/L    7-52  

 

 Ast  22 U/L    13-39  

 

 Egfr Non-  52.8    &gt;60  

 

 Egfr   67.9    &gt;60  37









 Laboratory test finding  2017  Magnesium  1.9 mg/dL    1.9-2.7  









 Lipase  13 U/L    11.0-82.0  

 

 C Reactive Protein  20.35 mg/L  High  &lt; 5.00  38

 

 Troponin-I (TnI)  0.01 ng/mL    &lt;0.04  

 

 TSH (Thyroid Stim Horm)  1.88 mcIU/mL    0.34-5.60  

 

 Folic Acid (Folate)  10.14 ng/mL    &gt;3.99  

 

 Vitamin B12  407 pg/mL    180-914  39









 CBC Auto Diff  2017  White Blood Count  9.7 10^3/uL    3.5-10.8  









 Red Blood Count  4.51 10^6/uL    4.0-5.4  

 

 Hemoglobin  12.3 g/dL    12.0-16.0  

 

 Hematocrit  38 %    35-47  

 

 Mean Corpuscular Volume  83 fL    80-97  

 

 Mean Corpuscular Hemoglobin  27 pg    27-31  

 

 Mean Corpuscular HGB Conc  33 g/dL    31-36  

 

 Red Cell Distribution Width  13 %    10.5-15  

 

 Platelet Count  182 10^3/uL    150-450  

 

 Mean Platelet Volume  9 um3    7.4-10.4  

 

 Abs Neutrophils  7.9 10^3/uL  High  1.5-7.7  

 

 Abs Lymphocytes  0.4 10^3/uL  Low  1.0-4.8  

 

 Abs Monocytes  1.3 10^3/uL  High  0-0.8  

 

 Abs Eosinophils  0 10^3/uL    0-0.6  

 

 Abs Basophils  0 10^3/uL    0-0.2  

 

 Abs Nucleated RBC  0 10^3/uL      

 

 Granulocyte %  81.7 %    38-83  

 

 Lymphocyte %  4.3 %  Low  25-47  

 

 Monocyte %  13.5 %  High  1-9  

 

 Eosinophil %  0.2 %    0-6  

 

 Basophil %  0.3 %    0-2  

 

 Nucleated Red Blood Cells %  0      









 Laboratory test finding  2017  B-Type Natriuretic Peptide  45 pg/mL      
40



     BNP        

 

 Comp Metabolic Panel  2017  Sodium  133 mmol/L    133-145  









 Potassium  4.0 mmol/L    3.5-5.0  

 

 Chloride  101 mmol/L    101-111  

 

 Co2 Carbon Dioxide  21 mmol/L  Low  22-32  

 

 Anion Gap  11 mmol/L    2-11  

 

 Glucose  106 mg/dL  High    

 

 Blood Urea Nitrogen  25 mg/dL  High  6-24  

 

 Creatinine  0.95 mg/dL    0.51-0.95  

 

 BUN/Creatinine Ratio  26.3  High  8-20  

 

 Calcium  9.5 mg/dL    8.6-10.3  

 

 Total Protein  6.4 g/dL    6.4-8.9  

 

 Albumin  3.7 g/dL    3.2-5.2  

 

 Globulin  2.7 g/dL    2-4  

 

 Albumin/Globulin Ratio  1.4    1-3  

 

 Total Bilirubin  1.30 mg/dL  High  0.2-1.0  

 

 Alkaline Phosphatase  61 U/L      

 

 Alt  9 U/L    7-52  

 

 Ast  22 U/L    13-39  

 

 Egfr Non-  57.3    &gt;60  

 

 Egfr   73.8    &gt;60  41









 Laboratory test  2017  Magnesium  1.7 mg/dL  Low  1.9-2.7  



 finding            

 

 Inr/Protime  2017  Inr  1.02    0.89-1.11  

 

 Laboratory test  2017  D Dimer Quantitative  757 ng/mL  High  Less Than 
230  42



 finding            









 Lactic Acid  0.7 mmol/L    0.5-2.0  43









 Laboratory test finding  2017  Vitamin D Total 25(Oh)  15.2 ng/mL  Low  
30-50  

 

 Pthi  2017  Calcium (PTH Intact)  10.0 mg/dL    8.6-10.3  









 PTH Intact  1.6 pmol/L    1.3-9.3  









 Laboratory test finding  2017  Glucose  86 mg/dL      









 Hemoglobin A1c (Glyco HGB)  5.6 %    Less than 6.0  44









 Comp Metabolic Panel  2017  Sodium  140 mmol/L    133-145  









 Potassium  4.0 mmol/L    3.5-5.0  

 

 Chloride  105 mmol/L    101-111  

 

 Co2 Carbon Dioxide  26 mmol/L    22-32  

 

 Anion Gap  9 mmol/L    2-11  

 

 Glucose  85 mg/dL      

 

 Blood Urea Nitrogen  24 mg/dL    6-24  

 

 Creatinine  1.04 mg/dL  High  0.51-0.95  

 

 BUN/Creatinine Ratio  23.1  High  8-20  

 

 Calcium  10.0 mg/dL    8.6-10.3  

 

 Total Protein  6.2 g/dL  Low  6.4-8.9  

 

 Albumin  4.0 g/dL    3.2-5.2  

 

 Globulin  2.2 g/dL    2-4  

 

 Albumin/Globulin Ratio  1.8    1-3  

 

 Total Bilirubin  0.80 mg/dL    0.2-1.0  

 

 Alkaline Phosphatase  64 U/L      

 

 Alt  9 U/L    7-52  

 

 Ast  23 U/L    13-39  

 

 Egfr Non-  51.7    &gt;60  

 

 Egfr   66.4    &gt;60  45









 Laboratory test finding  2017  TSH (Thyroid Stim Horm)  2.29 mcIU/mL    
0.34-5.60  

 

 CBC Auto Diff  2017  White Blood Count  7.6 10^3/uL    3.5-10.8  









 Red Blood Count  4.78 10^6/uL    4.0-5.4  

 

 Hemoglobin  13.1 g/dL    12.0-16.0  

 

 Hematocrit  40 %    35-47  

 

 Mean Corpuscular Volume  84 fL    80-97  

 

 Mean Corpuscular Hemoglobin  27 pg    27-31  

 

 Mean Corpuscular HGB Conc  32 g/dL    31-36  

 

 Red Cell Distribution Width  13 %    10.5-15  

 

 Platelet Count  180 10^3/uL    150-450  

 

 Mean Platelet Volume  9 um3    7.4-10.4  

 

 Abs Neutrophils  5.9 10^3/uL    1.5-7.7  

 

 Abs Lymphocytes  0.7 10^3/uL  Low  1.0-4.8  

 

 Abs Monocytes  0.9 10^3/uL  High  0-0.8  

 

 Abs Eosinophils  0 10^3/uL    0-0.6  

 

 Abs Basophils  0.1 10^3/uL    0-0.2  

 

 Abs Nucleated RBC  0.01 10^3/uL      

 

 Granulocyte %  77.4 %    38-83  

 

 Lymphocyte %  9.1 %  Low  25-47  

 

 Monocyte %  12.3 %  High  1-9  

 

 Eosinophil %  0.5 %    0-6  

 

 Basophil %  0.7 %    0-2  

 

 Nucleated Red Blood Cells %  0.1      









 Laboratory test finding  2017   (Ovarian  1893.7 U/mL  High  0-35  
46



     Cancer Ag)        

 

 Order  2017  EKG  &lt;pending&gt;      

 

 Urine Culture And  2017  Urine Culture  SEE RESULT BELOW      47



 Sensitivities            

 

 Ua Routine  2017  Ua Specific Gravity  1.015      









 Ua PH  5      

 

 Ua Color  cloudy      

 

 Ua Appera  red in color      

 

 Ua WBC  ++      

 

 Ua Protein  +++      

 

 Ua Glucose  noraml      

 

 Ua Ketones  negative      

 

 Ua Bilirubin  negative      

 

 Ua Urobilinogen  normal      

 

 Ua Nitrite  positive      

 

 Ua Occult Blood  large      









 Urine Culture And  2016  Urine Culture  SEE RESULT BELOW      48



 Sensitivities            

 

 Ua Routine  2016  Ua Specific Gravity  1.020      









 Ua PH  5      

 

 Ua Color  yellow      

 

 Ua Appera  cloudy      

 

 Ua WBC  small      

 

 Ua Protein  neg      

 

 Ua Glucose  neg      

 

 Ua Ketones  neg      

 

 Ua Bilirubin  neg      

 

 Ua Urobilinogen  neg      

 

 Ua Nitrite  neg      

 

 Ua Occult Blood  small      









 Laboratory test finding  2016  Lyme Disease Serology  Positive    
Negative  49

 

 Lyme Western Blot  2016  Lyme Disease IgG Ab WB  Positive    Negative  









 Lyme Disease IgG Bands Present  See Comment kDa      50

 

 Lyme Disease IgM Ab WB  Positive    Negative  

 

 Lyme Disease IgM Bands Present  p41, p39, kDa      

 

 Lyme Disease Interpretation  See Comment      51









 Lipid Profile (Trig/Chol/HDL)  2016  Triglycerides  67 mg/dL      52









 Cholesterol  179 mg/dL      53

 

 HDL Cholesterol  34.9 mg/dL      54

 

 LDL Cholesterol  131 mg/dL      55









 Basic Metabolic Panel  2016  Sodium  138 mmol/L    133-145  









 Potassium  4.2 mmol/L    3.5-5.0  

 

 Chloride  104 mmol/L    101-111  

 

 Co2 Carbon Dioxide  30 mmol/L    22-32  

 

 Anion Gap  4 mmol/L    2-11  

 

 Glucose  106 mg/dL  High    

 

 Blood Urea Nitrogen  24 mg/dL    6-24  

 

 Creatinine  0.91 mg/dL    0.51-0.95  

 

 BUN/Creatinine Ratio  26.4  High  8-20  

 

 Calcium  9.8 mg/dL    8.6-10.3  

 

 Egfr Non-  60.4    &gt;60  

 

 Egfr   77.7    &gt;60  56









 Lyme Western Blot  2016  Lyme Disease IgG Ab WB  Negative    Negative  









 Lyme Disease IgG Bands Present  p66, p41, p23, kDa      

 

 Lyme Disease IgM Ab WB  Positive    Negative  

 

 Lyme Disease IgM Bands Present  p41, p23, kDa      

 

 Lyme Disease Interpretation  See Comment      57









 Laboratory test finding  2016  Erythrocyte Sed Rate  29 mm/Hr    0-40  









 Lyme Disease Serology  Positive    Negative  58









 CBC Auto Diff  2016  White Blood Count  4.7 10^3/uL    3.5-10.8  









 Red Blood Count  4.92 10^6/uL    4.0-5.4  

 

 Hemoglobin  13.2 g/dL    12.0-16.0  

 

 Hematocrit  40 %    35-47  

 

 Mean Corpuscular Volume  82 fL    80-97  

 

 Mean Corpuscular Hemoglobin  27 pg    27-31  

 

 Mean Corpuscular HGB Conc  33 g/dL    31-36  

 

 Red Cell Distribution Width  14 %    10.5-15  

 

 Platelet Count  137 10^3/uL  Low  150-450  

 

 Mean Platelet Volume  8 um3    7.4-10.4  

 

 Abs Neutrophils  3.5 10^3/uL    1.5-7.7  

 

 Abs Lymphocytes  0.5 10^3/uL  Low  1.0-4.8  

 

 Abs Monocytes  0.7 10^3/uL    0-0.8  

 

 Abs Eosinophils  0 10^3/uL    0-0.6  

 

 Abs Basophils  0 10^3/uL    0-0.2  

 

 Abs Nucleated RBC  0 10^3/uL      

 

 Granulocyte %  74.4 %    38-83  

 

 Lymphocyte %  9.9 %  Low  25-47  

 

 Monocyte %  14.6 %  High  1-9  

 

 Eosinophil %  0.5 %    0-6  

 

 Basophil %  0.6 %    0-2  

 

 Nucleated Red Blood Cells %  0.1      









 Laboratory test finding  2016  C Reactive Protein  5.03 mg/L  High  &lt; 
5.00  59









 1  *******Because ethnic data is not always readily available,



   this report includes an eGFR for both -Americans and



   non- Americans.****



   The National Kidney Disease Education Program (NKDEP) does



   not endorse the use of the MDRD equation for patients that



   are not between the ages of 18 and 70, are pregnant, have



   extremes of body size, muscle mass, or nutritional status,



   or are non- or non-.



   According to the [employer], irrespective of



   diagnosis, the stage of the disease is based on the level of



   kidney function:



   Stage Description                      GFR(mL/min/1.73 m(2))



   1     Kidney damage with normal or decreased GFR       90



   2     Kidney damage with mild decrease in GFR          60-89



   3     Moderate decrease in GFR                         30-59



   4     Severe decrease in GFR                           15-29



   5     Kidney failure                       &lt;15 (or dialysis)

 

 2  *******Because ethnic data is not always readily available,



   this report includes an eGFR for both -Americans and



   non- Americans.****



   The National Kidney Disease Education Program (NKDEP) does



   not endorse the use of the MDRD equation for patients that



   are not between the ages of 18 and 70, are pregnant, have



   extremes of body size, muscle mass, or nutritional status,



   or are non- or non-.



   According to the National Kidney Foundation, irrespective of



   diagnosis, the stage of the disease is based on the level of



   kidney function:



   Stage Description                      GFR(mL/min/1.73 m(2))



   1     Kidney damage with normal or decreased GFR       90



   2     Kidney damage with mild decrease in GFR          60-89



   3     Moderate decrease in GFR                         30-59



   4     Severe decrease in GFR                           15-29



   5     Kidney failure                       &lt;15 (or dialysis)

 

 3  Pancytopenia with absolute neutropenia, normocytic anemia,



   and marked thrombocytopenia.  No evidence of a hemolytic



   process.



   



   Reviewed by Larissa Lara MD

 

 4  SEE RESULT BELOW



   -----------------------------------------------------------------------------
---------------



   Name:  SUELLEN PATEL                 : 1940    Attend Dr: Vero Person MD



   Acct:  [id number]  Unit: [id number]  AGE: 77            Location:  [place] [address]-



   Re17                        SEX: F             Status:    ADM IN



   -----------------------------------------------------------------------------
---------------



   



   SPEC: [id number]         PARRISH:       [facility] DR: Barber Odom MD



   REQ:  [id number]              RECD:   



   STATUS: RES              OTHR DR: Prachi Hansen MD



   _



   SOURCE: BLOOD,VENO     SPDESC:



   ORDERED:  Blood Cult



   COMMENTS: COLLECTED FROM LAC



   



   -----------------------------------------------------------------------------
---------------



   Procedure                         Result                         Reported   
        Site



   -----------------------------------------------------------------------------
---------------



   Aerobic Culture Bottle  Preliminary                              17-
1425      ML



   No Growth Day 1



   



   Anaerobic Culture Bottle  Preliminary                            17-
1425      ML



   No Growth Day 1



   



   -----------------------------------------------------------------------------
---------------



   * ML - MAIN LAB ([facility])



   .



   



   



   



   



   



   



   



   



   



   



   



   



   



   



   



   



   



   



   



   



   



   



   



   ** END OF REPORT **



   



   * ML=Testing performed at Main Lab



   DEPARTMENT OF PATHOLOGY,  [address] [place] [address]



   Phone # [phone]      Fax #[phone]



   Luis Jung M.D. Director     [facility] # [id number]

 

 5  Differential performed on buffy coat.

 

 6  Verbal to [id number] by [id number] at 1534 on 17.



   Results read back accurately.

 

 7  Result TnIDx:0.07 Called to [id number] at: 14:46:42 by:[email or web address] Read back by:
[email or web address]

 

 8  *******Because ethnic data is not always readily available,



   this report includes an eGFR for both -Americans and



   non- Americans.****



   The National Kidney Disease Education Program (NKDEP) does



   not endorse the use of the MDRD equation for patients that



   are not between the ages of 18 and 70, are pregnant, have



   extremes of body size, muscle mass, or nutritional status,



   or are non- or non-.



   According to the National Kidney Foundation, irrespective of



   diagnosis, the stage of the disease is based on the level of



   kidney function:



   Stage Description                      GFR(mL/min/1.73 m(2))



   1     Kidney damage with normal or decreased GFR       90



   2     Kidney damage with mild decrease in GFR          60-89



   3     Moderate decrease in GFR                         30-59



   4     Severe decrease in GFR                           15-29



   5     Kidney failure                       &lt;15 (or dialysis)

 

 9  Please note the change in INR reference range effective



   17.

 

 10  [facility] Severe Sepsis and Septic Shock Management Bundle Measure



   requires all lactic acids initially measuring &gt;2.0 mmol/L be



   repeated.

 

 11  [facility] Severe Sepsis and Septic Shock Management Bundle Measure



   requires all lactic acids initially measuring &gt;2.0 mmol/L be



   repeated.

 

 12  *******Because ethnic data is not always readily available,



   this report includes an eGFR for both -Americans and



   non- Americans.****



   The National Kidney Disease Education Program (NKDEP) does



   not endorse the use of the MDRD equation for patients that



   are not between the ages of 18 and 70, are pregnant, have



   extremes of body size, muscle mass, or nutritional status,



   or are non- or non-.



   According to the National Kidney Foundation, irrespective of



   diagnosis, the stage of the disease is based on the level of



   kidney function:



   Stage Description                      GFR(mL/min/1.73 m(2))



   1     Kidney damage with normal or decreased GFR       90



   2     Kidney damage with mild decrease in GFR          60-89



   3     Moderate decrease in GFR                         30-59



   4     Severe decrease in GFR                           15-29



   5     Kidney failure                       &lt;15 (or dialysis)

 

 13  *******Because ethnic data is not always readily available,



   this report includes an eGFR for both -Americans and



   non- Americans.****



   The National Kidney Disease Education Program (NKDEP) does



   not endorse the use of the MDRD equation for patients that



   are not between the ages of 18 and 70, are pregnant, have



   extremes of body size, muscle mass, or nutritional status,



   or are non- or non-.



   According to the National Kidney Foundation, irrespective of



   diagnosis, the stage of the disease is based on the level of



   kidney function:



   Stage Description                      GFR(mL/min/1.73 m(2))



   1     Kidney damage with normal or decreased GFR       90



   2     Kidney damage with mild decrease in GFR          60-89



   3     Moderate decrease in GFR                         30-59



   4     Severe decrease in GFR                           15-29



   5     Kidney failure                       &lt;15 (or dialysis)

 

 14  Result TnIDx:0.07 Called to [id number] at: 14:50:10 by:[id number] Read back by:
[id number]

 

 15  [facility] Severe Sepsis and Septic Shock Management Bundle Measure



   requires all lactic acids initially measuring &gt;2.0 mmol/L be



   repeated.

 

 16  &gt;100 to &lt;200 pg/mL: likely compensated congestive heart



   failure (CHF)



   200 to 400 pg/mL: likely moderate CHF



   &gt;400 pg/mL: likely moderate to severe CHF

 

 17  *******Because ethnic data is not always readily available,



   this report includes an eGFR for both -Americans and



   non- Americans.****



   The National Kidney Disease Education Program (NKDEP) does



   not endorse the use of the MDRD equation for patients that



   are not between the ages of 18 and 70, are pregnant, have



   extremes of body size, muscle mass, or nutritional status,



   or are non- or non-.



   According to the National Kidney Foundation, irrespective of



   diagnosis, the stage of the disease is based on the level of



   kidney function:



   Stage Description                      GFR(mL/min/1.73 m(2))



   1     Kidney damage with normal or decreased GFR       90



   2     Kidney damage with mild decrease in GFR          60-89



   3     Moderate decrease in GFR                         30-59



   4     Severe decrease in GFR                           15-29



   5     Kidney failure                       &lt;15 (or dialysis)

 

 18  Result TnIDx:0.07 Called to [id number] at: 10:48:09 by:[id number] Read back by:
[id number]

 

 19  Leukocytosis with absolute neutrophilia and moderate



   thrombocytopenia noted.  Reactive/inflammatory process is



   favored.  Additional studies should be considered as



   clinically warranted.

 

 20  [id number]

 

 21  [id number]

 

 22  [id number]

 

 23  Consistent with previous results on 17.

 

 24  *******Because ethnic data is not always readily available,



   this report includes an eGFR for both -Americans and



   non- Americans.****



   The National Kidney Disease Education Program (NKDEP) does



   not endorse the use of the MDRD equation for patients that



   are not between the ages of 18 and 70, are pregnant, have



   extremes of body size, muscle mass, or nutritional status,



   or are non- or non-.



   According to the National Kidney Foundation, irrespective of



   diagnosis, the stage of the disease is based on the level of



   kidney function:



   Stage Description                      GFR(mL/min/1.73 m(2))



   1     Kidney damage with normal or decreased GFR       90



   2     Kidney damage with mild decrease in GFR          60-89



   3     Moderate decrease in GFR                         30-59



   4     Severe decrease in GFR                           15-29



   5     Kidney failure                       &lt;15 (or dialysis)

 

 25  *******Because ethnic data is not always readily available,



   this report includes an eGFR for both -Americans and



   non- Americans.****



   The National Kidney Disease Education Program (NKDEP) does



   not endorse the use of the MDRD equation for patients that



   are not between the ages of 18 and 70, are pregnant, have



   extremes of body size, muscle mass, or nutritional status,



   or are non- or non-.



   According to the National Kidney Foundation, irrespective of



   diagnosis, the stage of the disease is based on the level of



   kidney function:



   Stage Description                      GFR(mL/min/1.73 m(2))



   1     Kidney damage with normal or decreased GFR       90



   2     Kidney damage with mild decrease in GFR          60-89



   3     Moderate decrease in GFR                         30-59



   4     Severe decrease in GFR                           15-29



   5     Kidney failure                       &lt;15 (or dialysis)

 

 26  &gt;100 to &lt;200 pg/mL: likely compensated congestive heart



   failure (CHF)



   200 to 400 pg/mL: likely moderate CHF



   &gt;400 pg/mL: likely moderate to severe CHF

 

 27  Critical Result LACT:2.2 Called to [id number] at: 08:48:48 by:[id number] Read 
back



   by:[id number]



   [facility] Severe Sepsis and Septic Shock Management Bundle Measure



   requires all lactic acids initially measuring &gt;2.0 mmol/L be



   repeated.

 

 28  SEE RESULT BELOW



   -----------------------------------------------------------------------------
---------------



   Name:  SUELLEN PATEL                 : 1940    Attend Dr: Lukas Chicas MD



   Acct:  [id number]  Unit: [id number]  AGE: 77            Location:  [place] [address]



   Reg:   10/29/17                        SEX: F             Status:    ADM IN



   -----------------------------------------------------------------------------
---------------



   



   SPEC: [id number]         PARRISH:   10/29/    [facility] DR: Amos Garland MD



   REQ:  [id number]              RECD:   10/29/



   STATUS: RES              OTHR DR: Prachi Hansen MD



   _



   SOURCE: BLOOD,VENO     SPDESC:



   ORDERED:  Blood Cult



   COMMENTS: Patient is On Antibiotics? NO



   



   -----------------------------------------------------------------------------
---------------



   Procedure                         Result                         Reported   
        Site



   -----------------------------------------------------------------------------
---------------



   Aerobic Culture Bottle  Preliminary                              10/30/17-
0826      ML



   No Growth Day 1



   



   Anaerobic Culture Bottle  Preliminary                            10/30/17-
0826      ML



   No Growth Day 1



   



   -----------------------------------------------------------------------------
---------------



   * ML - MAIN LAB ([facility])



   .



   



   



   



   



   



   



   



   



   



   



   



   



   



   



   



   



   



   



   



   



   



   



   



   ** END OF REPORT **



   



   * ML=Testing performed at Main Lab



   DEPARTMENT OF PATHOLOGY,  [address] [place] [address]



   Phone # [phone]      Fax #[phone]



   Luis Jung M.D. Director     [facility] # [id number]

 

 29  Progressive normocytic anemia with thrombocytopenia noted.



   No schistocytes or blasts are identified.  Additional



   studies as clinically warranted.



   



   



   Reviewed by Dr. Jung

 

 30  *******Because ethnic data is not always readily available,



   this report includes an eGFR for both -Americans and



   non- Americans.****



   The National Kidney Disease Education Program (NKDEP) does



   not endorse the use of the MDRD equation for patients that



   are not between the ages of 18 and 70, are pregnant, have



   extremes of body size, muscle mass, or nutritional status,



   or are non- or non-.



   According to the National Kidney Foundation, irrespective of



   diagnosis, the stage of the disease is based on the level of



   kidney function:



   Stage Description                      GFR(mL/min/1.73 m(2))



   1     Kidney damage with normal or decreased GFR       90



   2     Kidney damage with mild decrease in GFR          60-89



   3     Moderate decrease in GFR                         30-59



   4     Severe decrease in GFR                           15-29



   5     Kidney failure                       &lt;15 (or dialysis)

 

 31  Pancytopenia with absolute neutropenia, normocytic anemia,



   and moderate thrombocytopenia.  No evidence of a hemolytic



   process.



   



   Reviewed by Larissa Lara MD

 

 32  SEE RESULT BELOW



   -----------------------------------------------------------------------------
---------------



   Name:  SUELLEN PATEL                 : 1940    Attend Dr: Paramjit Trujillo MD



   Acct:  [id number]  Unit: [id number]  AGE: 77            Location:  [address]



   Re17                        SEX: F             Status:    ADM Judit



   -----------------------------------------------------------------------------
---------------



   



   SPEC: [id number]         PARRISH:   0625    SUBM DR: Emmanuel Brown MD



   REQ:  [id number]              RECD:   



   STATUS: COMP             [facility] DR: Prachi Hansen MD



   _



   SOURCE: URINE          [facility]:



   ORDERED:  Urine Culture



   



   -----------------------------------------------------------------------------
---------------



   Procedure                         Result                         Reported   
        Site



   -----------------------------------------------------------------------------
---------------



   Urine Culture  Final                                             17-
852      ML



   



   No growth of clinically significant organisms



   



   -----------------------------------------------------------------------------
---------------



   * ML - MAIN LAB ([facility])



   .



   



   



   



   



   



   



   



   



   



   



   



   



   



   



   



   



   



   



   



   



   



   



   



   



   



   



   ** END OF REPORT **



   



   * ML=Testing performed at Main Lab



   DEPARTMENT OF PATHOLOGY,  [address] [place] [address]



   Phone # [phone]      Fax #[phone]



   Luis Jung M.D. Director     [facility] # [id number]

 

 33  [facility] Severe Sepsis and Septic Shock Management Bundle Measure



   requires all lactic acids initially measuring &gt;2.0 mmol/L be



   repeated.

 

 34  Verbal to [id number] by [id number] at 0311 on 17.



   Results read back accurately.

 

 35  *******Because ethnic data is not always readily available,



   this report includes an eGFR for both -Americans and



   non- Americans.****



   The National Kidney Disease Education Program (NKDEP) does



   not endorse the use of the MDRD equation for patients that



   are not between the ages of 18 and 70, are pregnant, have



   extremes of body size, muscle mass, or nutritional status,



   or are non- or non-.



   According to the National Kidney Foundation, irrespective of



   diagnosis, the stage of the disease is based on the level of



   kidney function:



   Stage Description                      GFR(mL/min/1.73 m(2))



   1     Kidney damage with normal or decreased GFR       90



   2     Kidney damage with mild decrease in GFR          60-89



   3     Moderate decrease in GFR                         30-59



   4     Severe decrease in GFR                           15-29



   5     Kidney failure                       &lt;15 (or dialysis)

 

 36  [facility] Severe Sepsis and Septic Shock Management Bundle Measure



   requires all lactic acids initially measuring &gt;2.0 mmol/L be



   repeated.

 

 37  *******Because ethnic data is not always readily available,



   this report includes an eGFR for both -Americans and



   non- Americans.****



   The National Kidney Disease Education Program (NKDEP) does



   not endorse the use of the MDRD equation for patients that



   are not between the ages of 18 and 70, are pregnant, have



   extremes of body size, muscle mass, or nutritional status,



   or are non- or non-.



   According to the National Kidney Foundation, irrespective of



   diagnosis, the stage of the disease is based on the level of



   kidney function:



   Stage Description                      GFR(mL/min/1.73 m(2))



   1     Kidney damage with normal or decreased GFR       90



   2     Kidney damage with mild decrease in GFR          60-89



   3     Moderate decrease in GFR                         30-59



   4     Severe decrease in GFR                           15-29



   5     Kidney failure                       &lt;15 (or dialysis)

 

 38  Acute inflammation:  &gt;10.00

 

 39  Normal Range 180 to 914



   Indeterminate Range 145 to 180



   Deficient Range  &lt;145

 

 40  &gt;100 to &lt;200 pg/mL: likely compensated congestive heart



   failure (CHF)



   200 to 400 pg/mL: likely moderate CHF



   &gt;400 pg/mL: likely moderate to severe CHF

 

 41  *******Because ethnic data is not always readily available,



   this report includes an eGFR for both -Americans and



   non- Americans.****



   The National Kidney Disease Education Program (NKDEP) does



   not endorse the use of the MDRD equation for patients that



   are not between the ages of 18 and 70, are pregnant, have



   extremes of body size, muscle mass, or nutritional status,



   or are non- or non-.



   According to the National Kidney Foundation, irrespective of



   diagnosis, the stage of the disease is based on the level of



   kidney function:



   Stage Description                      GFR(mL/min/1.73 m(2))



   1     Kidney damage with normal or decreased GFR       90



   2     Kidney damage with mild decrease in GFR          60-89



   3     Moderate decrease in GFR                         30-59



   4     Severe decrease in GFR                           15-29



   5     Kidney failure                       &lt;15 (or dialysis)

 

 42  **Please note:



   The following may produce a false positive D Dimer test:



   - Rheumatoid factor greater than 60 IU/ml



   - Plasma hemoglobin greater than 0.05 gm/dl



   - Bilirubin greater than 50 mg/dl



   - Lipids greater than 1000 mg/dl



   - FDP greater than 20 ug/ml

 

 43  [place] Severe Sepsis and Septic Shock Management Bundle Measure



   requires all lactic acids initially measuring &gt;2.0 mmol/L be



   repeated.

 

 44  Therapeutic target for the treatment of diabetes



   [place] patients is &lt;7% HBA1C, and in selective



   patients &lt;6.0%.Please refer to American Diabetes



   Association Diabetic care guidelines for further



   information.

 

 45  *******Because ethnic data is not always readily available,



   this report includes an eGFR for both -Americans and



   non- Americans.****



   The National Kidney Disease Education Program (NKDEP) does



   not endorse the use of the MDRD equation for patients that



   are not between the ages of 18 and 70, are pregnant, have



   extremes of body size, muscle mass, or nutritional status,



   or are non- or non-.



   According to the National Kidney Foundation, irrespective of



   diagnosis, the stage of the disease is based on the level of



   kidney function:



   Stage Description                      GFR(mL/min/1.73 m(2))



   1     Kidney damage with normal or decreased GFR       90



   2     Kidney damage with mild decrease in GFR          60-89



   3     Moderate decrease in GFR                         30-59



   4     Severe decrease in GFR                           15-29



   5     Kidney failure                       &lt;15 (or dialysis)

 

 46  Instrument used is [employer] . The assay is a



   two-site immunoenzymatic "sandwich" assay. Do not interpret



    levels as absolute evidence of the presence or the



   absence of malignant disease. Use in conjunction with



   information from the clinical evaluation of the patient and



   other diagnostic procedures. Values obtained with different



   assay methods cannot be used interchangeably.

 

 47  SEE RESULT BELOW



   -----------------------------------------------------------------------------
---------------



   Name:  SUELLEN PATEL                 : 1940    Attend Dr: Prachi Hansen MD



   Acct:  [id number]  Unit: [id number]  AGE: 76            Location:  [facility]



   Re17                        SEX: F             Status:    REG REF



   -----------------------------------------------------------------------------
---------------



   



   SPEC: [id number]         PARRISH:       [facility] DR: Prachi Hansen MD



   REQ:  [id number]              RECD:   1233



   STATUS: COMP



   _



   SOURCE: URINE          SPDESC:



   ORDERED:  Urine Culture



   Urine Source: Random



   



   -----------------------------------------------------------------------------
---------------



   Procedure                         Result                         Reported   
        Site



   -----------------------------------------------------------------------------
---------------



   Urine Culture  Final                                             17-
822      ML



   



   Organism 1                     ESCHERICHIA COLI



   [place] Count                &gt;100,000 (Many) CFU/ML



   



   1. ESCHERICHIA COLI



   M.I.C.      RX



   --------- ------



   Ampicillin                     &gt;=32        R



   Cefazolin                      &lt;=4         S



   Cefepime                       &lt;=1         S



   Ceftriaxone                    &lt;=1         S



   Ciprofloxacin                  &lt;=0.25      S



   Gentamicin                     &lt;=1         S



   Levofloxacin                   &lt;=0.12      S



   Meropenem                      &lt;=0.25      S



   Nitrofurantoin                 &lt;=16        S



   Tetracycline                   &lt;=1         S



   Pipercillin/Tazobactam         &lt;=4         S



   Trimethoprim/Sulfamethoxazole  &lt;=20        S



   Amoxicillin/Clavulanic Acid    8           S



   Aztreonam                      &lt;=1         S



   



   



   Contact the Microbiology Department for any additional



   antibiotic reporting.



   



   -----------------------------------------------------------------------------
---------------



   * ML - MAIN LAB ([facility])



   .



   



   ** END OF REPORT **



   



   * ML=Testing performed at Main Lab



   DEPARTMENT OF PATHOLOGY,  [address] [place] [address]



   Phone # [phone]      Fax #[phone]



   Luis Jung M.D. Director     [facility] # [id number]

 

 48  SEE RESULT BELOW



   -----------------------------------------------------------------------------
---------------



   Name:  SUELLEN PATEL                 : 1940    Attend Dr: 
Vikki Richardson NP



   Acct:  [id number]  Unit: [id number]  AGE: 76            Location:  [facility]



   Re16                        SEX: F             Status:    REG REF



   -----------------------------------------------------------------------------
---------------



   



   SPEC: [id number]         PARRISH:       SUBM DR: Vikki Richardson NP



   REQ:  [id number]              RECD:   



   STATUS: COMP



   _



   SOURCE: URINE          SPDESC:



   ORDERED:  Urine Culture



   COMMENTS: [id number]



   Urine Source: Random



   



   -----------------------------------------------------------------------------
---------------



   Procedure                         Result                         Reported   
        Site



   -----------------------------------------------------------------------------
---------------



   Urine Culture  Final                                             16-
906      ML



   



   Few Enterobacteriacae; possible contamination.



   



   -----------------------------------------------------------------------------
---------------



   * ML - MAIN LAB ([facility])



   .



   



   



   



   



   



   



   



   



   



   



   



   



   



   



   



   



   



   



   



   



   



   



   



   ** END OF REPORT **



   



   * ML=Testing performed at Main Lab



   DEPARTMENT OF PATHOLOGY,  [address] [place] [address]



   Phone # [phone]      Fax #[phone]



   Luis Jung M.D. Director     [facility] # [id number]

 

 49  Not diagnostic. Supplemental testing ordered by reflex.



   Test Performed by:



   [place] [address]



   : Justin Smith II, M.D., Ph.D.

 

 50  RESULT: p66, p41, p30, p23, p18,

 

 51  Consistent with active or previous infection for B.



   burgdorferi.



   IgM blot criteria is of diagnostic utility only during the



   first 4 weeks of early Lyme disease.



   -------------------ADDITIONAL INFORMATION-------------------



   CDC criteria require &gt;=5 bands for IgG or &gt;=2 bands for IgM



   for the Immunoblot to be considered positive. Bands



   (e.g.,p41) may be detected in patients without Lyme



   disease, and patterns not meeting the CDC criteria should



   be interpreted with caution.



   Immunoblot should be ordered only on specimens that are



   positive or equivocal by a FDA-licensed Lyme disease



   antibody screening test (e.g., EIA).



   Test Performed by:



   [place] [id number]



   : Justin Smith II, M.D., Ph.D.

 

 52  Desirable &lt;150



   Borderline high 150-199



   High 200-499



   Very High &gt;500

 

 53  Desirable &lt;200



   Borderline high 200-239



   High &gt;239

 

 54  Low &lt;40



   Desirable: 40-60



   High: &gt;60

 

 55  Desirable: &lt;100 mg/dL



   Near Optimal: 100-129 mg/dL



   Borderline High: 130-159 mg/dL



   High: 160-189 mg/dL



   Very High: &gt;189 mg/dL

 

 56  *******Because ethnic data is not always readily available,



   this report includes an eGFR for both -Americans and



   non- Americans.****



   The National Kidney Disease Education Program (NKDEP) does



   not endorse the use of the MDRD equation for patients that



   are not between the ages of 18 and 70, are pregnant, have



   extremes of body size, muscle mass, or nutritional status,



   or are non- or non-.



   According to the National Kidney Foundation, irrespective of



   diagnosis, the stage of the disease is based on the level of



   kidney function:



   Stage Description                      GFR(mL/min/1.73 m(2))



   1     Kidney damage with normal or decreased GFR       90



   2     Kidney damage with mild decrease in GFR          60-89



   3     Moderate decrease in GFR                         30-59



   4     Severe decrease in GFR                           15-29



   5     Kidney failure                       &lt;15 (or dialysis)

 

 57  Consistent with early infection with Borrelia burgdorferi.



   A new serum specimen should be submitted in 14-21 days to



   demonstrate seroconversion of IgG.



   IgM blot criteria is of diagnostic utility only during the



   first 4 weeks of early Lyme disease.



   -------------------ADDITIONAL INFORMATION-------------------



   CDC criteria require &gt;=5 bands for IgG or &gt;=2 bands for IgM



   for the Immunoblot to be considered positive. Bands



   (e.g.,p41) may be detected in patients without Lyme



   disease, and patterns not meeting the CDC criteria should



   be interpreted with caution.



   Immunoblot should be ordered only on specimens that are



   positive or equivocal by a FDA-licensed Lyme disease



   antibody screening test (e.g., EIA).



   Test Performed by:



   [place] [address]



   : Justin Smith II, M.D., Ph.D.

 

 58  Not diagnostic. Supplemental testing ordered by reflex.



   Test Performed by:



   [address] [address]



   : Justin Smith II, M.D., Ph.D.

 

 59  Acute inflammation:  &gt;10.00







Procedures







 Date  CPT Code  Description  Status

 

 03/15/2018  31787  EKG Tracing &amp; Interpretation  Completed

 

 2017  00549  EKG Tracing &amp; Interpretation  Completed

 

 10/30/2017  24270  ECHO Transthorasic Realtime 2D W Doppler &amp; Color  
Completed



     [facility]  

 

 10/30/2017  52079  EKG, Interpretation Only  Completed

 

 08/10/2017  51710  Nerve Conduction 05-06 Studies  Completed

 

 08/10/2017  38671  Needle Electromyography Complete, Five Or More Muscles  
Completed



     Studied  

 

 2017  35337  EKG Tracing &amp; Interpretation  Completed

 

 2017  01820  ECHO Transthoracic, Real-Time 2D With Doppler And Color  
Completed



     Flow  

 

 2011    Diabetic Foot Exam  Completed

 

 2010    Bone Mineral Density Test  Completed

 

 2010  63451  EKG Tracing &amp; Interpretation  Completed

 

 2009  69696  Holter Monitor Review (24 hr)dr ang &amp; nickie  
Completed



     only  

 

 07/15/2009  89086  EKG Tracing &amp; Interpretation  Completed

 

 2008    Mammogram  Completed

 

 2008  72986  EKG Tracing &amp; Interpretation  Completed

 

 2008  12803  EKG Tracing &amp; Interpretation  Completed







Encounters







 Type  Date  Location  Provider  CPT E/M  Dx

 

 Office Visit  2018 11:50a  [facility]  Prachi Hansen M.D.  
[address]126  Z23



     [place]      









 Z12.11

 

 Z12.31

 

 M85.89

 

 C82.90

 

 Z71.89

 

 Z92.21









 Office Visit  2017  2:49p  [facility],  [address]  
R50.81



     Assoc, Hospitalists  M.D.    









 D64.9

 

 D70.9

 

 D69.6









 [facility]49[facility]  [address]  
R50.81



     [facility]oc, Hospitalists  M.D.    









 D64.9

 

 D70.9

 

 D69.6









 Office Visit  2017  2:48p  [facility],  [address]58  
R50.81



     Assoc, Hospitalgordo CARTY    









 D64.9

 

 D70.9

 

 D69.6









 [facility]  [facility]2017  [facility]47[facility]  [place],  
[address]  R50.81



     Hospitalists  M.D.    









 D64.9

 

 D70.9

 

 D69.6









 [facility]  2017  2:47p  [facility]  
[address]  R50.81



     Hospitalists  M.D.    









 D70.9

 

 D64.9

 

 D69.6









 Office Visit  2017  2:46p  [facility]  [place],  
[address]  R50.81



     Hospitalists  M.D.    









 D70.9

 

 D64.9

 

 D69.6









 Office Visit  2017  2:45p  [facility]  Noreen Nicolas,  [address]  
R50.81



     Assoc,pc Hospitalists  NP    









 D70.9

 

 D64.9

 

 D69.6









 Office Visit  2017  3:52p  [facility]  Patito Hendrickson,  
[address]6919  I48.0



     Hospitalists  M.D.    









 R74.8

 

 D72.829









 Office Visit  2017  2:00p  [facility]  Jorge Alberto Willett M.D.  
48596  I48.0









 C82.98

 

 D61.810

 

 J90









 Office Visit  2017  3:44p  [facility]  Patito Madhavi,  [address]71  I48.91



     Assoc, Hospitalists  M.D.    









 C82.98

 

 D61.810

 

 D69.6









 Office Visit  2017  3:43p  [facility]  Patiot Boxhn,  [address]6432  I48.91



     Assoc, Hospitalists  M.D.    









 C82.98

 

 D61.810

 

 D69.6









 Office Visit  2017  4:26p  [facility]  Jorge Alberto Willett M.D.  
65852  I48.0









 C82.98

 

 J90

 

 I30.9

 

 D70.9

 

 D69.6









 Office Visit  2017  3:43p  [facility]  Michael Flood MD  [address]
  I48.91



     Hospitalists      









 C82.98

 

 D69.6

 

 D61.810









 Office Visit  10/31/2017  3:42p  [facility]  Michael Flood MD  [address]23
  I48.91



     Hospitalists      









 C82.98

 

 D61.810

 

 J90









 Office Visit  10/30/2017  3:42p  [facility]  Lukas Chicas MD  
57993  I48.91



     Hospitalists      









 D61.810

 

 C82.98

 

 J90









 Office Visit  10/29/2017  3:41p  [facility]  Erma Montes,  
47959  C82.98



     Hospitalists  M.DCapo    









 D64.81

 

 D69.6

 

 I48.91









 Office Visit  2017  1:55p  [facility] [place],  Paramjit Trujillo,  
[address]359  R11.2



     Hospitalists  M.DCapo    









 C82.90

 

 D61.818









 Office Visit  2017  1:55p  [facility] [place],  Valdez Marquez M.D.  
98290  R11.2



     Hospitalists      









 C82.90

 

 D61.818









 Office Visit  2017 10:25a  [facility]  Chandu Sanford,  
59292  G60.9



       MD    

 

 Office Visit  2017  8:11a  [facility] [place],  Maxi Clearwater,  
14607  C82.93



     Hospitalists  M.DCapo    









 R18.0

 

 R55

 

 N13.30









 Office Visit  08/10/2017 10:24a  [facility]  Chandu Sanford MD  
11149  G60.9









 R55

 

 R26.89









 Office Visit  08/10/2017  8:10a  [facility] [place],  Maxi Clearwater,  
03567  C82.93



     Hospitalists  M.DCapo    









 R55

 

 N13.30

 

 R18.0









 Office Visit  2017 10:24a  [facility]  Chandu Sanford MD  
[address]11  R26.89









 R42

 

 H53.8









 Office Visit  2017  8:09a  [facility] [place],  Maxi Clearwater,  
[address]64  C82.93



     Hospitalists  M.DCapo    









 R18.0

 

 N13.30

 

 R55









 Office Visit  2017 10:40a  [facility]  Prachi Hansen,  [address]
  R19.09



     - Rena CARTY    









 N13.39









 Office Visit  2017  1:00p  [facility]  Prachi Hansen M.D.  
17320  R14.0



     - Rena      









 F43.0

 

 R19.09

 

 N13.30

 

 R18.8









 Office Visit  2017  7:30a  [facility]  Prachi Hansen  [address]853
  Z00.00



     - Rena CARTY    









 I10

 

 M85.89

 

 R05

 

 R73.01

 

 N39.3

 

 Z12.39

 

 L57.0









 Office Visit  2017  1:20p  [facility]  Prachi Hansen M.D.  
66063  N39.0



     - [place]      









 R01.1

 

 R73.01









 Office Visit  2016 11:40a  [facility]  Vikki Richardson, N.P.  
92014  R35.0



     - [place]      









 N39.0









 Office Visit  2016  4:00p  [facility] -  Amos Joy,  
80857  R21



     Rena CARTY    









 R73.01

 

 Z23









 Office Visit  2016  8:30a  [facility]  Pedro Anand,  
44067  A69.20



     Infectious Diseases  M.D.    









 G47.01









 Office Visit  2016 10:20a  [facility]  Amos Joy,  
88464  A69.20



     - Rena CARTY    









 Z13.220

 

 Z13.1









 Office Visit  2016 10:00a  [facility]  Amos Joy,  
16745  R21



     - Rena CARTY    

 

 Office Visit  2016  4:20p  [facility]  Mani Ruvalcaba, [place]  [address]  
L03.114



     - [place]      

 

 Office Visit  2012 11:20a  [facility]  Vikki Richardson, N.P.  
43606  719.47



     - [place]      









 V04.81









 Office Visit  2011  1:20p  DO Not Use [facility]  Vikki Richardson,  
70402  729.4



       N.P.    

 

 Office Visit  2010 11:30a  DO Not Use [facility]  Ailyn Knight M.D.,  
34976  272.0



       FACP    









 790.21

 

 733.90









 Office Visit  2010  9:30a  DO Not Use [facility]  Ailyn Knight  79062
  724.2



       M.D., FACP    









 733.90

 

 272.0

 

 401.1

 

 v04.81









 Office Visit  2010  9:45a  DO Not Use [facility]  Ailyn Knight,  95363
  272.4



       M.D., FACP    









 719.45









 Office Visit  2010  9:00a  DO Not Use [facility]  Ailyn Knight  11421
  272.4



       M.D., FACP    









 401.1









 Office Visit  2010 10:45a  DO Not Use [facility]  Ailyn Antonia,  78017
  785.1



       M.D., FACP    









 272.4

 

 300.00

 

 311

 

 V04.81









 Office Visit  07/15/2009 11:45a  DO Not Use [facility]  Ailyn Antonia,  79680
  785.0



       M.D., FACP    









 785.1

 

 719.47









 Office Visit  2008  1:45p  DO Not Use [facility]  Ailyn Antonia,  42677
  272.4



       M.D., FACP    

 

 Office Visit  2008 11:15a  DO Not Use [facility]  Ailyn Antonia,  88817
  V72.31



       M.D., FACP    









 272.0

 

 401.1









 Office Visit  2007  1:30p  DO Not Use [facility]  Ailyn Antonia,  [address]346
  462



       M.D., FACP    

 

 Office Visit  2007 12:00p  DO Not Use [facility]  Ailyn Antonia,  91117
  372.30



       M.D., FACP    









 465.9









 Office Visit  2007  2:15p  DO Not Use [facility]  Ailyn Antonia,  10201
  289.3



       M.D., FACP    

 

 Office Visit  2006  2:30p  DO Not Use [facility]  Ailyn Antonia,  58841
  401.1



       M.D., FACP    









 V04.81









 Office Visit  10/03/2006  9:45a  DO Not Use [facility]  Ailyn Antonia,  43951
  272.0



       M.D., FACP    









 300.00







Plan of Care

Future Appointment(s):2018 11:50 am - Prachi Hansen M.D. at [facility] 
[facility] - [place]j41/15/2018 - Jorge Alberto Willett M.D.E83.42 
BmuysuroabtktcI78.0 Paroxysmal atrial fibrillationFollow up:ov 6 m

## 2018-04-11 NOTE — UC
Allergic Reaction HPI





- HPI Summary


HPI Summary: 





46 yo WF h/o follicular lymphoma s/p 6 cycles of chemo last December now in 

remission took 4 tabs of amoxicillin yesterday at noon and now feels mild voice 

change, laryngeal fullness and mild anterior tongue swelling. Never had this 

type of reaction in the past and not known to be allergic to amoxicillin but 

states that her dentist "told her to take it 4 at ONE time as a BOLUS"





- History of Current Complaint


Chief Complaint: UCAllergicReaction


Stated Complaint: POSSIBLE ALLERGIC REACTION


Time Seen by Provider: 04/11/18 12:51


Pain Intensity: 0


Pain Scale Used: 0-10 Numeric





- Allergies/Home Medications


Allergies/Adverse Reactions: 


 Allergies











Allergy/AdvReac Type Severity Reaction Status Date / Time


 


diphenhydramine Allergy  Agitation Verified 04/11/18 12:57





[From Benadryl]     


 


MS Sulfa Antibiotics AdvReac  See Comment Verified 08/09/17 11:37





[Sulfa Antibiotics]     


 


"some chemo drugs" Allergy  Unknown Uncoded 12/09/17 10:00





   Reaction  





   Details  











Home Medications: 


 Home Medications





Amoxicillin PO (*) [Amoxicillin 500 MG CAP*] 500 mg PO Q6H 04/11/18 [History 

Confirmed 04/11/18]


Digoxin [Digitek] 125 mcg PO DAILY 04/11/18 [History Confirmed 04/11/18]


Digoxin [Digitek] 250 mcg PO EVERY OTHER DAY 04/11/18 [History Confirmed 04/11/ 18]











PMH/Surg Hx/FS Hx/Imm Hx





- Additional Past Medical History


Additional PMH: 





follicular lymphoma in remission


Previously Healthy: Yes





- Surgical History


Surgical History: Yes


Surgery Procedure, Year, and Place: appy as "a teenager"





- Family History


Known Family History: 


   Negative: Cardiac Disease - denies family hx of, Diabetes





- Social History


Alcohol Use: None


Substance Use Type: Marijuana


Substance Use Comment - Amount & Last Used: legal


Smoking Status (MU): Never Smoked Tobacco





- Immunization History


Most Recent Influenza Vaccination: 2016


Most Recent Pneumonia Vaccination: up to date





Review of Systems


Constitutional: Negative


Skin: Negative


Eyes: Negative


ENT: Other - voice change and mild anterior tongue swelling


Respiratory: Negative


Cardiovascular: Negative


Gastrointestinal: Negative


Genitourinary: Negative


Motor: Negative


Neurovascular: Negative


Neurological: Negative


Psychological: Negative


All Other Systems Reviewed And Are Negative: Yes





Physical Exam


Triage Information Reviewed: Yes


Vital Signs: 


 Initial Vital Signs











Temp  37.3 C   04/11/18 13:01


 


Pulse  88   04/11/18 13:01


 


Resp  16   04/11/18 13:01


 


BP  107/60   04/11/18 13:01


 


Pulse Ox  97   04/11/18 13:01











Eye Exam: Normal


ENT Exam: Normal


ENT: Positive: Pharynx normal, Uvula midline, Other - no angioedema, mild 

swelling in anterior portion of tongue, phonation intact, no SOB.  Negative: 

Pharyngeal erythema, Nasal congestion


Dental Exam: Normal


Neck exam: Normal


Neck: Positive: 1


Respiratory Exam: Normal


Cardiovascular Exam: Normal


Abdominal Exam: Normal


Musculoskeletal Exam: Normal


Neurological Exam: Normal


Psychological Exam: Normal


Skin Exam: Normal





Allergic Reaction Course/Dx





- Course


Course Of Treatment: No current evidence or signs of angioedema, urticaria, 

bronchospasm, respiratory distress.  Pt is senstitive to a lot of meds after 

chemo and states her "body has changed after chemo and is not reacting to 

medication normally as she used to" so she declined trial of PO Benadryl.  

advised COPIOUS HYDRATION TO DILUTE ABX METABOLITES and urinate out the product





- Differential Dx/Diagnosis


Provider Diagnoses: adverse drug reaction





Discharge





- Sign-Out/Discharge


Documenting (check all that apply): Discharge





- Discharge Plan


Condition: Stable


Disposition: HOME


Patient Education Materials:  Adverse Drug Reaction (ED)


Referrals: 


Prachi Hansen MD [Primary Care Provider] - 


Additional Instructions: 


STOP taking amoxicilin and please go to ER if sensation of tongue swellling and 

change in voice persists COPIOUS AMOUNT OF WATER FOR THE NEXT 6 hrs





- Billing Disposition and Condition


Condition: STABLE


Disposition: HOME

## 2018-10-27 ENCOUNTER — HOSPITAL ENCOUNTER (EMERGENCY)
Dept: HOSPITAL 25 - UCEAST | Age: 78
Discharge: HOME | End: 2018-10-27
Payer: MEDICARE

## 2018-10-27 VITALS — DIASTOLIC BLOOD PRESSURE: 71 MMHG | SYSTOLIC BLOOD PRESSURE: 110 MMHG

## 2018-10-27 DIAGNOSIS — Z91.040: ICD-10-CM

## 2018-10-27 DIAGNOSIS — Z88.0: ICD-10-CM

## 2018-10-27 DIAGNOSIS — N39.0: Primary | ICD-10-CM

## 2018-10-27 DIAGNOSIS — Z88.8: ICD-10-CM

## 2018-10-27 DIAGNOSIS — Z91.041: ICD-10-CM

## 2018-10-27 DIAGNOSIS — Z88.2: ICD-10-CM

## 2018-10-27 PROCEDURE — G0463 HOSPITAL OUTPT CLINIC VISIT: HCPCS

## 2018-10-27 PROCEDURE — 99212 OFFICE O/P EST SF 10 MIN: CPT

## 2018-10-27 NOTE — XMS REPORT
Suellen Patel

 Created on:2018



Patient:Suellen Patel

Sex:Female

:1940

External Reference #:[id number]





Demographics







 Address  [address] [place] [address]

 

 Mobile Phone  [phone]

 

 Email Address  [email or web address]

 

 Preferred Language  English

 

 Marital Status  Not  Or 

 

 Adventist Affiliation  Unknown

 

 Race  White

 

 Ethnic Group  Not  Or 









Author







 Organization  [employer]

 

 Address  [address] [place] [address] B



   [place] [address]

 

 Phone  [phone]









Support







 Name  Relationship  Address  Phone

 

 THEA Patel  Unavailable  Unavailable  [phone]









Care Team Providers







 Name  Role  Phone

 

 Prachi Hansen MD  Primary Care Physician  Unavailable









Payers







 Type  Date  Identification Numbers  Payment Provider  Subscriber

 

 Health Maintenance    Policy Number:  Medicare Blue Ppo  Suellen Patel



 [facility] ([facility])    [id number]    









 Group Number: [id number]  [address] [address]

 

 PayID:   [place] [id number]









 Health Maintenance  Expires:  Policy Number:  Medicare [place] ([facility])  10/01/2012  [id number]  [facility]  [place]









 PayID:   [address] [id number]









 MARY Crenshaw [id number]









 Medigap Part B  Effective: 2007  Policy Number:  Medicare  Suellen aPtel



     [id number]    









 PayID: [id number]  [address]









 [place] [address]







Problems







 Date  Description  Provider  Status

 

 Onset:   Paroxysmal atrial fibrillation    Active

 

 Onset: 2011  Impaired fasting glycaemia  Vikki Richardson, N.PCapo  Active

 

 Onset: 2011  Benign essential hypertension  Vikki Richardson, N.PCapo  Active

 

 Onset: 2011  Hyperlipidemia  Vikki Richardson, N.P.  Active

 

 Onset: 2017  Osteopenia  Prachi Hansen M.D.  Active

 

 Onset: 2017  Ascites  Prachi Hansen M.D.  Active

 

 Onset: 2017  Hydronephrosis  Prachi Hansen M.D.  Active









   Note: L side









 Onset: 2017  Calculus of kidney and ureter  Prachi Hansen M.D.  Active









   Note: 1.4 cm









 Onset: 2017  Retroperitoneal mass  Prachi Hansen M.D.  Active

 

 Onset:   B-cell lymphoma (clinical)    Active









   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 Pallawi









 Onset: 2017  Pelvic mass  Prachi Hansen M.D.  Inactive









 Inactive: 2017







Family History







 Date  Family Member(s)  Problem(s)  Comments

 

   General  adopted  

 

   General  No Current Problems  

 

   Mother  Stroke  

 

   Mother   due to Natural Causes  () - at age of 96

 

   Siblings  2  2 half sisters she has not met.



       1 of which has an eating



       disorder







Social History







 Type  Date  Description  Comments

 

 Marital Status      

 

 Lives With    Son  

 

 Occupation      microbiologist

 

 Cigarette Use    Never Smoked Cigarettes  

 

 ETOH Use    Occasionally consumes  



     alcohol  

 

 Smoking    Patient has never smoked  

 

 Recreational Drug Use    Denies Drug Use  Has a prescription for



       medicinal marijuana-but does



       not use currently 17

 

 Daily Caffeine    Comsumes on average 1 cup  



     of decaff coffee per day  

 

 Exercise Type/Frequency    Exercises regularly  walk, 3000 steps a day. pt



       working toward 5000 steps



       daily

 

 General Hx Text      Lives in [place], no time in



       the yard







Allergies, Adverse Reactions, Alerts







 Date  Description  Reaction  Status  Severity  Comments

 

 2010  Sulfa  INTOLERANCE-GI UPSET  active  Moderate  

 

 2017  Rituximab    active    

 

 2018  Amoxicillin  could not swallow right  active    

 

 2018  Latex    active    itchy skin







Medications







 Medication  Date  Status  Form  Strength  Qnty  SIG  Indications  Ordering



                 Provider

 

 Shingrix    Active  Suspension  50mcg  1unit  intramuscular        Rec    s  x 1 then    madan Hansen in 4    M.D.



             months Has not    



             received    



             injection #1    



             as of 10/02/18    

 

 Magnesium    Active  Tablets  250mg  90tab  takes    Jorge Alberto Sparks          s  occasionaly---    F.



             -1 by mouth    Mauser,



             every other    M.D.



             day    

 

 Digoxin    Active  Tablets  125mcg  120ta  2 tabs by    Jorge Alberto



           bs  mouth every    F.



             m,w,f and 1    Mauser,



             tab every    M.D.



             other day of    



             the week    

 

 Multi For Her    Active  Tablets      takes    Prachi



             occasionally    MACHELLE Hansen

 

 Medical    Active        CBD 2 puffs    Unknown



 Marijuana  /          every morning.    



             THC 2 puffs    



             [place] in    



             [place]    

 

                 

 

 Magnesium    Hx  Tablets  250mg  90tab  1 by mouth    Jorge Alberto



 Oxide          s  every day    F.



   -              Brennon,



                 M.D.



                 

 

 Magnesium    Hx  Tablets  250mg  90tab  1 by mouth    Jorge Alberto



 Oxide -MG          s  every day    F.



 Supplement  -              Brennon,



                 M.D.



                 

 

 Keflex    Hx  Capsules  500mg  10cap  1 by mouth    Prachi



           s  twice a day x    Jade,



   -          5 days    M.D.



                 

 

 Magnesium    Hx  Tablets  250mg  30tab  1 by mouth    Jorge Alberto



 Oxide -MG          s  every day    F.



 Supplement  -              Brennon,



                 M.D.



                 

 

 Magnesium    Hx  Tablets  250mg  30tab  1 by mouth    Jorge Alberto



 Oxide          s  every day    F.



   Eduardo Willett,



                 M.D.



                 

 

 No Active    Hx            Unknown



 Medications  /              



   -              



                 

 

 Vitamin B12    Hx  Tablets ER  1000mcg    once a day                  Jade,



   -              M.D.



                 

 

 Digoxin    Hx  Tablets  125mcg  30tab  1 by mouth    Jorge Alberto



           s  daily    F.



   Eduardo Willett,



                 M.D.



                 

 

 No Active    Hx            Unknown



 Medications  /              



   -              



                 

 

 Cipro    Hx  Tablets  500mg  14tab  1 by mouth  N39.0          s  twice a day    Jade,



   -          for 7 days    M.D.



                 

 

 Cipro    Hx  Tablets  250mg  14tab  Did Not Take            s      Varn,



   -              N.P.



                 

 

 Cephalexin    Hx  Capsules  500mg  21cap  take one  L03.114  Mani



           s  capsule every    Peg, NP



   -          8 hours for 7    



   09/01          days    



   /2016              

 

 Ventolin HFA    Hx  Aerosol  108(90Bas  1inha  1 to 2          e)  ler  inhalations    Varn,



   -      mcg/Act    every 4 hours    N.P.



             as needed    



   /              

 

 Zostavax    Hx  Solution  47669Erc/  1unit  1 dose s/c    Ailyn



   /    Rec  0.65ML  s      dEuardo Knight M.D.,



                 FAC              

 

 Crutches    Hx      1Pair  Use as needed  729.4            for plantar    Antonia



   -          fasciitis    JACK.DCapo,



                 FACP



                 

 

 Simvastatin    Hx  Tablets  20mg  90tab  1 by mouth at            s  bedtime    Eduarod Knight M.D.,



                 FACP



                 

 

 Physical    Hx    Back Pain        Ailyn



 Therapy  /2010              Eduardo Knight M.D.,



                 FACP



                 

 

 Atenolol  00  Hx  Tablets  25mg  90tab  take 1 tablet    Prachi



   /0000        s  by mouth once    Jade,



   -          daily    M.D.



                 

 

 Doxycycline    Hx  Capsules  100mg  22cap  Take 1 capsule    Amos E.



 Hyclate  /0000        s  by mouth twice    Benita,



   -          daily    M.D.



                 

 

 Tramadol HCL  0000  Hx  Tablets  50mg        Unknown



   /0000              



   -              



                 

 

 Ondansetron  00  Hx  Tablets  4mg    dissolve one    Unknown



   /0000    Dispers      tablet orally    



   -          every 12 hours    



             as needed for    



   /2018          nausea.    

 

 R-Chop Chemo    Hx        Done    Unknown



 Medications  /0000              



   -              



   03/15              



   /2018              

 

 Potassium  00/00  Hx  Tablets ER  20Meq  90tab  takes    Jorge Alberto



 Chloride ER  /0000        s  occasionally--    F.



   -          -1 by mouth    Brennon



             every day ( Pt    M.D.



             stop taking    



             last week of    



             3/0518)    







Immunizations







 CPT Code  Status  Date  Vaccine  Lot #

 

 [id number]  Given  2018  Pneumococcal Conjugate Vaccine 13 Valent For  [id number]



       Intramuscular Use  

 

 89853  Given  2018  Influenza Virus Vaccine, Quadrivalent, Split,  



       Preservative Free  

 

 76140  Given  2016  Influenza Virus Vaccine, Quadrivalent, Split,  
[id number]



       Preservative Free  

 

   Given  2014  Fluvirin Im 3Yrs And Older  

 

 [id number]  Given  2014  Tdap - Tetanus/Diptheria/Acellular Pertussis  

 

   Given  08/15/2013  Afluria Vaccine  

 

 [id number]  Given  08/15/2013  Zoster (Zostavax)  

 

   Given  2012  Fluzone Vaccine  [id number]

 

 74220  Given  2010  Influenza Virus 3Yrs & Over  

 

 94312  Given  2010  Influenza Virus Vaccine, Pandemic Formulation  

 

 17573  Given  2010  Influenza Virus 3Yrs & Over  

 

 60316  Given  2010  Administration Swine Flu Shot  

 

 49024  Given  2006  Influenza Virus 3Yrs & Over  







Vital Signs







 Date  Vital  Result  Comment

 

 10/02/2018  Height  65.1 inches  5'5.10"









 Weight  157.50 lb  

 

 Heart Rate  80 /min  

 

 BP Systolic  126 mmHg  left arm

 

 BP Diastolic  80 mmHg  left arm

 

 BMI (Body Mass Index)  26.1 kg/m2  

 

 Ejection Fraction  55-60%  10/30/2017 Echocardiogram









 2018  Height  65.1 inches  5'5.10"









 Weight  157.00 lb  w/o shoes

 

 Heart Rate  90 /min  

 

 BP Systolic Sitting  111 mmHg  

 

 BP Diastolic Sitting  61 mmHg  

 

 BMI (Body Mass Index)  26.0 kg/m2  









 2018  Height  65.1 inches  5'5.10"









 Weight  155.00 lb  

 

 Heart Rate  86 /min  

 

 BP Systolic Sitting  110 mmHg  

 

 BP Diastolic Sitting  70 mmHg  

 

 O2 % BldC Oximetry  97 %  

 

 BMI (Body Mass Index)  25.7 kg/m2  









 2018  Height  65 inches  5'5"









 Weight  155.00 lb  

 

 Heart Rate  72 /min  

 

 BP Systolic Sitting  110 mmHg  

 

 BP Diastolic Sitting  78 mmHg  

 

 O2 % BldC Oximetry  97 %  

 

 BMI (Body Mass Index)  25.8 kg/m2  









 03/15/2018  Weight  144.00 lb  with shoes









 Heart Rate  100 /min  

 

 BP Systolic Sitting  128 mmHg  Lue reg cuff

 

 BP Diastolic Sitting  78 mmHg  Lue reg cuff

 

 BP Systolic Standing  130 mmHg  Lue reg cuff

 

 BP Diastolic Standing  80 mmHg  Lue reg cuff

 

 Respiratory Rate  18 /min  

 

 Ejection Fraction  55-60%  date 10/30/17 ECHO









 03/15/2018  Weight  144.00 lb  









 Heart Rate  95 /min  

 

 BP Systolic Sitting  120 mmHg  

 

 BP Diastolic Sitting  72 mmHg  

 

 O2 % BldC Oximetry  96 %  









 2018  Weight  144.12 lb  









 Heart Rate  98 /min  

 

 BP Systolic Sitting  110 mmHg  

 

 BP Diastolic Sitting  60 mmHg  

 

 Body Temperature  97.8 F  

 

 O2 % BldC Oximetry  95 %  









 2017  Height  65 inches  5'5"









 Weight  135.00 lb  with shoes

 

 Heart Rate  106 /min  

 

 BP Systolic Sitting  126 mmHg  LA small cuff

 

 BP Diastolic Sitting  72 mmHg  LA small cuff

 

 BP Systolic Standing  101 mmHg  la repeat sitting

 

 BP Diastolic Standing  64 mmHg  la repeat sitting

 

 BMI (Body Mass Index)  22.5 kg/m2  

 

 Ejection Fraction  55% - 60%  echo 10/30/17









 2017  Height  65 inches  5'5"









 Weight  167.00 lb  

 

 Heart Rate  100 /min  

 

 BP Systolic Sitting  130 mmHg  

 

 BP Diastolic Sitting  76 mmHg  

 

 Body Temperature  98.4 F  

 

 O2 % BldC Oximetry  95 %  

 

 BMI (Body Mass Index)  27.8 kg/m2  









 2017  Height  65 inches  5'5"









 Weight  168.00 lb  

 

 Heart Rate  97 /min  

 

 BP Systolic  120 mmHg  

 

 BP Diastolic  80 mmHg  

 

 Body Temperature  98.2 F  

 

 O2 % BldC Oximetry  98 %  

 

 BMI (Body Mass Index)  28.0 kg/m2  









 2017  Height  65 inches  5'5"









 Weight  180.38 lb  

 

 Heart Rate  79 /min  

 

 BP Systolic  120 mmHg  

 

 BP Diastolic  76 mmHg  

 

 Body Temperature  97.2 F  

 

 O2 % BldC Oximetry  96 %  

 

 BMI (Body Mass Index)  30.0 kg/m2  









 2017  Weight  184.12 lb  









 Heart Rate  90 /min  

 

 BP Systolic  120 mmHg  

 

 BP Diastolic  80 mmHg  

 

 Body Temperature  98.2 F  

 

 O2 % BldC Oximetry  96 %  









 2016  Weight  183.00 lb  









 Heart Rate  88 /min  

 

 BP Systolic Sitting  122 mmHg  

 

 BP Diastolic Sitting  64 mmHg  

 

 Respiratory Rate  15 /min  

 

 Body Temperature  97.4 F  

 

 O2 % BldC Oximetry  98 %  









 2016  Weight  184.00 lb  









 Heart Rate  80 /min  

 

 BP Systolic Sitting  112 mmHg  

 

 BP Diastolic Sitting  64 mmHg  

 

 Body Temperature  98.4 F  

 

 O2 % BldC Oximetry  97 %  









 2016  Height  65.5 inches  5'5.50"









 Weight  184.25 lb  

 

 Heart Rate  64 /min  

 

 BP Systolic Sitting  120 mmHg  

 

 BP Diastolic Sitting  82 mmHg  

 

 Respiratory Rate  14 /min  

 

 Body Temperature  98.0 F  

 

 BMI (Body Mass Index)  30.2 kg/m2  









 2016  Height  65.5 inches  5'5.50"









 Weight  184.12 lb  

 

 Heart Rate  72 /min  

 

 BP Systolic Sitting  132 mmHg  

 

 BP Diastolic Sitting  82 mmHg  

 

 Body Temperature  97.3 F  

 

 O2 % BldC Oximetry  98 %  

 

 BMI (Body Mass Index)  30.2 kg/m2  









 2016  Weight  184.50 lb  









 Heart Rate  92 /min  

 

 BP Systolic Sitting  126 mmHg  

 

 BP Diastolic Sitting  70 mmHg  

 

 Body Temperature  99.0 F  

 

 O2 % BldC Oximetry  96 %  









 2016  Weight  192.00 lb  









 Heart Rate  84 /min  

 

 BP Systolic Sitting  144 mmHg  

 

 BP Diastolic Sitting  84 mmHg  

 

 Respiratory Rate  15 /min  

 

 Body Temperature  100.2 F  

 

 O2 % BldC Oximetry  98 %  









 2015  Height  65.5 inches  5'5.50"









 Weight  192.00 lb  

 

 Heart Rate  66 /min  

 

 BP Systolic Sitting  128 mmHg  

 

 BP Diastolic Sitting  80 mmHg  

 

 Body Temperature  98.7 F  

 

 O2 % BldC Oximetry  97 %  

 

 BMI (Body Mass Index)  31.5 kg/m2  









 2012  Height  65.5 inches  5'5.50"









 Weight  190.00 lb  

 

 Heart Rate  60 /min  

 

 BP Systolic Sitting  122 mmHg  

 

 BP Diastolic Sitting  76 mmHg  

 

 BMI (Body Mass Index)  31.1 kg/m2  









 2011  Height  65.5 inches  5'5.50"









 Weight  187.00 lb  

 

 Heart Rate  58 /min  

 

 BP Systolic Sitting  132 mmHg  L

 

 BP Diastolic Sitting  80 mmHg  L

 

 BMI (Body Mass Index)  30.6 kg/m2  









 2010  Weight  179.00 lb  









 Heart Rate  62 /min  

 

 BP Systolic  108 mmHg  

 

 BP Diastolic  68 mmHg  









 2010  Weight  178.25 lb  









 Heart Rate  72 /min  

 

 BP Systolic  116 mmHg  

 

 BP Diastolic  70 mmHg  







Results







 Test  Date  Test  Result  H/L  Range  Note

 

 Laboratory test finding  2018  TSH (Thyroid Stim  1.44 mcIU/mL    0.34-
5.60  



     Horm)        









 T3 Free  3.20 pg/mL    2.5-3.9  

 

 Free T4 (Free Thyroxine)  0.85 ng/dL    0.61-1.12  









 Laboratory test finding  2018  Glucose  113 mg/dL  High    









 Hemoglobin A1c (Glyco HGB)  5.8 %  High  4.0-5.6  1









 Laboratory test finding  2018  Magnesium  1.9 mg/dL    1.9-2.7  

 

 Laboratory test finding  2018  Cytology Non-Gyn  SEE RESULT BELOW      2

 

 Laboratory test finding  2018  Hemosure Medicare  Negative      

 

 CBC Auto Diff  03/15/2018  White Blood Count  3.8 10^3/uL    3.5-10.8  









 Red Blood Count  4.17 10^6/uL    4.0-5.4  

 

 Hemoglobin  12.0 g/dL    12.0-16.0  

 

 Hematocrit  36 %    35-47  

 

 Mean Corpuscular Volume  87 fL    80-97  

 

 Mean Corpuscular Hemoglobin  29 pg    27-31  

 

 Mean Corpuscular HGB Conc  33 g/dL    31-36  

 

 Red Cell Distribution Width  13 %    10.5-15  

 

 Platelet Count  115 10^3/uL  Low  150-450  

 

 Mean Platelet Volume  8 um3    7.4-10.4  

 

 Abs Neutrophils  2.8 10^3/uL    1.5-7.7  

 

 Abs Lymphocytes  0.5 10^3/uL  Low  1.0-4.8  

 

 Abs Monocytes  0.4 10^3/uL    0-0.8  

 

 Abs Eosinophils  0 10^3/uL    0-0.6  

 

 Abs Basophils  0 10^3/uL    0-0.2  

 

 Abs Nucleated RBC  0 10^3/uL      

 

 Granulocyte %  74.1 %    38-83  

 

 Lymphocyte %  12.9 %  Low  25-47  

 

 Monocyte %  11.6 %  High  0-7  

 

 Eosinophil %  0.8 %    0-6  

 

 Basophil %  0.6 %    0-2  

 

 Nucleated Red Blood Cells %  0.1      









 Laboratory test finding  2018  Magnesium  1.7 mg/dL  Low  1.9-2.7  









 Digoxin  0.5 ng/ml  Low  0.8-2.0  









 Comp Metabolic Panel  2018  Sodium  139 mmol/L    133-145  









 Potassium  3.9 mmol/L    3.5-5.0  

 

 Chloride  106 mmol/L    101-111  

 

 Co2 Carbon Dioxide  28 mmol/L    22-32  

 

 Anion Gap  5 mmol/L    2-11  

 

 Glucose  108 mg/dL  High    

 

 Blood Urea Nitrogen  25 mg/dL  High  6-24  

 

 Creatinine  0.72 mg/dL    0.51-0.95  

 

 BUN/Creatinine Ratio  34.7  High  8-20  

 

 Calcium  9.6 mg/dL    8.6-10.3  

 

 Total Protein  6.3 g/dL  Low  6.4-8.9  

 

 Albumin  4.2 g/dL    3.2-5.2  

 

 Globulin  2.1 g/dL    2-4  

 

 Albumin/Globulin Ratio  2.0    1-3  

 

 Total Bilirubin  0.50 mg/dL    0.2-1.0  

 

 Alkaline Phosphatase  55 U/L      

 

 Alt  18 U/L    7-52  

 

 Ast  22 U/L    13-39  

 

 Egfr Non-  79.0    >60  

 

 Egfr   101.6    >60  3









 CBC Auto Diff  2018  White Blood Count  4.2 10^3/uL    3.5-10.8  









 Red Blood Count  3.58 10^6/uL  Low  4.0-5.4  

 

 Hemoglobin  11.1 g/dL  Low  12.0-16.0  

 

 Hematocrit  33 %  Low  35-47  

 

 Mean Corpuscular Volume  93 fL    80-97  

 

 Mean Corpuscular Hemoglobin  31 pg    27-31  

 

 Mean Corpuscular HGB Conc  33 g/dL    31-36  

 

 Red Cell Distribution Width  15 %    10.5-15  

 

 Platelet Count  115 10^3/uL  Low  150-450  

 

 Mean Platelet Volume  8 um3    7.4-10.4  

 

 Abs Neutrophils  3.2 10^3/uL    1.5-7.7  

 

 Abs Lymphocytes  0.5 10^3/uL  Low  1.0-4.8  

 

 Abs Monocytes  0.5 10^3/uL    0-0.8  

 

 Abs Eosinophils  0.1 10^3/uL    0-0.6  

 

 Abs Basophils  0 10^3/uL    0-0.2  

 

 Abs Nucleated RBC  0 10^3/uL      

 

 Granulocyte %  76.0 %    38-83  

 

 Lymphocyte %  10.9 %  Low  25-47  

 

 Monocyte %  11.4 %  High  1-9  

 

 Eosinophil %  1.2 %    0-6  

 

 Basophil %  0.5 %    0-2  

 

 Nucleated Red Blood Cells %  0.1      









 Laboratory test finding  2018  Uric Acid  5.6 mg/dL    2.3-6.6  









 LDH  121 U/L  Low  140-271  









 Comp Metabolic Panel  2018  Sodium  138 mmol/L    133-145  









 Potassium  3.8 mmol/L    3.5-5.0  

 

 Chloride  104 mmol/L    101-111  

 

 Co2 Carbon Dioxide  29 mmol/L    22-32  

 

 Anion Gap  5 mmol/L    2-11  

 

 Glucose  116 mg/dL  High    

 

 Blood Urea Nitrogen  22 mg/dL    6-24  

 

 Creatinine  0.81 mg/dL    0.51-0.95  

 

 BUN/Creatinine Ratio  27.2  High  8-20  

 

 Calcium  9.2 mg/dL    8.6-10.3  

 

 Total Protein  5.6 g/dL  Low  6.4-8.9  

 

 Albumin  3.8 g/dL    3.2-5.2  

 

 Globulin  1.8 g/dL  Low  2-4  

 

 Albumin/Globulin Ratio  2.1    1-3  

 

 Total Bilirubin  0.40 mg/dL    0.2-1.0  

 

 Alkaline Phosphatase  49 U/L      

 

 Alt  13 U/L    7-52  

 

 Ast  19 U/L    13-39  

 

 Egfr Non-  68.9    >60  

 

 Egfr   88.6    >60  4









 Urinalysis Profile  2017  Urine Color  Yellow      









 Urine Appearance  Clear      

 

 Urine Specific Gravity  1.003  Low  1.010-1.030  

 

 Urine pH  7.0    5-9  

 

 Urine Urobilinogen  Negative    Negative  

 

 Urine Ketones  Negative    Negative  

 

 Urine Protein  Negative    Negative  

 

 Urine Leukocytes  Negative    Negative  

 

 Urine Blood  1+    Negative  

 

 Urine Nitrite  Negative    Negative  

 

 Urine Bilirubin  Negative    Negative  

 

 Urine Glucose  Negative    Negative  

 

 Urine White Blood Cell  Absent    Absent  

 

 Urine Red Blood Cell  Trace(0-2/hpf)    Absent  

 

 Urine Bacteria  Absent    Absent  









 Laboratory test finding  2017  Digoxin  0.7 ng/ml  Low  0.8-2.0  









 Magnesium  1.7 mg/dL  Low  1.9-2.7  

 

 Pathologist Review  (SEE NOTE)      5

 

 Blood Culture  SEE RESULT BELOW      6









 Manual Differential  2017  Neutrophil %  17 %  Low  38-83  









 Lymphocytes %  62 %  High  25-47  

 

 Monocytes %  10 %    0-13  

 

 Eosinophils %  1 %    0-6  

 

 Basophil %  1 %    0-2  

 

 Reactive Lymph %  9 %  High  0-6  

 

 Hypochromasia  1+      

 

 Comments  (SEE NOTE)      7









 CBC Auto Diff  2017  White Blood Count  0.3 10^3/uL  Low  3.5-10.8  









 Red Blood Count  2.84 10^6/uL  Low  4.0-5.4  

 

 Hemoglobin  8.9 g/dL  Low  12.0-16.0  

 

 Hematocrit  26 %  Low  35-47  

 

 Mean Corpuscular Volume  92 fL    80-97  

 

 Mean Corpuscular Hemoglobin  31 pg    27-31  

 

 Mean Corpuscular HGB Conc  34 g/dL    31-36  

 

 Red Cell Distribution Width  14 %    10.5-15  

 

 Platelet Count  29 10^3/uL  Low  150-450  

 

 Mean Platelet Volume  8 um3    7.4-10.4  

 

 Abs Neutrophils  0.1 10^3/uL  Low  1.5-7.7  8

 

 Abs Lymphocytes  0.2 10^3/uL  Low  1.0-4.8  

 

 Abs Monocytes  0 10^3/uL    0-0.8  

 

 Abs Eosinophils  0 10^3/uL    0-0.6  

 

 Abs Basophils  0 10^3/uL    0-0.2  









 Laboratory test finding  2017  Troponin-I (TnI)  0.07 ng/mL  High  <0.04
  9

 

 Comp Metabolic Panel  2017  Sodium  135 mmol/L    133-145  









 Potassium  3.7 mmol/L    3.5-5.0  

 

 Chloride  102 mmol/L    101-111  

 

 Co2 Carbon Dioxide  28 mmol/L    22-32  

 

 Anion Gap  5 mmol/L    2-11  

 

 Glucose  88 mg/dL      

 

 Blood Urea Nitrogen  21 mg/dL    6-24  

 

 Creatinine  0.83 mg/dL    0.51-0.95  

 

 BUN/Creatinine Ratio  25.3  High  8-20  

 

 Calcium  8.8 mg/dL    8.6-10.3  

 

 Total Protein  5.5 g/dL  Low  6.4-8.9  

 

 Albumin  3.5 g/dL    3.2-5.2  

 

 Globulin  2.0 g/dL    2-4  

 

 Albumin/Globulin Ratio  1.8    1-3  

 

 Total Bilirubin  0.60 mg/dL    0.2-1.0  

 

 Alkaline Phosphatase  57 U/L      

 

 Alt  10 U/L    7-52  

 

 Ast  11 U/L  Low  13-39  

 

 Egfr Non-  67.0    >60  

 

 Egfr   86.2    >60  10









 Laboratory test finding  2017  Partial Thrombo Time  33.1 seconds    26.0
-36.3  



     PTT        

 

 Inr/Protime  2017  Inr  0.99    0.77-1.02  11

 

 Laboratory test finding  2017  Lactic Acid  1.0 mmol/L    0.5-2.0  12

 

 Laboratory test finding  2017  Lactic Acid  1.5 mmol/L    0.5-2.0  13

 

 Basic Metabolic Panel  2017  Sodium  135 mmol/L    133-145  









 Potassium  3.9 mmol/L    3.5-5.0  

 

 Chloride  100 mmol/L  Low  101-111  

 

 Co2 Carbon Dioxide  32 mmol/L    22-32  

 

 Anion Gap  3 mmol/L    2-11  

 

 Glucose  85 mg/dL      

 

 Blood Urea Nitrogen  22 mg/dL    6-24  

 

 Creatinine  0.61 mg/dL    0.51-0.95  

 

 BUN/Creatinine Ratio  36.1  High  8-20  

 

 Calcium  9.0 mg/dL    8.6-10.3  

 

 Egfr Non-  95.6    >60  

 

 Egfr   123.0    >60  14









 Laboratory test finding  2017  Uric Acid  5.1 mg/dL    2.3-6.6  









 LDH  132 U/L  Low  140-271  









 Comp Metabolic Panel  2017  Sodium  135 mmol/L    133-145  









 Potassium  4.0 mmol/L    3.5-5.0  

 

 Chloride  99 mmol/L  Low  101-111  

 

 Co2 Carbon Dioxide  32 mmol/L    22-32  

 

 Anion Gap  4 mmol/L    2-11  

 

 Glucose  86 mg/dL      

 

 Blood Urea Nitrogen  22 mg/dL    6-24  

 

 Creatinine  0.61 mg/dL    0.51-0.95  

 

 BUN/Creatinine Ratio  36.1  High  8-20  

 

 Calcium  9.0 mg/dL    8.6-10.3  

 

 Total Protein  5.4 g/dL  Low  6.4-8.9  

 

 Albumin  3.6 g/dL    3.2-5.2  

 

 Globulin  1.8 g/dL  Low  2-4  

 

 Albumin/Globulin Ratio  2.0    1-3  

 

 Total Bilirubin  0.60 mg/dL    0.2-1.0  

 

 Alkaline Phosphatase  65 U/L      

 

 Alt  10 U/L    7-52  

 

 Ast  15 U/L    13-39  

 

 Egfr Non-  95.6    >60  

 

 Egfr   123.0    >60  15









 Laboratory test finding  2017  Magnesium  1.7 mg/dL  Low  1.9-2.7  

 

 Laboratory test finding  2017  Pathologist Review  (SEE NOTE)      16

 

 Manual Differential  2017  Immature Granulocytes  2 %    0-9  









 Neutrophil %  98 %  High  38-83  

 

 Band %  2 %    0-8  

 

 Hypochromasia  1+      









 CBC Auto Diff  2017  White Blood Count  18.2 10^3/uL  High  3.5-10.8  









 Red Blood Count  2.94 10^6/uL  Low  4.0-5.4  

 

 Hemoglobin  9.2 g/dL  Low  12.0-16.0  

 

 Hematocrit  27 %  Low  35-47  

 

 Mean Corpuscular Volume  93 fL    80-97  

 

 Mean Corpuscular Hemoglobin  31 pg    27-31  

 

 Mean Corpuscular HGB Conc  33 g/dL    31-36  

 

 Red Cell Distribution Width  16 %  High  10.5-15  

 

 Platelet Count  53 10^3/uL  Low  150-450  

 

 Mean Platelet Volume  9 um3    7.4-10.4  

 

 Abs Neutrophils  18.2 10^3/uL  High  1.5-7.7  

 

 Abs Lymphocytes  0 10^3/uL  Low  1.0-4.8  

 

 Abs Monocytes  0 10^3/uL    0-0.8  

 

 Abs Eosinophils  0 10^3/uL    0-0.6  

 

 Abs Basophils  0 10^3/uL    0-0.2  









 Laboratory test finding  2017  Magnesium  1.9 mg/dL    1.9-2.7  









 Creatine Kinase(CK)  12 U/L      

 

 Troponin-I (TnI)  0.07 ng/mL  High  <0.04  17

 

 Digoxin  1.6 ng/ml    0.8-2.0  

 

 TSH (Thyroid Stim Horm)  0.26 mcIU/mL  Low  0.34-5.60  

 

 T3 Free  2.50 pg/mL    2.5-3.9  

 

 Free T4 (Free Thyroxine)  0.94 ng/dL    0.61-1.12  









 Comp Metabolic Panel  2017  Sodium  139 mmol/L    133-145  









 Potassium  3.4 mmol/L  Low  3.5-5.0  

 

 Chloride  105 mmol/L    101-111  

 

 Co2 Carbon Dioxide  27 mmol/L    22-32  

 

 Anion Gap  7 mmol/L    2-11  

 

 Glucose  111 mg/dL  High    

 

 Blood Urea Nitrogen  24 mg/dL    6-24  

 

 Creatinine  0.65 mg/dL    0.51-0.95  

 

 BUN/Creatinine Ratio  36.9  High  8-20  

 

 Calcium  9.5 mg/dL    8.6-10.3  

 

 Total Protein  5.7 g/dL  Low  6.4-8.9  

 

 Albumin  3.8 g/dL    3.2-5.2  

 

 Globulin  1.9 g/dL  Low  2-4  

 

 Albumin/Globulin Ratio  2.0    1-3  

 

 Total Bilirubin  0.60 mg/dL    0.2-1.0  

 

 Alkaline Phosphatase  93 U/L      

 

 Alt  10 U/L    7-52  

 

 Ast  17 U/L    13-39  

 

 Egfr Non-  88.9    >60  

 

 Egfr   114.3    >60  18









 Laboratory test finding  2017  Partial Thrombo Time  30.9 seconds    26.0
-36.3  



     PTT        









 Lactic Acid  1.6 mmol/L    0.5-2.0  19

 

 B-Type Natriuretic Peptide BNP  524 pg/mL  High    20









 Laboratory test finding  2017  Troponin-I (TnI)  0.07 ng/mL  High  <0.04
  21

 

 Laboratory test finding  2017  Digoxin  0.5 ng/ml  Low  0.8-2.0  22, 23









 Magnesium  1.7 mg/dL  Low  1.9-2.7  22, 24









 Laboratory test finding  2017  Uric Acid  5.7 mg/dL    2.3-6.6  









 LDH  103 U/L  Low  140-271  

 

 Digoxin  0.4 ng/ml  Low  0.8-2.0  









 Comp Metabolic Panel  2017  Sodium  140 mmol/L    133-145  









 Potassium  3.9 mmol/L    3.5-5.0  

 

 Chloride  103 mmol/L    101-111  

 

 Co2 Carbon Dioxide  33 mmol/L  High  22-32  

 

 Anion Gap  4 mmol/L    2-11  

 

 Glucose  103 mg/dL  High    

 

 Blood Urea Nitrogen  16 mg/dL    6-24  

 

 Creatinine  0.71 mg/dL    0.51-0.95  

 

 BUN/Creatinine Ratio  22.5  High  8-20  

 

 Calcium  9.3 mg/dL    8.6-10.3  

 

 Total Protein  5.7 g/dL  Low  6.4-8.9  

 

 Albumin  3.8 g/dL    3.2-5.2  

 

 Globulin  1.9 g/dL  Low  2-4  

 

 Albumin/Globulin Ratio  2.0    1-3  

 

 Total Bilirubin  0.30 mg/dL    0.2-1.0  

 

 Alkaline Phosphatase  64 U/L      

 

 Alt  9 U/L    7-52  

 

 Ast  14 U/L    13-39  

 

 Egfr Non-  80.3    >60  

 

 Egfr   103.2    >60  25









 CBC Auto Diff  2017  White Blood Count  5.5 10^3/uL    3.5-10.8  









 Red Blood Count  3.13 10^6/uL  Low  4.0-5.4  

 

 Hemoglobin  9.7 g/dL  Low  12.0-16.0  

 

 Hematocrit  29 %  Low  35-47  

 

 Mean Corpuscular Volume  93 fL    80-97  

 

 Mean Corpuscular Hemoglobin  31 pg    27-31  

 

 Mean Corpuscular HGB Conc  34 g/dL    31-36  

 

 Red Cell Distribution Width  16 %  High  10.5-15  

 

 Platelet Count  88 10^3/uL  Low  150-450  26

 

 Mean Platelet Volume  8 um3    7.4-10.4  

 

 Abs Neutrophils  4.5 10^3/uL    1.5-7.7  

 

 Abs Lymphocytes  0.5 10^3/uL  Low  1.0-4.8  

 

 Abs Monocytes  0.5 10^3/uL    0-0.8  

 

 Abs Eosinophils  0 10^3/uL    0-0.6  

 

 Abs Basophils  0 10^3/uL    0-0.2  

 

 Abs Nucleated RBC  0 10^3/uL      

 

 Granulocyte %  81.2 %    38-83  

 

 Lymphocyte %  8.8 %  Low  25-47  

 

 Monocyte %  9.6 %  High  1-9  

 

 Eosinophil %  0.1 %    0-6  

 

 Basophil %  0.3 %    0-2  

 

 Nucleated Red Blood Cells %  0.1      









 Comp Metabolic Panel  10/29/2017  Sodium  141 mmol/L    133-145  









 Potassium  3.0 mmol/L  Low  3.5-5.0  

 

 Chloride  107 mmol/L    101-111  

 

 Co2 Carbon Dioxide  26 mmol/L    22-32  

 

 Anion Gap  8 mmol/L    2-11  

 

 Glucose  112 mg/dL  High    

 

 Blood Urea Nitrogen  38 mg/dL  High  6-24  

 

 Creatinine  0.76 mg/dL    0.51-0.95  

 

 BUN/Creatinine Ratio  50.0  High  8-20  

 

 Calcium  9.4 mg/dL    8.6-10.3  

 

 Total Protein  5.7 g/dL  Low  6.4-8.9  

 

 Albumin  3.8 g/dL    3.2-5.2  

 

 Globulin  1.9 g/dL  Low  2-4  

 

 Albumin/Globulin Ratio  2.0    1-3  

 

 Total Bilirubin  0.70 mg/dL    0.2-1.0  

 

 Alkaline Phosphatase  76 U/L      

 

 Alt  10 U/L    7-52  

 

 Ast  16 U/L    13-39  

 

 Egfr Non-  74.2    >60  

 

 Egfr   95.4    >60  27









 Laboratory test finding  10/29/2017  Magnesium  1.8 mg/dL  Low  1.9-2.7  









 Creatine Kinase(CK)  < 10 U/L  Low    

 

 Troponin-I (TnI)  0.03 ng/mL    <0.04  









 CKMB  10/29/2017  CKMB  ng/mL  2.2 ng/mL    0.6-6.3  

 

 Laboratory test  10/29/2017  TSH (Thyroid Stim  0.43 mcIU/mL    0.34-5.60  



 finding    Horm)        

 

 Laboratory test  10/29/2017  B-Type Natriuretic  581 pg/mL  High    28



 finding    Peptide BNP        

 

 Laboratory test  10/29/2017  Lactic Acid  2.2 mmol/L  High  0.5-2.0  29



 finding            









 Blood Culture  SEE RESULT BELOW      30









 CBC Auto Diff  10/29/2017  White Blood Count  5.7 10^3/uL    3.5-10.8  









 Red Blood Count  3.10 10^6/uL  Low  4.0-5.4  

 

 Hemoglobin  9.1 g/dL  Low  12.0-16.0  

 

 Hematocrit  27 %  Low  35-47  

 

 Mean Corpuscular Volume  88 fL    80-97  

 

 Mean Corpuscular Hemoglobin  29 pg    27-31  

 

 Mean Corpuscular HGB Conc  33 g/dL    31-36  

 

 Red Cell Distribution Width  22 %  High  10.5-15  

 

 Platelet Count  46 10^3/uL  Low  150-450  

 

 Mean Platelet Volume  9 um3    7.4-10.4  

 

 Abs Nucleated RBC  0 10^3/uL      

 

 Abs Neutrophils  5.3 10^3/uL    1.5-7.7  

 

 Abs Lymphocytes  0.3 10^3/uL  Low  1.0-4.8  

 

 Abs Monocytes  0.1 10^3/uL    0-0.8  

 

 Abs Eosinophils  0 10^3/uL    0-0.6  

 

 Abs Basophils  0 10^3/uL    0-0.2  









 Manual Differential  10/29/2017  Immature Granulocytes  2 %    0-9  









 Neutrophil %  92 %  High  38-83  

 

 Band %  2 %    0-8  

 

 Lymphocytes %  5 %  Low  25-47  

 

 Monocytes %  1 %    0-13  

 

 Toxic Granulation  1+      

 

 Hypochromasia  1+      

 

 RBC Inclusions  Dohle Bodies      









 Laboratory test finding  10/29/2017  Pathologist Review  (SEE NOTE)      31

 

 Laboratory test finding  10/09/2017  LDH  176 U/L    140-271  

 

 Comp Metabolic Panel  10/09/2017  Sodium  138 mmol/L    133-145  









 Potassium  3.5 mmol/L    3.5-5.0  

 

 Chloride  104 mmol/L    101-111  

 

 Co2 Carbon Dioxide  29 mmol/L    22-32  

 

 Anion Gap  5 mmol/L    2-11  

 

 Glucose  114 mg/dL  High    

 

 Blood Urea Nitrogen  25 mg/dL  High  6-24  

 

 Creatinine  0.62 mg/dL    0.51-0.95  

 

 BUN/Creatinine Ratio  40.3  High  8-20  

 

 Calcium  8.7 mg/dL    8.6-10.3  

 

 Total Protein  5.3 g/dL  Low  6.4-8.9  

 

 Albumin  3.6 g/dL    3.2-5.2  

 

 Globulin  1.7 g/dL  Low  2-4  

 

 Albumin/Globulin Ratio  2.1    1-3  

 

 Total Bilirubin  0.80 mg/dL    0.2-1.0  

 

 Alkaline Phosphatase  64 U/L      

 

 Alt  10 U/L    7-52  

 

 Ast  14 U/L    13-39  

 

 Egfr Non-  93.8    >60  

 

 Egfr   120.7    >60  32









 Laboratory test finding  10/09/2017  Uric Acid  5.0 mg/dL    2.3-6.6  

 

 CBC Auto Diff  10/09/2017  White Blood Count  1.4 10^3/uL  Low  3.5-10.8  









 Red Blood Count  3.64 10^6/uL  Low  4.0-5.4  

 

 Hemoglobin  10.0 g/dL  Low  12.0-16.0  

 

 Hematocrit  30 %  Low  35-47  

 

 Mean Corpuscular Volume  83 fL    80-97  

 

 Mean Corpuscular Hemoglobin  27 pg    27-31  

 

 Mean Corpuscular HGB Conc  33 g/dL    31-36  

 

 Red Cell Distribution Width  21 %  High  10.5-15  

 

 Platelet Count  62 10^3/uL  Low  150-450  

 

 Mean Platelet Volume  9 um3    7.4-10.4  

 

 Abs Neutrophils  1.11 10^3/uL  Low  1.5-7.7  

 

 Abs Lymphocytes  0.21 10^3/uL  Low  1.0-4.8  

 

 Abs Monocytes  0.07 10^3/uL    0-0.8  

 

 Abs Eosinophils  0.01 10^3/uL    0-0.6  

 

 Abs Basophils  0 10^3/uL    0-0.2  

 

 Abs Nucleated RBC  0 10^3/uL      









 Manual Differential  10/09/2017  Immature Granulocytes  2 %    0-9  









 Neutrophil %  77 %    38-83  

 

 Band %  2 %    0-8  

 

 Lymphocytes %  14 %  Low  25-47  

 

 Monocytes %  5 %    0-13  

 

 Eosinophils %  1 %    0-6  

 

 Reactive Lymph %  1 %    0-6  

 

 RBC Morphology  Normal    Normal  









 Laboratory test finding  10/09/2017  Pathologist Review  (SEE NOTE)      33

 

 Urinalysis Profile  2017  Urine Color  Yellow      









 Urine Appearance  Clear      

 

 Urine Specific Gravity  1.014    1.010-1.030  

 

 Urine pH  6.0    5-9  

 

 Urine Urobilinogen  Negative    Negative  

 

 Urine Ketones  2+    Negative  

 

 Urine Protein  Negative    Negative  

 

 Urine Leukocytes  Negative    Negative  

 

 Urine Blood  1+    Negative  

 

 Urine Nitrite  Positive    Negative  

 

 Urine Bilirubin  Negative    Negative  

 

 Urine Glucose  Negative    Negative  

 

 Urine White Blood Cell  Trace(0-5/hpf)    Absent  

 

 Urine Red Blood Cell  Trace(0-2/hpf)    Absent  

 

 Urine Bacteria  Absent    Absent  

 

 Urine Squamous Epithelial Cell  Present    Absent  









 Urine Culture And  2017  Urine Culture  SEE RESULT BELOW      34



 Sensitivities            

 

 Laboratory test finding  2017  Lactic Acid  0.8 mmol/L    0.5-2.0  35

 

 Laboratory test finding  2017  Lipase  < 10 U/L  Low  11.0-82.0  









 Troponin-I (TnI)  0.00 ng/mL    <0.04  









 Comp Metabolic Panel  2017  Sodium  133 mmol/L    133-145  









 Potassium  3.8 mmol/L    3.5-5.0  

 

 Chloride  99 mmol/L  Low  101-111  

 

 Co2 Carbon Dioxide  26 mmol/L    22-32  

 

 Anion Gap  8 mmol/L    2-11  

 

 Glucose  94 mg/dL      

 

 Blood Urea Nitrogen  18 mg/dL    6-24  

 

 Creatinine  0.63 mg/dL    0.51-0.95  

 

 BUN/Creatinine Ratio  28.6  High  8-20  

 

 Calcium  8.9 mg/dL    8.6-10.3  

 

 Total Protein  5.0 g/dL  Low  6.4-8.9  

 

 Albumin  3.1 g/dL  Low  3.2-5.2  

 

 Globulin  1.9 g/dL  Low  2-4  

 

 Albumin/Globulin Ratio  1.6    1-3  

 

 Total Bilirubin  1.20 mg/dL  High  0.2-1.0  

 

 Alkaline Phosphatase  47 U/L      

 

 Alt  9 U/L    7-52  

 

 Ast  10 U/L  Low  13-39  

 

 Egfr Non-  92.1    >60  

 

 Egfr   118.5    >60  36









 Laboratory test finding  2017  Partial Thrombo Time  58.4 seconds  High  
26.0-36.3  



     PTT        

 

 CBC Auto Diff  2017  White Blood Count  0.2 10^3/uL  Low  3.5-10.8  









 Red Blood Count  3.98 10^6/uL  Low  4.0-5.4  

 

 Hemoglobin  10.5 g/dL  Low  12.0-16.0  

 

 Hematocrit  32 %  Low  35-47  

 

 Mean Corpuscular Volume  79 fL  Low  80-97  

 

 Mean Corpuscular Hemoglobin  26 pg  Low  27-31  

 

 Mean Corpuscular HGB Conc  33 g/dL    31-36  

 

 Red Cell Distribution Width  14 %    10.5-15  

 

 Platelet Count  52 10^3/uL  Low  150-450  

 

 Mean Platelet Volume  8 um3    7.4-10.4  

 

 Abs Neutrophils  0.1 10^3/uL  Low  1.5-7.7  37

 

 Abs Lymphocytes  0.1 10^3/uL  Low  1.0-4.8  

 

 Abs Monocytes  0.1 10^3/uL    0-0.8  

 

 Abs Eosinophils  0 10^3/uL    0-0.6  

 

 Abs Basophils  0 10^3/uL    0-0.2  

 

 Abs Nucleated RBC  0 10^3/uL      

 

 Granulocyte %  24.5 %  Low  38-83  

 

 Lymphocyte %  31.0 %    25-47  

 

 Monocyte %  34.2 %  High  1-9  

 

 Eosinophil %  9.3 %  High  0-6  

 

 Basophil %  1.0 %    0-2  

 

 Nucleated Red Blood Cells %  0.3      









 Urinalysis Profile  2017  Urine Color  Yellow      









 Urine Appearance  Clear      

 

 Urine Specific Gravity  1.048  High  1.010-1.030  

 

 Urine pH  5.0    5-9  

 

 Urine Urobilinogen  Negative    Negative  

 

 Urine Ketones  Trace    Negative  

 

 Urine Protein  Negative    Negative  

 

 Urine Leukocytes  Negative    Negative  

 

 Urine Blood  Negative    Negative  

 

 Urine Nitrite  Negative    Negative  

 

 Urine Bilirubin  Negative    Negative  

 

 Urine Glucose  Negative    Negative  









 CBC Auto Diff  2017  White Blood Count  6.7 10^3/uL    3.5-10.8  









 Red Blood Count  4.55 10^6/uL    4.0-5.4  

 

 Hemoglobin  12.0 g/dL    12.0-16.0  

 

 Hematocrit  37 %    35-47  

 

 Mean Corpuscular Volume  81 fL    80-97  

 

 Mean Corpuscular Hemoglobin  26 pg  Low  27-31  

 

 Mean Corpuscular HGB Conc  32 g/dL    31-36  

 

 Red Cell Distribution Width  14 %    10.5-15  

 

 Platelet Count  291 10^3/uL    150-450  

 

 Mean Platelet Volume  7 um3  Low  7.4-10.4  

 

 Abs Neutrophils  5.6 10^3/uL    1.5-7.7  

 

 Abs Lymphocytes  0.3 10^3/uL  Low  1.0-4.8  

 

 Abs Monocytes  0.7 10^3/uL    0-0.8  

 

 Abs Eosinophils  0 10^3/uL    0-0.6  

 

 Abs Basophils  0 10^3/uL    0-0.2  

 

 Abs Nucleated RBC  0 10^3/uL      

 

 Granulocyte %  84.1 %  High  38-83  

 

 Lymphocyte %  5.0 %  Low  25-47  

 

 Monocyte %  10.0 %  High  1-9  

 

 Eosinophil %  0.5 %    0-6  

 

 Basophil %  0.4 %    0-2  

 

 Nucleated Red Blood Cells %  0.1      









 Laboratory test finding  2017  Lactic Acid  0.8 mmol/L    0.5-2.0  38

 

 Inr/Protime  2017  Inr  0.95    0.89-1.11  

 

 Laboratory test finding  2017  Partial Thrombo Time  31.7 seconds    26.0
-36.3  



     PTT        

 

 Comp Metabolic Panel  2017  Sodium  137 mmol/L    133-145  









 Potassium  4.0 mmol/L    3.5-5.0  

 

 Chloride  103 mmol/L    101-111  

 

 Co2 Carbon Dioxide  26 mmol/L    22-32  

 

 Anion Gap  8 mmol/L    2-11  

 

 Glucose  89 mg/dL      

 

 Blood Urea Nitrogen  21 mg/dL    6-24  

 

 Creatinine  1.02 mg/dL  High  0.51-0.95  

 

 BUN/Creatinine Ratio  20.6  High  8-20  

 

 Calcium  9.1 mg/dL    8.6-10.3  

 

 Total Protein  5.9 g/dL  Low  6.4-8.9  

 

 Albumin  3.1 g/dL  Low  3.2-5.2  

 

 Globulin  2.8 g/dL    2-4  

 

 Albumin/Globulin Ratio  1.1    1-3  

 

 Total Bilirubin  0.50 mg/dL    0.2-1.0  

 

 Alkaline Phosphatase  62 U/L      

 

 Alt  14 U/L    7-52  

 

 Ast  22 U/L    13-39  

 

 Egfr Non-  52.8    >60  

 

 Egfr   67.9    >60  39









 Laboratory test finding  2017  Magnesium  1.9 mg/dL    1.9-2.7  









 Lipase  13 U/L    11.0-82.0  

 

 C Reactive Protein  20.35 mg/L  High  < 5.00  40

 

 Troponin-I (TnI)  0.01 ng/mL    <0.04  

 

 TSH (Thyroid Stim Horm)  1.88 mcIU/mL    0.34-5.60  

 

 Folic Acid (Folate)  10.14 ng/mL    >3.99  

 

 Vitamin B12  407 pg/mL    180-914  41









 Inr/Protime  2017  Inr  1.02    0.89-1.11  

 

 Laboratory test finding  2017  Magnesium  1.7 mg/dL  Low  1.9-2.7  

 

 Comp Metabolic Panel  2017  Sodium  133 mmol/L    133-145  









 Potassium  4.0 mmol/L    3.5-5.0  

 

 Chloride  101 mmol/L    101-111  

 

 Co2 Carbon Dioxide  21 mmol/L  Low  22-32  

 

 Anion Gap  11 mmol/L    2-11  

 

 Glucose  106 mg/dL  High    

 

 Blood Urea Nitrogen  25 mg/dL  High  6-24  

 

 Creatinine  0.95 mg/dL    0.51-0.95  

 

 BUN/Creatinine Ratio  26.3  High  8-20  

 

 Calcium  9.5 mg/dL    8.6-10.3  

 

 Total Protein  6.4 g/dL    6.4-8.9  

 

 Albumin  3.7 g/dL    3.2-5.2  

 

 Globulin  2.7 g/dL    2-4  

 

 Albumin/Globulin Ratio  1.4    1-3  

 

 Total Bilirubin  1.30 mg/dL  High  0.2-1.0  

 

 Alkaline Phosphatase  61 U/L      

 

 Alt  9 U/L    7-52  

 

 Ast  22 U/L    13-39  

 

 Egfr Non-  57.3    >60  

 

 Egfr   73.8    >60  42









 Laboratory test finding  2017  B-Type Natriuretic  45 pg/mL      43



     Peptide BNP        

 

 CBC Auto Diff  2017  White Blood Count  9.7 10^3/uL    3.5-10.8  









 Red Blood Count  4.51 10^6/uL    4.0-5.4  

 

 Hemoglobin  12.3 g/dL    12.0-16.0  

 

 Hematocrit  38 %    35-47  

 

 Mean Corpuscular Volume  83 fL    80-97  

 

 Mean Corpuscular Hemoglobin  27 pg    27-31  

 

 Mean Corpuscular HGB Conc  33 g/dL    31-36  

 

 Red Cell Distribution Width  13 %    10.5-15  

 

 Platelet Count  182 10^3/uL    150-450  

 

 Mean Platelet Volume  9 um3    7.4-10.4  

 

 Abs Neutrophils  7.9 10^3/uL  High  1.5-7.7  

 

 Abs Lymphocytes  0.4 10^3/uL  Low  1.0-4.8  

 

 Abs Monocytes  1.3 10^3/uL  High  0-0.8  

 

 Abs Eosinophils  0 10^3/uL    0-0.6  

 

 Abs Basophils  0 10^3/uL    0-0.2  

 

 Abs Nucleated RBC  0 10^3/uL      

 

 Granulocyte %  81.7 %    38-83  

 

 Lymphocyte %  4.3 %  Low  25-47  

 

 Monocyte %  13.5 %  High  1-9  

 

 Eosinophil %  0.2 %    0-6  

 

 Basophil %  0.3 %    0-2  

 

 Nucleated Red Blood Cells %  0      









 Laboratory test  2017  D Dimer Quantitative  757 ng/mL  High  Less Than 
230  44



 finding            









 Lactic Acid  0.7 mmol/L    0.5-2.0  45









 Laboratory test finding  2017  TSH (Thyroid Stim Horm)  2.29 mcIU/mL    
0.34-5.60  

 

 CBC Auto Diff  2017  White Blood Count  7.6 10^3/uL    3.5-10.8  









 Red Blood Count  4.78 10^6/uL    4.0-5.4  

 

 Hemoglobin  13.1 g/dL    12.0-16.0  

 

 Hematocrit  40 %    35-47  

 

 Mean Corpuscular Volume  84 fL    80-97  

 

 Mean Corpuscular Hemoglobin  27 pg    27-31  

 

 Mean Corpuscular HGB Conc  32 g/dL    31-36  

 

 Red Cell Distribution Width  13 %    10.5-15  

 

 Platelet Count  180 10^3/uL    150-450  

 

 Mean Platelet Volume  9 um3    7.4-10.4  

 

 Abs Neutrophils  5.9 10^3/uL    1.5-7.7  

 

 Abs Lymphocytes  0.7 10^3/uL  Low  1.0-4.8  

 

 Abs Monocytes  0.9 10^3/uL  High  0-0.8  

 

 Abs Eosinophils  0 10^3/uL    0-0.6  

 

 Abs Basophils  0.1 10^3/uL    0-0.2  

 

 Abs Nucleated RBC  0.01 10^3/uL      

 

 Granulocyte %  77.4 %    38-83  

 

 Lymphocyte %  9.1 %  Low  25-47  

 

 Monocyte %  12.3 %  High  1-9  

 

 Eosinophil %  0.5 %    0-6  

 

 Basophil %  0.7 %    0-2  

 

 Nucleated Red Blood Cells %  0.1      









 Comp Metabolic Panel  2017  Sodium  140 mmol/L    133-145  









 Potassium  4.0 mmol/L    3.5-5.0  

 

 Chloride  105 mmol/L    101-111  

 

 Co2 Carbon Dioxide  26 mmol/L    22-32  

 

 Anion Gap  9 mmol/L    2-11  

 

 Glucose  85 mg/dL      

 

 Blood Urea Nitrogen  24 mg/dL    6-24  

 

 Creatinine  1.04 mg/dL  High  0.51-0.95  

 

 BUN/Creatinine Ratio  23.1  High  8-20  

 

 Calcium  10.0 mg/dL    8.6-10.3  

 

 Total Protein  6.2 g/dL  Low  6.4-8.9  

 

 Albumin  4.0 g/dL    3.2-5.2  

 

 Globulin  2.2 g/dL    2-4  

 

 Albumin/Globulin Ratio  1.8    1-3  

 

 Total Bilirubin  0.80 mg/dL    0.2-1.0  

 

 Alkaline Phosphatase  64 U/L      

 

 Alt  9 U/L    7-52  

 

 Ast  23 U/L    13-39  

 

 Egfr Non-  51.7    >60  

 

 Egfr   66.4    >60  46









 Laboratory test finding  2017  Glucose  86 mg/dL      









 Hemoglobin A1c (Glyco HGB)  5.6 %    Less than 6.0  47









 Pthi  2017  Calcium (PTH Intact)  10.0 mg/dL    8.6-10.3  









 PTH Intact  1.6 pmol/L    1.3-9.3  









 Laboratory test finding  2017   (Ovarian  1893.7 U/mL  High  0-35  
48



     Cancer Ag)        

 

 Laboratory test finding  2017  Vitamin D Total  15.2 ng/mL  Low  30-50  



     25(Oh)        

 

 Order  2017  EKG  <pending>      

 

 Urine Culture And  2017  Urine Culture  SEE RESULT BELOW      49



 Sensitivities            

 

 Ua Routine  2017  Ua Specific Gravity  1.015      









 Ua PH  5      

 

 Ua Color  cloudy      

 

 Ua Appera  red in color      

 

 Ua WBC  ++      

 

 Ua Protein  +++      

 

 Ua Glucose  noraml      

 

 Ua Ketones  negative      

 

 Ua Bilirubin  negative      

 

 Ua Urobilinogen  normal      

 

 Ua Nitrite  positive      

 

 Ua Occult Blood  large      









 Urine Culture And  2016  Urine Culture  SEE RESULT BELOW      50



 Sensitivities            

 

 Ua Routine  2016  Ua Specific Gravity  1.020      









 Ua PH  5      

 

 Ua Color  yellow      

 

 Ua Appera  cloudy      

 

 Ua WBC  small      

 

 Ua Protein  neg      

 

 Ua Glucose  neg      

 

 Ua Ketones  neg      

 

 Ua Bilirubin  neg      

 

 Ua Urobilinogen  neg      

 

 Ua Nitrite  neg      

 

 Ua Occult Blood  small      









 Laboratory test finding  2016  Lyme Disease Serology  Positive    
Negative  51

 

 Lyme Western Blot  2016  Lyme Disease IgG Ab WB  Positive    Negative  









 Lyme Disease IgG Bands Present  See Comment kDa      52

 

 Lyme Disease IgM Ab WB  Positive    Negative  

 

 Lyme Disease IgM Bands Present  p41, p39, kDa      

 

 Lyme Disease Interpretation  See Comment      53









 Lipid Profile (Trig/Chol/HDL)  2016  Triglycerides  67 mg/dL      54









 Cholesterol  179 mg/dL      55

 

 HDL Cholesterol  34.9 mg/dL      56

 

 LDL Cholesterol  131 mg/dL      57









 Basic Metabolic Panel  2016  Sodium  138 mmol/L    133-145  









 Potassium  4.2 mmol/L    3.5-5.0  

 

 Chloride  104 mmol/L    101-111  

 

 Co2 Carbon Dioxide  30 mmol/L    22-32  

 

 Anion Gap  4 mmol/L    2-11  

 

 Glucose  106 mg/dL  High    

 

 Blood Urea Nitrogen  24 mg/dL    6-24  

 

 Creatinine  0.91 mg/dL    0.51-0.95  

 

 BUN/Creatinine Ratio  26.4  High  8-20  

 

 Calcium  9.8 mg/dL    8.6-10.3  

 

 Egfr Non-  60.4    >60  

 

 Egfr   77.7    >60  58









 Laboratory test finding  2016  C Reactive Protein  5.03 mg/L  High  < 
5.00  59

 

 CBC Auto Diff  2016  White Blood Count  4.7 10^3/uL    3.5-10.8  









 Red Blood Count  4.92 10^6/uL    4.0-5.4  

 

 Hemoglobin  13.2 g/dL    12.0-16.0  

 

 Hematocrit  40 %    35-47  

 

 Mean Corpuscular Volume  82 fL    80-97  

 

 Mean Corpuscular Hemoglobin  27 pg    27-31  

 

 Mean Corpuscular HGB Conc  33 g/dL    31-36  

 

 Red Cell Distribution Width  14 %    10.5-15  

 

 Platelet Count  137 10^3/uL  Low  150-450  

 

 Mean Platelet Volume  8 um3    7.4-10.4  

 

 Abs Neutrophils  3.5 10^3/uL    1.5-7.7  

 

 Abs Lymphocytes  0.5 10^3/uL  Low  1.0-4.8  

 

 Abs Monocytes  0.7 10^3/uL    0-0.8  

 

 Abs Eosinophils  0 10^3/uL    0-0.6  

 

 Abs Basophils  0 10^3/uL    0-0.2  

 

 Abs Nucleated RBC  0 10^3/uL      

 

 Granulocyte %  74.4 %    38-83  

 

 Lymphocyte %  9.9 %  Low  25-47  

 

 Monocyte %  14.6 %  High  1-9  

 

 Eosinophil %  0.5 %    0-6  

 

 Basophil %  0.6 %    0-2  

 

 Nucleated Red Blood Cells %  0.1      









 Laboratory test finding  2016  Erythrocyte Sed Rate  29 mm/Hr    0-40  









 Lyme Disease Serology  Positive    Negative  60









 Lyme Western Blot  2016  Lyme Disease IgG Ab WB  Negative    Negative  









 Lyme Disease IgG Bands Present  p66, p41, p23, kDa      

 

 Lyme Disease IgM Ab WB  Positive    Negative  

 

 Lyme Disease IgM Bands Present  p41, p23, kDa      

 

 Lyme Disease Interpretation  See Comment      61









 1  Therapeutic target for the treatment of diabetes



   mellitus patients is <7% HBA1C, and in selective



   patients <6.0%.  Please refer to American Diabetes



   Association diabetic care guidelines for further



   information.

 

 2  SEE RESULT BELOW



   -----------------------------------------------------------------------------
---------------



   Name:  SUELLEN PATEL                 : 1940    Attend Dr: Prachi Hansen MD



   Acct:  [id number]  Unit: [id number]  AGE: 78            Location:  



   [id number]                        SEX: F             Status:    REG REF



   -----------------------------------------------------------------------------
---------------



   



   SPEC: [id number]            PARRISH:       [facility] DR: Prachi Hansen MD



   REQ:  [id number]             RECD: 



   STATUS: ADAM CHEN DR: Cameron Medrano MD



   _



   ORDERED:  FNA-IMG GUID BX, CY ADEQ-ADDL P/3, CYTO ADEQ-1ST P



   



   FINAL DIAGNOSIS



   



   



   



   Thyroid, right, Ultrasound guided fine needle



   aspiration:



   --Benign thyroid nodule- colloid/hyperplastic type



   ([place] Class II).



   



   



   



   



   



   



   



   



   The specimen demonstrates modest watery colloid, a scant



   amount of benign appearing follicular epithelium arranged in



   uniform sheets, medium sized follicles and only occasional



   small groups.  No features of papillary carcinoma are seen.



   In this clinical setting the risk of malignancy is less than



   3%.  Clinical management of this thyroid nodule should be



   based on clinical and radiographic features as well as the



   above.



   



   THYROID RIGHT - US GUIDED RIGHT THYROID FINE NEEDLE ASPIRATION



   



   CLINICAL HISTORY



   



   3.0 x 1.7 x 1.9 cm right thyroid nodule.



   



   IMMEDIATE INTERPRETATION



   



   Passes 1-4 inadequate



   



   



   



   ** CONTINUED ON NEXT PAGE **



   



   DEPARTMENT OF PATHOLOGY,  [address] [place] [address]



   Phone # [phone]      Fax #[phone]



   Luis Jung M.D. Director     [facility] # [id number]



   



   



   



   RUN DATE: 18               [facility] **LIVE**         
       PAGE    2



   



   -----------------------------------------------------------------------------
---------------



   Patient: SUELLEN PATEL                  [id number]     (Continued)



   -----------------------------------------------------------------------------
---------------



   



   GROSS DESCRIPTION          (Continued)



   



   



   GROSS DESCRIPTION



   



   Ultrasound guided, fine needle aspiration x 4 passes with 13 Alcohol fixed 
slide(s).



   



   



   Signed by and Reported on: __________              Luis Jung MD  [id number]



   



   -----------------------------------------------------------------------------
---------------



   



   



   



   



   



   



   



   



   



   



   



   



   



   



   



   



   



   



   



   



   



   



   



   



   



   



   



   



   



   



   



   



   



   



   ** END OF REPORT **



   



   DEPARTMENT OF PATHOLOGY,  [address] [place] [address]



   Phone # [phone]      Fax #[phone]



   Luis Jung M.D. Director     [facility] # [id number]

 

 3  *******Because ethnic data is not always readily available,



   this report includes an eGFR for both -Americans and



   non- Americans.****



   The National Kidney Disease Education Program (NKDEP) does



   not endorse the use of the MDRD equation for patients that



   are not between the ages of 18 and 70, are pregnant, have



   extremes of body size, muscle mass, or nutritional status,



   or are non- or non-.



   According to the National Kidney Foundation, irrespective of



   diagnosis, the stage of the disease is based on the level of



   kidney function:



   Stage Description                      GFR(mL/min/1.73 m(2))



   1     Kidney damage with normal or decreased GFR       90



   2     Kidney damage with mild decrease in GFR          60-89



   3     Moderate decrease in GFR                         30-59



   4     Severe decrease in GFR                           15-29



   5     Kidney failure                       <15 (or dialysis)

 

 4  *******Because ethnic data is not always readily available,



   this report includes an eGFR for both -Americans and



   non- Americans.****



   The National Kidney Disease Education Program (NKDEP) does



   not endorse the use of the MDRD equation for patients that



   are not between the ages of 18 and 70, are pregnant, have



   extremes of body size, muscle mass, or nutritional status,



   or are non- or non-.



   According to the National Kidney Foundation, irrespective of



   diagnosis, the stage of the disease is based on the level of



   kidney function:



   Stage Description                      GFR(mL/min/1.73 m(2))



   1     Kidney damage with normal or decreased GFR       90



   2     Kidney damage with mild decrease in GFR          60-89



   3     Moderate decrease in GFR                         30-59



   4     Severe decrease in GFR                           15-29



   5     Kidney failure                       <15 (or dialysis)

 

 5  Pancytopenia with absolute neutropenia, normocytic anemia,



   and marked thrombocytopenia.  No evidence of a hemolytic



   process.



   



   Reviewed by Larissa Lara MD

 

 6  SEE RESULT BELOW



   -----------------------------------------------------------------------------
---------------



   Name:  SUELLEN PATEL JACK                 : 1940    Attend Dr: Vero Person MD



   Acct:  [id number]  Unit: [id number]  AGE: 77            Location:  [place] [address]



   Re17                        SEX: F             Status:    ADM IN



   -----------------------------------------------------------------------------
---------------



   



   SPEC: [id number]         PARRISH:       [facility] DR: Barber Odom MD



   REQ:  [id number]              RECD:   



   STATUS: RES              [facility] DR: Prachi Hansen MD



   _



   SOURCE: BLOOD,VENO     SPDESC:



   ORDERED:  Blood Cult



   COMMENTS: COLLECTED FROM LAC



   



   -----------------------------------------------------------------------------
---------------



   Procedure                         Result                         Reported   
        Site



   -----------------------------------------------------------------------------
---------------



   Aerobic Culture Bottle  Preliminary                              17-
1425      ML



   No Growth Day 1



   



   Anaerobic Culture Bottle  Preliminary                            17-
1425      ML



   No Growth Day 1



   



   -----------------------------------------------------------------------------
---------------



   * ML - MAIN LAB ([facility])



   .



   



   



   



   



   



   



   



   



   



   



   



   



   



   



   



   



   



   



   



   



   



   



   



   ** END OF REPORT **



   



   * ML=Testing performed at Main Lab



   DEPARTMENT OF PATHOLOGY,  [address] [place] [address]



   Phone # [phone]      Fax #[phone]



   Luis Jung M.D. Director     [facility] # [id number]

 

 7  Differential performed on buffy coat.

 

 8  Verbal to [id number] by [id number] at 1534 on 17.



   Results read back accurately.

 

 9  Result TnIDx:0.07 Called to [id number] at: 14:46:42 by:[id number] Read back by:
[id number]

 

 10  *******Because ethnic data is not always readily available,



   this report includes an eGFR for both -Americans and



   non- Americans.****



   The National Kidney Disease Education Program (NKDEP) does



   not endorse the use of the MDRD equation for patients that



   are not between the ages of 18 and 70, are pregnant, have



   extremes of body size, muscle mass, or nutritional status,



   or are non- or non-.



   According to the National Kidney Foundation, irrespective of



   diagnosis, the stage of the disease is based on the level of



   kidney function:



   Stage Description                      GFR(mL/min/1.73 m(2))



   1     Kidney damage with normal or decreased GFR       90



   2     Kidney damage with mild decrease in GFR          60-89



   3     Moderate decrease in GFR                         30-59



   4     Severe decrease in GFR                           15-29



   5     Kidney failure                       <15 (or dialysis)

 

 11  Please note the change in INR reference range effective



   17.

 

 12  [facility] Severe Sepsis and Septic Shock Management Bundle Measure



   requires all lactic acids initially measuring >2.0 mmol/L be



   repeated.

 

 13  [facility] Severe Sepsis and Septic Shock Management Bundle Measure



   requires all lactic acids initially measuring >2.0 mmol/L be



   repeated.

 

 14  *******Because ethnic data is not always readily available,



   this report includes an eGFR for both -Americans and



   non- Americans.****



   The National Kidney Disease Education Program (NKDEP) does



   not endorse the use of the MDRD equation for patients that



   are not between the ages of 18 and 70, are pregnant, have



   extremes of body size, muscle mass, or nutritional status,



   or are non- or non-.



   According to the National Kidney Foundation, irrespective of



   diagnosis, the stage of the disease is based on the level of



   kidney function:



   Stage Description                      GFR(mL/min/1.73 m(2))



   1     Kidney damage with normal or decreased GFR       90



   2     Kidney damage with mild decrease in GFR          60-89



   3     Moderate decrease in GFR                         30-59



   4     Severe decrease in GFR                           15-29



   5     Kidney failure                       <15 (or dialysis)

 

 15  *******Because ethnic data is not always readily available,



   this report includes an eGFR for both -Americans and



   non- Americans.****



   The National Kidney Disease Education Program (NKDEP) does



   not endorse the use of the MDRD equation for patients that



   are not between the ages of 18 and 70, are pregnant, have



   extremes of body size, muscle mass, or nutritional status,



   or are non- or non-.



   According to the National Kidney Foundation, irrespective of



   diagnosis, the stage of the disease is based on the level of



   kidney function:



   Stage Description                      GFR(mL/min/1.73 m(2))



   1     Kidney damage with normal or decreased GFR       90



   2     Kidney damage with mild decrease in GFR          60-89



   3     Moderate decrease in GFR                         30-59



   4     Severe decrease in GFR                           15-29



   5     Kidney failure                       <15 (or dialysis)

 

 16  Leukocytosis with absolute neutrophilia and moderate



   thrombocytopenia noted.  Reactive/inflammatory process is



   favored.  Additional studies should be considered as



   clinically warranted.

 

 17  Result TnIDx:0.07 Called to [id number] at: 10:48:09 by:[id number] Read back by:
[id number]

 

 18  *******Because ethnic data is not always readily available,



   this report includes an eGFR for both -Americans and



   non- Americans.****



   The National Kidney Disease Education Program (NKDEP) does



   not endorse the use of the MDRD equation for patients that



   are not between the ages of 18 and 70, are pregnant, have



   extremes of body size, muscle mass, or nutritional status,



   or are non- or non-.



   According to the National Kidney Foundation, irrespective of



   diagnosis, the stage of the disease is based on the level of



   kidney function:



   Stage Description                      GFR(mL/min/1.73 m(2))



   1     Kidney damage with normal or decreased GFR       90



   2     Kidney damage with mild decrease in GFR          60-89



   3     Moderate decrease in GFR                         30-59



   4     Severe decrease in GFR                           15-29



   5     Kidney failure                       <15 (or dialysis)

 

 19  [facility] Severe Sepsis and Septic Shock Management Bundle Measure



   requires all lactic acids initially measuring >2.0 mmol/L be



   repeated.

 

 20  >100 to <200 pg/mL: likely compensated congestive heart



   failure (CHF)



   200 to 400 pg/mL: likely moderate CHF



   >400 pg/mL: likely moderate to severe CHF

 

 21  Result TnIDx:0.07 Called to [id number] at: 14:50:10 by:[id number] Read back by:
[id number]

 

 22  [id number]

 

 23  [id number]

 

 24  [id number]

 

 25  *******Because ethnic data is not always readily available,



   this report includes an eGFR for both -Americans and



   non- Americans.****



   The National Kidney Disease Education Program (NKDEP) does



   not endorse the use of the MDRD equation for patients that



   are not between the ages of 18 and 70, are pregnant, have



   extremes of body size, muscle mass, or nutritional status,



   or are non- or non-.



   According to the National Kidney Foundation, irrespective of



   diagnosis, the stage of the disease is based on the level of



   kidney function:



   Stage Description                      GFR(mL/min/1.73 m(2))



   1     Kidney damage with normal or decreased GFR       90



   2     Kidney damage with mild decrease in GFR          60-89



   3     Moderate decrease in GFR                         30-59



   4     Severe decrease in GFR                           15-29



   5     Kidney failure                       <15 (or dialysis)

 

 26  Consistent with previous results on 17.

 

 27  *******Because ethnic data is not always readily available,



   this report includes an eGFR for both -Americans and



   non- Americans.****



   The National Kidney Disease Education Program (NKDEP) does



   not endorse the use of the MDRD equation for patients that



   are not between the ages of 18 and 70, are pregnant, have



   extremes of body size, muscle mass, or nutritional status,



   or are non- or non-.



   According to the National Kidney Foundation, irrespective of



   diagnosis, the stage of the disease is based on the level of



   kidney function:



   Stage Description                      GFR(mL/min/1.73 m(2))



   1     Kidney damage with normal or decreased GFR       90



   2     Kidney damage with mild decrease in GFR          60-89



   3     Moderate decrease in GFR                         30-59



   4     Severe decrease in GFR                           15-29



   5     Kidney failure                       <15 (or dialysis)

 

 28  >100 to <200 pg/mL: likely compensated congestive heart



   failure (CHF)



   200 to 400 pg/mL: likely moderate CHF



   >400 pg/mL: likely moderate to severe CHF

 

 29  Critical Result LACT:2.2 Called to [id number] at: 08:48:48 by:[id number] Read 
back



   by:[id number]



   [facility] Severe Sepsis and Septic Shock Management Bundle Measure



   requires all lactic acids initially measuring >2.0 mmol/L be



   repeated.

 

 30  SEE RESULT BELOW



   -----------------------------------------------------------------------------
---------------



   Name:  SUELLEN PATEL                 : 1940    Attend Dr: Lukas Chicas MD



   Acct:  [id number]  Unit: [id number]  AGE: 77            Location:  [place] [address]



   Reg:   10/29/17                        SEX: F             Status:    ADM IN



   -----------------------------------------------------------------------------
---------------



   



   SPEC: [id number]         PARRISH:   10/29/    [facility] DR: Amos Garland MD



   REQ:  [id number]              RECD:   10/29/



   STATUS: RES              [facility] DR: Prachi Hansen MD



   _



   SOURCE: BLOOD,VENO     SPDESC:



   ORDERED:  Blood Cult



   COMMENTS: Patient is On Antibiotics? NO



   



   -----------------------------------------------------------------------------
---------------



   Procedure                         Result                         Reported   
        Site



   -----------------------------------------------------------------------------
---------------



   Aerobic Culture Bottle  Preliminary                              10/30/17-
0826      ML



   No Growth Day 1



   



   Anaerobic Culture Bottle  Preliminary                            10/30/17-
826      ML



   No Growth Day 1



   



   -----------------------------------------------------------------------------
---------------



   * ML - MAIN LAB ([facility])



   .



   



   



   



   



   



   



   



   



   



   



   



   



   



   



   



   



   



   



   



   



   



   



   



   ** END OF REPORT **



   



   * ML=Testing performed at Main Lab



   DEPARTMENT OF PATHOLOGY,  [address] [place] [address]



   Phone # [phone]      Fax #[phone]



   Luis Jung M.D. Director     [facility] # [id number]

 

 31  Progressive normocytic anemia with thrombocytopenia noted.



   No schistocytes or blasts are identified.  Additional



   studies as clinically warranted.



   



   



   Reviewed by Dr. Jung

 

 32  *******Because ethnic data is not always readily available,



   this report includes an eGFR for both -Americans and



   non- Americans.****



   The National Kidney Disease Education Program (NKDEP) does



   not endorse the use of the MDRD equation for patients that



   are not between the ages of 18 and 70, are pregnant, have



   extremes of body size, muscle mass, or nutritional status,



   or are non- or non-.



   According to the National Kidney Foundation, irrespective of



   diagnosis, the stage of the disease is based on the level of



   kidney function:



   Stage Description                      GFR(mL/min/1.73 m(2))



   1     Kidney damage with normal or decreased GFR       90



   2     Kidney damage with mild decrease in GFR          60-89



   3     Moderate decrease in GFR                         30-59



   4     Severe decrease in GFR                           15-29



   5     Kidney failure                       <15 (or dialysis)

 

 33  Pancytopenia with absolute neutropenia, normocytic anemia,



   and moderate thrombocytopenia.  No evidence of a hemolytic



   process.



   



   Reviewed by Larissa Lara MD

 

 34  SEE RESULT BELOW



   -----------------------------------------------------------------------------
---------------



   Name:  SUELLEN PATEL                 : 1940    Attend Dr: Paramjit Trujillo MD



   Acct:  [id number]  Unit: [id number]  AGE: 77            Location:  [place] [address]



   Re17                        SEX: F             Status:    ADM Judit



   -----------------------------------------------------------------------------
---------------



   



   SPEC: [id number]         PARRISH:       [facility] DR: Emmanuel Brown MD



   REQ:  [id number]              RECD:   



   STATUS: COMP             [facility] DR: Prachi Hansen MD



   _



   SOURCE: URINE          SPDESC:



   ORDERED:  Urine Culture



   



   -----------------------------------------------------------------------------
---------------



   Procedure                         Result                         Reported   
        Site



   -----------------------------------------------------------------------------
---------------



   Urine Culture  Final                                             17-
852      ML



   



   No growth of clinically significant organisms



   



   -----------------------------------------------------------------------------
---------------



   * ML - MAIN LAB ([facility])



   .



   



   



   



   



   



   



   



   



   



   



   



   



   



   



   



   



   



   



   



   



   



   



   



   



   



   



   ** END OF REPORT **



   



   * ML=Testing performed at Main Lab



   DEPARTMENT OF PATHOLOGY,  [address] [place] [address]



   Phone # [phone]      Fax #[phone]



   Luis Jung M.D. Director     [facility] # [id number]

 

 35  [facility] Severe Sepsis and Septic Shock Management Bundle Measure



   requires all lactic acids initially measuring >2.0 mmol/L be



   repeated.

 

 36  *******Because ethnic data is not always readily available,



   this report includes an eGFR for both -Americans and



   non- Americans.****



   The National Kidney Disease Education Program (NKDEP) does



   not endorse the use of the MDRD equation for patients that



   are not between the ages of 18 and 70, are pregnant, have



   extremes of body size, muscle mass, or nutritional status,



   or are non- or non-.



   According to the National Kidney Foundation, irrespective of



   diagnosis, the stage of the disease is based on the level of



   kidney function:



   Stage Description                      GFR(mL/min/1.73 m(2))



   1     Kidney damage with normal or decreased GFR       90



   2     Kidney damage with mild decrease in GFR          60-89



   3     Moderate decrease in GFR                         30-59



   4     Severe decrease in GFR                           15-29



   5     Kidney failure                       <15 (or dialysis)

 

 37  Verbal to [id number] by [id number] at 0311 on 17.



   Results read back accurately.

 

 38  [facility] Severe Sepsis and Septic Shock Management Bundle Measure



   requires all lactic acids initially measuring >2.0 mmol/L be



   repeated.

 

 39  *******Because ethnic data is not always readily available,



   this report includes an eGFR for both -Americans and



   non- Americans.****



   The National Kidney Disease Education Program (NKDEP) does



   not endorse the use of the MDRD equation for patients that



   are not between the ages of 18 and 70, are pregnant, have



   extremes of body size, muscle mass, or nutritional status,



   or are non- or non-.



   According to the National Kidney Foundation, irrespective of



   diagnosis, the stage of the disease is based on the level of



   kidney function:



   Stage Description                      GFR(mL/min/1.73 m(2))



   1     Kidney damage with normal or decreased GFR       90



   2     Kidney damage with mild decrease in GFR          60-89



   3     Moderate decrease in GFR                         30-59



   4     Severe decrease in GFR                           15-29



   5     Kidney failure                       <15 (or dialysis)

 

 40  Acute inflammation:  >10.00

 

 41  Normal Range 180 to 914



   Indeterminate Range 145 to 180



   Deficient Range  <145

 

 42  *******Because ethnic data is not always readily available,



   this report includes an eGFR for both -Americans and



   non- Americans.****



   The National Kidney Disease Education Program (NKDEP) does



   not endorse the use of the MDRD equation for patients that



   are not between the ages of 18 and 70, are pregnant, have



   extremes of body size, muscle mass, or nutritional status,



   or are non- or non-.



   According to the National Kidney Foundation, irrespective of



   diagnosis, the stage of the disease is based on the level of



   kidney function:



   Stage Description                      GFR(mL/min/1.73 m(2))



   1     Kidney damage with normal or decreased GFR       90



   2     Kidney damage with mild decrease in GFR          60-89



   3     Moderate decrease in GFR                         30-59



   4     Severe decrease in GFR                           15-29



   5     Kidney failure                       <15 (or dialysis)

 

 43  >100 to <200 pg/mL: likely compensated congestive heart



   failure (CHF)



   200 to 400 pg/mL: likely moderate CHF



   >400 pg/mL: likely moderate to severe CHF

 

 44  **Please note:



   The following may produce a false positive D Dimer test:



   - Rheumatoid factor greater than 60 IU/ml



   - Plasma hemoglobin greater than 0.05 gm/dl



   - Bilirubin greater than 50 mg/dl



   - Lipids greater than 1000 mg/dl



   - FDP greater than 20 ug/ml

 

 45  [facility] Severe Sepsis and Septic Shock Management Bundle Measure



   requires all lactic acids initially measuring >2.0 mmol/L be



   repeated.

 

 46  *******Because ethnic data is not always readily available,



   this report includes an eGFR for both -Americans and



   non- Americans.****



   The National Kidney Disease Education Program (NKDEP) does



   not endorse the use of the MDRD equation for patients that



   are not between the ages of 18 and 70, are pregnant, have



   extremes of body size, muscle mass, or nutritional status,



   or are non- or non-.



   According to the National Kidney Foundation, irrespective of



   diagnosis, the stage of the disease is based on the level of



   kidney function:



   Stage Description                      GFR(mL/min/1.73 m(2))



   1     Kidney damage with normal or decreased GFR       90



   2     Kidney damage with mild decrease in GFR          60-89



   3     Moderate decrease in GFR                         30-59



   4     Severe decrease in GFR                           15-29



   5     Kidney failure                       <15 (or dialysis)

 

 47  Therapeutic target for the treatment of diabetes



   [place] patients is <7% HBA1C, and in selective



   patients <6.0%.Please refer to American Diabetes



   Association Diabetic care guidelines for further



   information.

 

 48  Instrument used is Lissy Pastor . The assay is a



   two-site immunoenzymatic "sandwich" assay. Do not interpret



    levels as absolute evidence of the presence or the



   absence of malignant disease. Use in conjunction with



   information from the clinical evaluation of the patient and



   other diagnostic procedures. Values obtained with different



   assay methods cannot be used interchangeably.

 

 49  SEE RESULT BELOW



   -----------------------------------------------------------------------------
---------------



   Name:  SUELLEN PATEL                 : 1940    Attend Dr: Prachi Hansen MD



   Acct:  [id number]  Unit: [id number]  AGE: 76            Location:  [facility]



   Re17                        SEX: F             Status:    REG REF



   -----------------------------------------------------------------------------
---------------



   



   SPEC: [id number]         PARRISH:       [facility] DR: Prachi Hansen MD



   REQ:  [id number]              RECD:   



   STATUS: COMP



   _



   SOURCE: URINE          SPDESC:



   ORDERED:  Urine Culture



   Urine Source: Random



   



   -----------------------------------------------------------------------------
---------------



   Procedure                         Result                         Reported   
        Site



   -----------------------------------------------------------------------------
---------------



   Urine Culture  Final                                             17-
822      ML



   



   Organism 1                     ESCHERICHIA COLI



   Anderson Count                >100,000 (Many) CFU/ML



   



   1. ESCHERICHIA COLI



   M.I.C.      RX



   --------- ------



   Ampicillin                     >=32        R



   Cefazolin                      <=4         S



   Cefepime                       <=1         S



   Ceftriaxone                    <=1         S



   Ciprofloxacin                  <=0.25      S



   Gentamicin                     <=1         S



   Levofloxacin                   <=0.12      S



   Meropenem                      <=0.25      S



   Nitrofurantoin                 <=16        S



   Tetracycline                   <=1         S



   Pipercillin/Tazobactam         <=4         S



   Trimethoprim/Sulfamethoxazole  <=20        S



   Amoxicillin/Clavulanic Acid    8           S



   Aztreonam                      <=1         S



   



   



   Contact the Microbiology Department for any additional



   antibiotic reporting.



   



   -----------------------------------------------------------------------------
---------------



   * ML - MAIN LAB ([facility])



   .



   



   ** END OF REPORT **



   



   * ML=Testing performed at Main Lab



   DEPARTMENT OF PATHOLOGY,  [address] [place] [address]



   Phone # [phone]      Fax #[phone]



   Luis Jung M.D. Director     [facility] # [id number]

 

 50  SEE RESULT BELOW



   -----------------------------------------------------------------------------
---------------



   Name:  SUELLEN PATEL                 : 1940    Attend Dr: 
Vikki Richardson NP



   Acct:  [id number]  Unit: [id number]  AGE: 76            Location:  [facility]



   Re16                        SEX: F             Status:    REG REF



   -----------------------------------------------------------------------------
---------------



   



   SPEC: [id number]         PARRISH:       SUBM DR: Vikki Richardson NP



   REQ:  [id number]              RECD:   



   STATUS: COMP



   _



   SOURCE: URINE          SPDESC:



   ORDERED:  Urine Culture



   COMMENTS: [id number]



   Urine Source: Random



   



   -----------------------------------------------------------------------------
---------------



   Procedure                         Result                         Reported   
        Site



   -----------------------------------------------------------------------------
---------------



   Urine Culture  Final                                             16-
0906      ML



   



   Few Enterobacteriacae; possible contamination.



   



   -----------------------------------------------------------------------------
---------------



   * ML - MAIN LAB ([facility])



   .



   



   



   



   



   



   



   



   



   



   



   



   



   



   



   



   



   



   



   



   



   



   



   



   ** END OF REPORT **



   



   * ML=Testing performed at Main Lab



   DEPARTMENT OF PATHOLOGY,  [address] [place] [address]



   Phone # [phone]      Fax #[phone]



   Luis Jung M.D. Director     [facility] # [id number]

 

 51  Not diagnostic. Supplemental testing ordered by reflex.



   Test Performed by:



   [place] [address]



   : Justin Smith II, M.D., Ph.D.

 

 52  RESULT: p66, p41, p30, p23, p18,

 

 53  Consistent with active or previous infection for B.



   burgdorferi.



   IgM blot criteria is of diagnostic utility only during the



   first 4 weeks of early Lyme disease.



   -------------------ADDITIONAL INFORMATION-------------------



   CDC criteria require >=5 bands for IgG or >=2 bands for IgM



   for the Immunoblot to be considered positive. Bands



   (e.g.,p41) may be detected in patients without Lyme



   disease, and patterns not meeting the CDC criteria should



   be interpreted with caution.



   Immunoblot should be ordered only on specimens that are



   positive or equivocal by a FDA-licensed Lyme disease



   antibody screening test (e.g., EIA).



   Test Performed by:



   [address] [address]



   : Justin Smith II, M.D., Ph.D.

 

 54  Desirable <150



   Borderline high 150-199



   High 200-499



   Very High >500

 

 55  Desirable <200



   Borderline high 200-239



   High >239

 

 56  Low <40



   Desirable: 40-60



   High: >60

 

 57  Desirable: <100 mg/dL



   Near Optimal: 100-129 mg/dL



   Borderline High: 130-159 mg/dL



   High: 160-189 mg/dL



   Very High: >189 mg/dL

 

 58  *******Because ethnic data is not always readily available,



   this report includes an eGFR for both -Americans and



   non- Americans.****



   The National Kidney Disease Education Program (NKDEP) does



   not endorse the use of the MDRD equation for patients that



   are not between the ages of 18 and 70, are pregnant, have



   extremes of body size, muscle mass, or nutritional status,



   or are non- or non-.



   According to the National Kidney Foundation, irrespective of



   diagnosis, the stage of the disease is based on the level of



   kidney function:



   Stage Description                      GFR(mL/min/1.73 m(2))



   1     Kidney damage with normal or decreased GFR       90



   2     Kidney damage with mild decrease in GFR          60-89



   3     Moderate decrease in GFR                         30-59



   4     Severe decrease in GFR                           15-29



   5     Kidney failure                       <15 (or dialysis)

 

 59  Acute inflammation:  >10.00

 

 60  Not diagnostic. Supplemental testing ordered by reflex.



   Test Performed by:



   [place] [address]



   : Justin Smith II, M.D., Ph.D.

 

 61  Consistent with early infection with Borrelia burgdorferi.



   A new serum specimen should be submitted in 14-21 days to



   demonstrate seroconversion of IgG.



   IgM blot criteria is of diagnostic utility only during the



   first 4 weeks of early Lyme disease.



   -------------------ADDITIONAL INFORMATION-------------------



   CDC criteria require >=5 bands for IgG or >=2 bands for IgM



   for the Immunoblot to be considered positive. Bands



   (e.g.,p41) may be detected in patients without Lyme



   disease, and patterns not meeting the CDC criteria should



   be interpreted with caution.



   Immunoblot should be ordered only on specimens that are



   positive or equivocal by a FDA-licensed Lyme disease



   antibody screening test (e.g., EIA).



   Test Performed by:



   [place] [address]



   : Justin Smith II, M.D., Ph.D.







Procedures







 Date  CPT Code  Description  Status

 

 10/02/2018  10426  EKG Tracing & Interpretation  Completed

 

 2018    Mammogram  Completed

 

 2018    Bone Mineral Density Test  Completed

 

 03/15/2018  23654  EKG Tracing & Interpretation  Completed

 

 2017  64262  Event Monitor/Phys Review/Interp.  Completed

 

 2017  16639  EKG Tracing & Interpretation  Completed

 

 10/30/2017  17750  ECHO Transthorasic Realtime 2D W Doppler & Color Flow  
Completed



     Hosp  

 

 10/30/2017  17640  EKG, Interpretation Only  Completed

 

 08/10/2017  55824  Nerve Conduction 05-06 Studies  Completed

 

 08/10/2017  41714  Needle Electromyography Complete, Five Or More Muscles  
Completed



     Studied  

 

 2017  60876  EKG Tracing & Interpretation  Completed

 

 2017  00598  ECHO Transthoracic, Real-Time 2D With Doppler And Color  
Completed



     Flow  

 

 2011    Diabetic Foot Exam  Completed

 

 2010    Bone Mineral Density Test  Completed

 

 2010  95775  EKG Tracing & Interpretation  Completed

 

 2009  80379  Holter Monitor Review (24 hr)dr review & interp only  
Completed

 

 07/15/2009  96006  EKG Tracing & Interpretation  Completed

 

 2008    Mammogram  Completed

 

 2008  36310  EKG Tracing & Interpretation  Completed

 

 2008  84265  EKG Tracing & Interpretation  Completed







Encounters







 Type  Date  Location  Provider  CPT E/M  Dx

 

 Office Visit  2018  8:40a  [facility]  Best Montenegro MD  [address]  
E04.1



     [facility]      









 C82.98









 Office Visit  2018  8:10a  [facility]  Prachi Hansen M.D.  
67208  E04.1



     - [place]      









 R13.12









 Office Visit  2018 10:50a  [facility]  Prachi Hansen M.D.  
81160  J94.9



     - [place]      









 K76.89

 

 E04.1









 Office Visit  03/15/2018 11:50a  [facility]  Prachi Hansen M.D.  
48686  F43.0



     - [place]      









 F51.02

 

 E83.42

 

 R73.9









 Office Visit  03/15/2018  3:00p  [facility]  Jorge Alberto Willett,  
23984  E83.42



     Mirian CARTY    









 I48.0









 Office Visit  2018 11:50a  [facility] -  Prachi Hansen M.D.
  [address]18  Z23



     [place]      









 Z12.11

 

 Z12.31

 

 M85.89

 

 C82.90

 

 Z71.89

 

 Z92.21









 Office Visit  2017  2:49p  [facility]  Paramjit Trujillo,  [address]  
R50.81



     [facility]  [facility]    









 D64.9

 

 D70.9

 

 D69.6









 Office Visit  2017  2:49p  [facility] Ronnie,  [address]  
R50.81



     [facility]  MCapoDCapo    









 D64.9

 

 D70.9

 

 D69.6









 Office Visit  2017  2:48p  [facility]  Paramjit Trujillo,  [address]162  
R50.81



     [facility]  MARIES    









 D64.9

 

 D70.9

 

 D69.6









 Office Visit  12/15/2017  2:47p  [facility]  Vero Viera,  
47344  R50.81



     Hospitalists  M.DCapo    









 D64.9

 

 D70.9

 

 D69.6









 Office Visit  2017  2:47p  [facility]  Vero Viera,  
44197  R50.81



     Hospitalists  M.DCapo    









 D70.9

 

 D64.9

 

 D69.6









 Office Visit  2017  2:46p  [facility]  Vero Viera,  
83810  R50.81



     Hospitalists  M.DCapo    









 D70.9

 

 D64.9

 

 D69.6









 Office Visit  2017  2:45p  [facility]  Noreen Nicolas,  [address]24  
R50.81



     [facility]  NP    









 D70.9

 

 D64.9

 

 D69.6









 Office Visit  2017  3:52p  [facility]  Patito Hendrickson,  
05712  I48.0



     Hospitalists  MARIES    









 R74.8

 

 D72.829









 Office Visit  2017  2:00p  [facility]  Jorge Alberto Willett M.D.  
71324  I48.0









 C82.98

 

 D61.810

 

 J90









 Office Visit  2017  3:44p  [facility]  Patito Hendrickson,  54083  I48.91



     [facility]  M.D.    









 C82.98

 

 D61.810

 

 D69.6









 Office Visit  2017  4:26p  [facility]  Jorge Alberto Willett M.D.  
53737  I48.0









 C82.98

 

 J90

 

 I30.9

 

 D70.9

 

 D69.6









 Office Visit  2017  3:43p  [facility]  Patito Madhavi,  10214  I48.91



     [facility]  M.D.    









 C82.98

 

 D61.810

 

 D69.6









 Office Visit  2017  3:43p  [facility]  Michael Flood MD  24579
  I48.91



     Hospitalists      









 C82.98

 

 D69.6

 

 D61.810









 Office Visit  10/31/2017  3:42p  [facility]  Michael Flood MD  [address]58
  I48.91



     Hospitalists      









 C82.98

 

 D61.810

 

 J90









 Office Visit  10/30/2017  3:42p  [facility]  Lukas Chicas MD  
97714  I48.91



     Hospitalists      









 D61.810

 

 C82.98

 

 J90









 Office Visit  10/29/2017  3:41p  [facility]  Erma Montes,  
39251  C82.98



     Hospitalists  D.O.    









 D64.81

 

 D69.6

 

 I48.91









 Office Visit  2017  1:55p  [facility]  Paramjit Trujillo,  
31720  R11.2



     Hospitalists  M.D.    









 C82.90

 

 D61.818









 Office Visit  2017  1:55p  [facility]  Valdez Marquez M.D.  
20831  R11.2



     Hospitalists      









 C82.90

 

 D61.818









 Office Visit  2017  8:11a  [facility]  Maxi Schmitt,  
88115  C82.93



     Hospitalists  M.D.    









 R18.0

 

 R55

 

 N13.30









 Office Visit  2017 10:25a  [facility]  Chandu Sanford MD  
79922  G60.9

 

 Office Visit  08/10/2017 10:24a  [facility]  Chandu Sanford MD  
32305  G60.9









 R55

 

 R26.89









 Office Visit  08/10/2017  8:10a  [facility]  Maxi Summerfield,  
87778  C82.93



     Hospitalists  M.D.    









 R55

 

 N13.30

 

 R18.0









 Office Visit  2017 10:24a  [facility]  Chandu Sanford MD  
13497  R26.89









 R42

 

 H53.8









 Office Visit  2017  8:09a  [facility],  Maxi Summerfield,  
87099  C82.93



     Hospitalists  M.D.    









 R18.0

 

 N13.30

 

 R55









 Office Visit  2017 10:40a  [facility]  Prachi Hansen,  [address]16
  R19.09



     - Rena CARTY    









 N13.39









 Office Visit  2017  1:00p  [facility]  Prachi Hansen M.D.  
65635  R14.0



     - [place]      









 F43.0

 

 R19.09

 

 N13.30

 

 R18.8









 Office Visit  2017  7:30a  [facility]  Prachi Hansen,  27192
  Z00.00



     - Rena CARTY    









 I10

 

 M85.89

 

 R05

 

 R73.01

 

 N39.3

 

 Z12.39

 

 L57.0









 Office Visit  2017  1:20p  [facility]  Prachi Hansen M.D.  
15911  N39.0



     - [place]      









 R01.1

 

 R73.01









 Office Visit  2016 11:40a  [facility]  Vikki Richardson, N.PCapo  
24689  R35.0



     - [place]      









 N39.0









 Office Visit  2016  4:00p  [facility] -  Amos Joy,  
43416  R21



     Rena CARTY    









 R73.01

 

 Z23









 Office Visit  2016  8:30a  [facility]  Pedro Aannd,  
16050  A69.20



     Infectious Diseases  M.D.    









 G47.01









 Office Visit  2016 10:20a  [facility]  Amos Joy,  
70866  A69.20



     - Rena CARTY    









 Z13.220

 

 Z13.1









 Office Visit  2016 10:00a  [facility]  Amos Joy,  
56864  R21



     - [place]  M.D.    

 

 Office Visit  2016  4:20p  [facility]  Mani Ruvalcaba, [place]  [address]  
L03.114



     - [place]      

 

 Office Visit  2012 11:20a  [facility]  Vikki Richardson, N.P.  
68425  719.47



     - [place]      









 V04.81









 Office Visit  2011  1:20p  DO Not Use [facility]  Vikki Richardson,  
09358  729.4



       N.P.    

 

 Office Visit  2010 11:30a  [facility] Not Use [facility]  Ailyn Knight M.D.,  
70016  272.0



       FACP    









 790.21

 

 733.90









 Office Visit  2010  9:30a  DO Not Use [facility]  Ailynhaily Knight,  62712
  724.2



       M.D., FACP    









 733.90

 

 272.0

 

 401.1

 

 v04.81









 Office Visit  2010  9:45a  DO Not Use [facility]  Ailyn Antonia,  26144
  272.4



       M.D., FACP    









 719.45









 Office Visit  2010  9:00a  DO Not Use [facility]  Ailyn Antonia,  12996
  272.4



       M.D., FACP    









 401.1









 Office Visit  2010 10:45a  DO Not Use [facility]  Ailynhaily Knight,  01029
  785.1



       M.D., FACP    









 272.4

 

 300.00

 

 311

 

 V04.81









 Office Visit  07/15/2009 11:45a  DO Not Use [facility]  Ailyn Antonia,  66761
  785.0



       M.D., FACP    









 785.1

 

 719.47









 Office Visit  2008  1:45p  DO Not Use [facility]  Ailyn Antonia,  13117
  272.4



       M.D., FACP    

 

 Office Visit  2008 11:15a  DO Not Use [facility]  Ailyn Antonia,  60785
  V72.31



       M.D., FACP    









 272.0

 

 401.1









 Office Visit  2007  1:30p  DO Not Use [facility]  Ailyn Antonia,  10174
  462



       M.D., FACP    

 

 Office Visit  2007 12:00p  DO Not Use [facility]  Ailyn Antonia,  64632
  372.30



       M.D., FACP    









 465.9









 Office Visit  2007  2:15p  DO Not Use [facility]  Ailyn Antonia,  34568
  289.3



       M.D., FACP    

 

 Office Visit  2006  2:30p  DO Not Use [facility]  Ailyn Antonia,  94967
  401.1



       M.D., FACP    









 V04.81









 Office Visit  10/03/2006  9:45a  DO Not Use [facility]  Ailyn Antonia,  94801
  272.0



       M.D., FACP    









 300.00







Plan of Care

Future Appointment(s):10/04/2018  7:40 am - Suzy Burnett MD at [facility] 
Qzclhoukplv98/2018 - Jorge Alberto Willett M.D.E83.42 PekfjjnmaboabwH69.6 
OoeuahsuhkhR43.00 Pure hypercholesterolemia, opyoztxlkxnT46.0 Paroxysmal atrial 
fibrillationFollow up:ov 10 m

## 2018-10-27 NOTE — UC
Complaint Female HPI





- HPI Summary


HPI Summary: 





77yo patient with history of Afib and follicular lymphoma treated with ARCHOP 

who states has been having burning urination for the past week but was 

traveling and it worsened with dehydration. She denies fever, chills, flank pain

, nausea or vomiting. She is allergic to sulfas and PCN.  Yesterday urine 

culture shows E. coli, sensitivity pending.





- History Of Current Complaint


Chief Complaint: UCGU


Stated Complaint: BURNING URINATION


Time Seen by Provider: 10/27/18 14:48


Hx Obtained From: Patient


Hx Last Menstrual Period: 


Pregnant?: No


Onset/Duration: Sudden Onset, Lasting Days


Timing: Intermittent


Severity Initially: Moderate


Severity Currently: Moderate


Pain Intensity: 3


Character: Burning


Aggravating Factor(s): Urination


Associated Signs And Symptoms: Positive: Negative





- Risk Factors


Ectopic Pregnancy Risk Factor: Negative


Ovarian Torsion Risk Factor: Negative





- Allergies/Home Medications


Allergies/Adverse Reactions: 


 Allergies











Allergy/AdvReac Type Severity Reaction Status Date / Time


 


amoxicillin Allergy  Difficulty Verified 10/27/18 14:50





   Swallowing  


 


diphenhydramine Allergy  Agitation Verified 10/27/18 14:50





[From Benadryl]     


 


iohexol [From Omnipaque] Allergy  Rash Verified 10/27/18 14:50


 


latex Allergy  Rash Verified 10/27/18 14:50


 


Sulfa (Sulfonamide Allergy  Unknown Verified 10/27/18 14:50





Antibiotics)   Reaction  





   Details  


 


"some chemo drugs" Allergy  Unknown Uncoded 10/27/18 14:50





   Reaction  





   Details  














PMH/Surg Hx/FS Hx/Imm Hx





- Additional Past Medical History


Additional PMH: 





follicular lymphoma treated with ARCHOP


Cardiovascular History: Atrial Fibrillation





- Surgical History


Surgical History: Yes


Surgery Procedure, Year, and Place: appy as "a teenager"





- Family History


Known Family History: 


   Negative: Cardiac Disease - denies family hx of, Diabetes





- Social History


Alcohol Use: Rare


Substance Use Type: Marijuana


Substance Use Comment - Amount & Last Used: medical marijuana


Smoking Status (MU): Never Smoked Tobacco





- Immunization History


Most Recent Influenza Vaccination: 2016


Most Recent Pneumonia Vaccination: up to date





Review of Systems


Constitutional: Fatigue


Genitourinary: Dysuria, Frequency


All Other Systems Reviewed And Are Negative: Yes





Physical Exam


Triage Information Reviewed: Yes


Appearance: Well-Appearing, No Pain Distress, Well-Nourished


Vital Signs: 


 Initial Vital Signs











Temp  98.2 F   10/27/18 14:39


 


Pulse  87   10/27/18 14:39


 


Resp  16   10/27/18 14:39


 


BP  110/71   10/27/18 14:39


 


Pulse Ox  99   10/27/18 14:39











Vital Signs Reviewed: Yes


Eyes: Positive: Conjunctiva Clear


ENT: Positive: Hearing grossly normal, Pharynx normal


Neck: Positive: Supple, Nontender, No Lymphadenopathy


Respiratory: Positive: Chest non-tender, Lungs clear, Normal breath sounds, No 

respiratory distress


Cardiovascular: Positive: RRR, No Murmur, Pulses Normal, Brisk Capillary Refill


Abdomen Description: Positive: Nontender, No Organomegaly, Soft


Bowel Sounds: Positive: Present


Musculoskeletal Exam: Normal





 Complaint Female Dx





- Course


Course Of Treatment: patient with UA and urine culture positive for UTI, 

consistent wth HPI, start macrobid as prescribed, fluids, f/u with PCP in a 

week.





- Differential Dx/Diagnosis


Provider Diagnoses: UTI





Discharge





- Sign-Out/Discharge


Documenting (check all that apply): Patient Departure


All imaging exams completed and their final reports reviewed: No Studies





- Discharge Plan


Condition: Stable


Disposition: HOME


Prescriptions: 


Nitrofurantoin Monohyd/M-Cryst [Macrobid 100 mg Capsule] 100 mg PO BID 7 Days #

14 cap


Patient Education Materials:  Urinary Tract Infection in Women (ED), 

Nitrofurantoin Macrocrystals (By mouth)


Referrals: 


Prachi Hansen MD [Primary Care Provider] - 





- Billing Disposition and Condition


Condition: STABLE


Disposition: Home